# Patient Record
Sex: MALE | Race: WHITE | NOT HISPANIC OR LATINO | Employment: UNEMPLOYED | URBAN - METROPOLITAN AREA
[De-identification: names, ages, dates, MRNs, and addresses within clinical notes are randomized per-mention and may not be internally consistent; named-entity substitution may affect disease eponyms.]

---

## 2021-11-29 ENCOUNTER — HOSPITAL ENCOUNTER (EMERGENCY)
Facility: HOSPITAL | Age: 36
End: 2021-11-30
Attending: EMERGENCY MEDICINE | Admitting: STUDENT IN AN ORGANIZED HEALTH CARE EDUCATION/TRAINING PROGRAM
Payer: MEDICARE

## 2021-11-29 DIAGNOSIS — F22 DELUSIONS (HCC): Primary | ICD-10-CM

## 2021-11-29 DIAGNOSIS — R46.2 BIZARRE BEHAVIOR: ICD-10-CM

## 2021-11-29 DIAGNOSIS — Z00.8 MEDICAL CLEARANCE FOR PSYCHIATRIC ADMISSION: ICD-10-CM

## 2021-11-29 DIAGNOSIS — Z72.0 NICOTINE ABUSE: ICD-10-CM

## 2021-11-29 LAB
ALBUMIN SERPL BCP-MCNC: 3 G/DL (ref 3.5–5)
ALP SERPL-CCNC: 61 U/L (ref 46–116)
ALT SERPL W P-5'-P-CCNC: 56 U/L (ref 12–78)
ANION GAP SERPL CALCULATED.3IONS-SCNC: 9 MMOL/L (ref 4–13)
AST SERPL W P-5'-P-CCNC: 43 U/L (ref 5–45)
BASOPHILS # BLD AUTO: 0.06 THOUSANDS/ΜL (ref 0–0.1)
BASOPHILS NFR BLD AUTO: 1 % (ref 0–1)
BILIRUB SERPL-MCNC: 0.3 MG/DL (ref 0.2–1)
BUN SERPL-MCNC: 18 MG/DL (ref 5–25)
CALCIUM ALBUM COR SERPL-MCNC: 9.6 MG/DL (ref 8.3–10.1)
CALCIUM SERPL-MCNC: 8.8 MG/DL (ref 8.3–10.1)
CHLORIDE SERPL-SCNC: 108 MMOL/L (ref 100–108)
CO2 SERPL-SCNC: 28 MMOL/L (ref 21–32)
CREAT SERPL-MCNC: 0.99 MG/DL (ref 0.6–1.3)
EOSINOPHIL # BLD AUTO: 0.26 THOUSAND/ΜL (ref 0–0.61)
EOSINOPHIL NFR BLD AUTO: 4 % (ref 0–6)
ERYTHROCYTE [DISTWIDTH] IN BLOOD BY AUTOMATED COUNT: 12.7 % (ref 11.6–15.1)
GFR SERPL CREATININE-BSD FRML MDRD: 97 ML/MIN/1.73SQ M
GLUCOSE SERPL-MCNC: 129 MG/DL (ref 65–140)
HCT VFR BLD AUTO: 43.1 % (ref 36.5–49.3)
HGB BLD-MCNC: 14 G/DL (ref 12–17)
IMM GRANULOCYTES # BLD AUTO: 0.01 THOUSAND/UL (ref 0–0.2)
IMM GRANULOCYTES NFR BLD AUTO: 0 % (ref 0–2)
LYMPHOCYTES # BLD AUTO: 3.04 THOUSANDS/ΜL (ref 0.6–4.47)
LYMPHOCYTES NFR BLD AUTO: 41 % (ref 14–44)
MCH RBC QN AUTO: 29.9 PG (ref 26.8–34.3)
MCHC RBC AUTO-ENTMCNC: 32.5 G/DL (ref 31.4–37.4)
MCV RBC AUTO: 92 FL (ref 82–98)
MONOCYTES # BLD AUTO: 0.69 THOUSAND/ΜL (ref 0.17–1.22)
MONOCYTES NFR BLD AUTO: 9 % (ref 4–12)
NEUTROPHILS # BLD AUTO: 3.41 THOUSANDS/ΜL (ref 1.85–7.62)
NEUTS SEG NFR BLD AUTO: 45 % (ref 43–75)
NRBC BLD AUTO-RTO: 0 /100 WBCS
PLATELET # BLD AUTO: 316 THOUSANDS/UL (ref 149–390)
PMV BLD AUTO: 10.1 FL (ref 8.9–12.7)
POTASSIUM SERPL-SCNC: 3.8 MMOL/L (ref 3.5–5.3)
PROT SERPL-MCNC: 6.4 G/DL (ref 6.4–8.2)
RBC # BLD AUTO: 4.68 MILLION/UL (ref 3.88–5.62)
SODIUM SERPL-SCNC: 145 MMOL/L (ref 136–145)
WBC # BLD AUTO: 7.47 THOUSAND/UL (ref 4.31–10.16)

## 2021-11-29 PROCEDURE — 85025 COMPLETE CBC W/AUTO DIFF WBC: CPT | Performed by: EMERGENCY MEDICINE

## 2021-11-29 PROCEDURE — 99284 EMERGENCY DEPT VISIT MOD MDM: CPT | Performed by: EMERGENCY MEDICINE

## 2021-11-29 PROCEDURE — 0241U HB NFCT DS VIR RESP RNA 4 TRGT: CPT | Performed by: EMERGENCY MEDICINE

## 2021-11-29 PROCEDURE — 36415 COLL VENOUS BLD VENIPUNCTURE: CPT | Performed by: EMERGENCY MEDICINE

## 2021-11-29 PROCEDURE — 82075 ASSAY OF BREATH ETHANOL: CPT | Performed by: EMERGENCY MEDICINE

## 2021-11-29 PROCEDURE — 96372 THER/PROPH/DIAG INJ SC/IM: CPT

## 2021-11-29 PROCEDURE — 80053 COMPREHEN METABOLIC PANEL: CPT | Performed by: EMERGENCY MEDICINE

## 2021-11-29 PROCEDURE — 99285 EMERGENCY DEPT VISIT HI MDM: CPT

## 2021-11-29 RX ORDER — HALOPERIDOL 5 MG/ML
5 INJECTION INTRAMUSCULAR ONCE
Status: COMPLETED | OUTPATIENT
Start: 2021-11-29 | End: 2021-11-29

## 2021-11-29 RX ORDER — NICOTINE 21 MG/24HR
14 PATCH, TRANSDERMAL 24 HOURS TRANSDERMAL ONCE
Status: DISCONTINUED | OUTPATIENT
Start: 2021-11-29 | End: 2021-11-30 | Stop reason: HOSPADM

## 2021-11-29 RX ORDER — LORAZEPAM 2 MG/ML
2 INJECTION INTRAMUSCULAR ONCE
Status: COMPLETED | OUTPATIENT
Start: 2021-11-29 | End: 2021-11-29

## 2021-11-29 RX ORDER — BENZTROPINE MESYLATE 1 MG/ML
1 INJECTION INTRAMUSCULAR; INTRAVENOUS ONCE
Status: COMPLETED | OUTPATIENT
Start: 2021-11-29 | End: 2021-11-29

## 2021-11-29 RX ADMIN — NICOTINE 14 MG: 14 PATCH, EXTENDED RELEASE TRANSDERMAL at 23:16

## 2021-11-29 RX ADMIN — HALOPERIDOL LACTATE 5 MG: 5 INJECTION, SOLUTION INTRAMUSCULAR at 15:40

## 2021-11-29 RX ADMIN — BENZTROPINE MESYLATE 1 MG: 1 INJECTION INTRAMUSCULAR; INTRAVENOUS at 15:40

## 2021-11-29 RX ADMIN — LORAZEPAM 2 MG: 2 INJECTION INTRAMUSCULAR; INTRAVENOUS at 15:41

## 2021-11-30 ENCOUNTER — HOSPITAL ENCOUNTER (INPATIENT)
Facility: HOSPITAL | Age: 36
LOS: 15 days | Discharge: HOME | DRG: 885 | End: 2021-12-15
Attending: STUDENT IN AN ORGANIZED HEALTH CARE EDUCATION/TRAINING PROGRAM | Admitting: STUDENT IN AN ORGANIZED HEALTH CARE EDUCATION/TRAINING PROGRAM
Payer: MEDICARE

## 2021-11-30 VITALS
OXYGEN SATURATION: 100 % | SYSTOLIC BLOOD PRESSURE: 132 MMHG | WEIGHT: 170 LBS | RESPIRATION RATE: 16 BRPM | HEART RATE: 90 BPM | HEIGHT: 71 IN | TEMPERATURE: 97.7 F | BODY MASS INDEX: 23.8 KG/M2 | DIASTOLIC BLOOD PRESSURE: 70 MMHG

## 2021-11-30 DIAGNOSIS — Z00.8 MEDICAL CLEARANCE FOR PSYCHIATRIC ADMISSION: ICD-10-CM

## 2021-11-30 DIAGNOSIS — F31.10 BIPOLAR I DISORDER WITH MANIA (HCC): ICD-10-CM

## 2021-11-30 DIAGNOSIS — T14.8XXA AVULSION FRACTURE: Primary | ICD-10-CM

## 2021-11-30 PROBLEM — F31.9 BIPOLAR 1 DISORDER (HCC): Status: ACTIVE | Noted: 2021-11-30

## 2021-11-30 PROBLEM — Z72.0 TOBACCO ABUSE: Status: ACTIVE | Noted: 2021-11-30

## 2021-11-30 LAB
AMPHETAMINES SERPL QL SCN: NEGATIVE
BARBITURATES UR QL: NEGATIVE
BENZODIAZ UR QL: NEGATIVE
COCAINE UR QL: NEGATIVE
ETHANOL EXG-MCNC: 0 MG/DL
FLUAV RNA RESP QL NAA+PROBE: NEGATIVE
FLUBV RNA RESP QL NAA+PROBE: NEGATIVE
METHADONE UR QL: NEGATIVE
OPIATES UR QL SCN: NEGATIVE
OXYCODONE+OXYMORPHONE UR QL SCN: NEGATIVE
PCP UR QL: NEGATIVE
RSV RNA RESP QL NAA+PROBE: NEGATIVE
SARS-COV-2 RNA RESP QL NAA+PROBE: NEGATIVE
THC UR QL: POSITIVE

## 2021-11-30 PROCEDURE — 80307 DRUG TEST PRSMV CHEM ANLYZR: CPT | Performed by: EMERGENCY MEDICINE

## 2021-11-30 PROCEDURE — 99221 1ST HOSP IP/OBS SF/LOW 40: CPT | Performed by: INTERNAL MEDICINE

## 2021-11-30 RX ORDER — LANOLIN ALCOHOL/MO/W.PET/CERES
3 CREAM (GRAM) TOPICAL
Status: DISCONTINUED | OUTPATIENT
Start: 2021-11-30 | End: 2021-12-03

## 2021-11-30 RX ORDER — AMOXICILLIN 250 MG
1 CAPSULE ORAL DAILY PRN
Status: DISCONTINUED | OUTPATIENT
Start: 2021-11-30 | End: 2021-12-15 | Stop reason: HOSPADM

## 2021-11-30 RX ORDER — LORAZEPAM 2 MG/ML
2 INJECTION INTRAMUSCULAR EVERY 6 HOURS PRN
Status: DISCONTINUED | OUTPATIENT
Start: 2021-11-30 | End: 2021-12-15 | Stop reason: HOSPADM

## 2021-11-30 RX ORDER — OLANZAPINE 2.5 MG/1
5 TABLET ORAL
Status: CANCELLED | OUTPATIENT
Start: 2021-11-30

## 2021-11-30 RX ORDER — POLYETHYLENE GLYCOL 3350 17 G/17G
17 POWDER, FOR SOLUTION ORAL DAILY PRN
Status: DISCONTINUED | OUTPATIENT
Start: 2021-11-30 | End: 2021-12-15 | Stop reason: HOSPADM

## 2021-11-30 RX ORDER — MAGNESIUM HYDROXIDE/ALUMINUM HYDROXICE/SIMETHICONE 120; 1200; 1200 MG/30ML; MG/30ML; MG/30ML
30 SUSPENSION ORAL EVERY 4 HOURS PRN
Status: CANCELLED | OUTPATIENT
Start: 2021-11-30

## 2021-11-30 RX ORDER — ACETAMINOPHEN 325 MG/1
975 TABLET ORAL EVERY 6 HOURS PRN
Status: DISCONTINUED | OUTPATIENT
Start: 2021-11-30 | End: 2021-12-15 | Stop reason: HOSPADM

## 2021-11-30 RX ORDER — HYDROXYZINE HYDROCHLORIDE 25 MG/1
100 TABLET, FILM COATED ORAL
Status: CANCELLED | OUTPATIENT
Start: 2021-11-30

## 2021-11-30 RX ORDER — POLYETHYLENE GLYCOL 3350 17 G/17G
17 POWDER, FOR SOLUTION ORAL DAILY PRN
Status: CANCELLED | OUTPATIENT
Start: 2021-11-30

## 2021-11-30 RX ORDER — HYDROXYZINE 50 MG/1
50 TABLET, FILM COATED ORAL
Status: DISCONTINUED | OUTPATIENT
Start: 2021-11-30 | End: 2021-12-15 | Stop reason: HOSPADM

## 2021-11-30 RX ORDER — ACETAMINOPHEN 325 MG/1
650 TABLET ORAL EVERY 4 HOURS PRN
Status: DISCONTINUED | OUTPATIENT
Start: 2021-11-30 | End: 2021-12-15 | Stop reason: HOSPADM

## 2021-11-30 RX ORDER — ACETAMINOPHEN 325 MG/1
975 TABLET ORAL EVERY 6 HOURS PRN
Status: CANCELLED | OUTPATIENT
Start: 2021-11-30

## 2021-11-30 RX ORDER — POLYETHYLENE GLYCOL 3350 17 G/17G
17 POWDER, FOR SOLUTION ORAL DAILY PRN
Status: DISCONTINUED | OUTPATIENT
Start: 2021-11-30 | End: 2021-11-30

## 2021-11-30 RX ORDER — OLANZAPINE 2.5 MG/1
2.5 TABLET ORAL
Status: DISCONTINUED | OUTPATIENT
Start: 2021-11-30 | End: 2021-12-15 | Stop reason: HOSPADM

## 2021-11-30 RX ORDER — ACETAMINOPHEN 325 MG/1
650 TABLET ORAL EVERY 4 HOURS PRN
Status: CANCELLED | OUTPATIENT
Start: 2021-11-30

## 2021-11-30 RX ORDER — MAGNESIUM HYDROXIDE/ALUMINUM HYDROXICE/SIMETHICONE 120; 1200; 1200 MG/30ML; MG/30ML; MG/30ML
30 SUSPENSION ORAL EVERY 4 HOURS PRN
Status: DISCONTINUED | OUTPATIENT
Start: 2021-11-30 | End: 2021-12-15 | Stop reason: HOSPADM

## 2021-11-30 RX ORDER — HYDROXYZINE HYDROCHLORIDE 25 MG/1
50 TABLET, FILM COATED ORAL
Status: CANCELLED | OUTPATIENT
Start: 2021-11-30

## 2021-11-30 RX ORDER — HYDROXYZINE HYDROCHLORIDE 25 MG/1
25 TABLET, FILM COATED ORAL
Status: DISCONTINUED | OUTPATIENT
Start: 2021-11-30 | End: 2021-12-15 | Stop reason: HOSPADM

## 2021-11-30 RX ORDER — OLANZAPINE 10 MG/1
10 TABLET ORAL
Status: DISCONTINUED | OUTPATIENT
Start: 2021-11-30 | End: 2021-12-15 | Stop reason: HOSPADM

## 2021-11-30 RX ORDER — OLANZAPINE 5 MG/1
5 TABLET ORAL
Status: DISCONTINUED | OUTPATIENT
Start: 2021-11-30 | End: 2021-12-15 | Stop reason: HOSPADM

## 2021-11-30 RX ORDER — OLANZAPINE 2.5 MG/1
10 TABLET ORAL
Status: CANCELLED | OUTPATIENT
Start: 2021-11-30

## 2021-11-30 RX ORDER — OLANZAPINE 10 MG/1
10 INJECTION, POWDER, LYOPHILIZED, FOR SOLUTION INTRAMUSCULAR
Status: DISCONTINUED | OUTPATIENT
Start: 2021-11-30 | End: 2021-12-15 | Stop reason: HOSPADM

## 2021-11-30 RX ORDER — ACETAMINOPHEN 325 MG/1
650 TABLET ORAL EVERY 6 HOURS PRN
Status: DISCONTINUED | OUTPATIENT
Start: 2021-11-30 | End: 2021-12-15 | Stop reason: HOSPADM

## 2021-11-30 RX ORDER — OLANZAPINE 10 MG/1
5 INJECTION, POWDER, LYOPHILIZED, FOR SOLUTION INTRAMUSCULAR
Status: DISCONTINUED | OUTPATIENT
Start: 2021-11-30 | End: 2021-12-15 | Stop reason: HOSPADM

## 2021-11-30 RX ORDER — BENZTROPINE MESYLATE 1 MG/ML
1 INJECTION INTRAMUSCULAR; INTRAVENOUS
Status: CANCELLED | OUTPATIENT
Start: 2021-11-30

## 2021-11-30 RX ORDER — BENZTROPINE MESYLATE 0.5 MG/1
1 TABLET ORAL
Status: CANCELLED | OUTPATIENT
Start: 2021-11-30

## 2021-11-30 RX ORDER — HYDROXYZINE 50 MG/1
100 TABLET, FILM COATED ORAL
Status: DISCONTINUED | OUTPATIENT
Start: 2021-11-30 | End: 2021-12-15 | Stop reason: HOSPADM

## 2021-11-30 RX ORDER — OLANZAPINE 2.5 MG/1
2.5 TABLET ORAL
Status: CANCELLED | OUTPATIENT
Start: 2021-11-30

## 2021-11-30 RX ORDER — DIPHENHYDRAMINE HYDROCHLORIDE 50 MG/ML
50 INJECTION INTRAMUSCULAR; INTRAVENOUS EVERY 6 HOURS PRN
Status: DISCONTINUED | OUTPATIENT
Start: 2021-11-30 | End: 2021-12-15 | Stop reason: HOSPADM

## 2021-11-30 RX ORDER — BENZTROPINE MESYLATE 1 MG/1
1 TABLET ORAL
Status: DISCONTINUED | OUTPATIENT
Start: 2021-11-30 | End: 2021-12-15 | Stop reason: HOSPADM

## 2021-11-30 RX ORDER — DIPHENHYDRAMINE HYDROCHLORIDE 50 MG/ML
50 INJECTION INTRAMUSCULAR; INTRAVENOUS EVERY 6 HOURS PRN
Status: CANCELLED | OUTPATIENT
Start: 2021-11-30

## 2021-11-30 RX ORDER — LORAZEPAM 2 MG/ML
2 INJECTION INTRAMUSCULAR EVERY 6 HOURS PRN
Status: CANCELLED | OUTPATIENT
Start: 2021-11-30

## 2021-11-30 RX ORDER — OLANZAPINE 10 MG/1
10 INJECTION, POWDER, LYOPHILIZED, FOR SOLUTION INTRAMUSCULAR
Status: CANCELLED | OUTPATIENT
Start: 2021-11-30

## 2021-11-30 RX ORDER — MINERAL OIL AND PETROLATUM 150; 830 MG/G; MG/G
1 OINTMENT OPHTHALMIC
Status: DISCONTINUED | OUTPATIENT
Start: 2021-11-30 | End: 2021-12-15 | Stop reason: HOSPADM

## 2021-11-30 RX ORDER — HYDROXYZINE HYDROCHLORIDE 25 MG/1
25 TABLET, FILM COATED ORAL
Status: CANCELLED | OUTPATIENT
Start: 2021-11-30

## 2021-11-30 RX ORDER — OLANZAPINE 10 MG/1
5 INJECTION, POWDER, LYOPHILIZED, FOR SOLUTION INTRAMUSCULAR
Status: CANCELLED | OUTPATIENT
Start: 2021-11-30

## 2021-11-30 RX ORDER — LANOLIN ALCOHOL/MO/W.PET/CERES
3 CREAM (GRAM) TOPICAL
Status: CANCELLED | OUTPATIENT
Start: 2021-11-30

## 2021-11-30 RX ORDER — MINERAL OIL AND PETROLATUM 150; 830 MG/G; MG/G
1 OINTMENT OPHTHALMIC
Status: CANCELLED | OUTPATIENT
Start: 2021-11-30

## 2021-11-30 RX ORDER — BENZTROPINE MESYLATE 1 MG/ML
1 INJECTION INTRAMUSCULAR; INTRAVENOUS
Status: DISCONTINUED | OUTPATIENT
Start: 2021-11-30 | End: 2021-12-15 | Stop reason: HOSPADM

## 2021-11-30 RX ORDER — AMOXICILLIN 250 MG
1 CAPSULE ORAL DAILY PRN
Status: CANCELLED | OUTPATIENT
Start: 2021-11-30

## 2021-11-30 RX ORDER — ACETAMINOPHEN 325 MG/1
650 TABLET ORAL EVERY 6 HOURS PRN
Status: CANCELLED | OUTPATIENT
Start: 2021-11-30

## 2021-12-01 PROBLEM — F31.10 BIPOLAR I DISORDER WITH MANIA (HCC): Status: ACTIVE | Noted: 2021-11-30

## 2021-12-01 PROCEDURE — 99221 1ST HOSP IP/OBS SF/LOW 40: CPT | Performed by: STUDENT IN AN ORGANIZED HEALTH CARE EDUCATION/TRAINING PROGRAM

## 2021-12-01 RX ORDER — OLANZAPINE 10 MG/1
10 TABLET, ORALLY DISINTEGRATING ORAL
Status: DISCONTINUED | OUTPATIENT
Start: 2021-12-01 | End: 2021-12-02

## 2021-12-01 RX ORDER — DIVALPROEX SODIUM 500 MG/1
500 TABLET, EXTENDED RELEASE ORAL
Status: DISCONTINUED | OUTPATIENT
Start: 2021-12-01 | End: 2021-12-03

## 2021-12-01 RX ADMIN — NICOTINE POLACRILEX 2 MG: 2 GUM, CHEWING ORAL at 18:23

## 2021-12-01 RX ADMIN — NICOTINE POLACRILEX 2 MG: 2 GUM, CHEWING ORAL at 07:28

## 2021-12-01 RX ADMIN — NICOTINE POLACRILEX 2 MG: 2 GUM, CHEWING ORAL at 10:54

## 2021-12-01 RX ADMIN — NICOTINE POLACRILEX 2 MG: 2 GUM, CHEWING ORAL at 13:40

## 2021-12-02 PROCEDURE — 99232 SBSQ HOSP IP/OBS MODERATE 35: CPT | Performed by: STUDENT IN AN ORGANIZED HEALTH CARE EDUCATION/TRAINING PROGRAM

## 2021-12-02 RX ORDER — OLANZAPINE 10 MG/1
10 TABLET, ORALLY DISINTEGRATING ORAL
Status: DISCONTINUED | OUTPATIENT
Start: 2021-12-02 | End: 2021-12-06

## 2021-12-02 RX ORDER — OLANZAPINE 10 MG/1
10 INJECTION, POWDER, LYOPHILIZED, FOR SOLUTION INTRAMUSCULAR
Status: DISCONTINUED | OUTPATIENT
Start: 2021-12-02 | End: 2021-12-06

## 2021-12-02 RX ORDER — LORAZEPAM 0.5 MG/1
0.5 TABLET ORAL 2 TIMES DAILY
Status: DISCONTINUED | OUTPATIENT
Start: 2021-12-02 | End: 2021-12-10

## 2021-12-02 RX ADMIN — NICOTINE POLACRILEX 2 MG: 2 GUM, CHEWING ORAL at 04:05

## 2021-12-02 RX ADMIN — LORAZEPAM 0.5 MG: 0.5 TABLET ORAL at 23:48

## 2021-12-02 RX ADMIN — DIVALPROEX SODIUM 500 MG: 500 TABLET, EXTENDED RELEASE ORAL at 23:49

## 2021-12-02 RX ADMIN — NICOTINE POLACRILEX 2 MG: 2 GUM, CHEWING ORAL at 08:30

## 2021-12-02 RX ADMIN — ACETAMINOPHEN 650 MG: 325 TABLET ORAL at 16:06

## 2021-12-02 RX ADMIN — NICOTINE POLACRILEX 2 MG: 2 GUM, CHEWING ORAL at 14:33

## 2021-12-02 RX ADMIN — OLANZAPINE 10 MG: 10 INJECTION, POWDER, FOR SOLUTION INTRAMUSCULAR at 21:59

## 2021-12-02 RX ADMIN — MELATONIN TAB 3 MG 3 MG: 3 TAB at 23:49

## 2021-12-03 LAB
25(OH)D3 SERPL-MCNC: 36.5 NG/ML (ref 30–100)
VIT B12 SERPL-MCNC: 432 PG/ML (ref 100–900)

## 2021-12-03 PROCEDURE — 99231 SBSQ HOSP IP/OBS SF/LOW 25: CPT | Performed by: STUDENT IN AN ORGANIZED HEALTH CARE EDUCATION/TRAINING PROGRAM

## 2021-12-03 PROCEDURE — 82607 VITAMIN B-12: CPT | Performed by: STUDENT IN AN ORGANIZED HEALTH CARE EDUCATION/TRAINING PROGRAM

## 2021-12-03 PROCEDURE — 82306 VITAMIN D 25 HYDROXY: CPT | Performed by: STUDENT IN AN ORGANIZED HEALTH CARE EDUCATION/TRAINING PROGRAM

## 2021-12-03 RX ORDER — DIVALPROEX SODIUM 500 MG/1
1000 TABLET, EXTENDED RELEASE ORAL
Status: DISCONTINUED | OUTPATIENT
Start: 2021-12-03 | End: 2021-12-08

## 2021-12-03 RX ORDER — LANOLIN ALCOHOL/MO/W.PET/CERES
3 CREAM (GRAM) TOPICAL
Status: DISCONTINUED | OUTPATIENT
Start: 2021-12-03 | End: 2021-12-15 | Stop reason: HOSPADM

## 2021-12-03 RX ADMIN — LORAZEPAM 0.5 MG: 0.5 TABLET ORAL at 08:30

## 2021-12-03 RX ADMIN — LORAZEPAM 0.5 MG: 0.5 TABLET ORAL at 17:39

## 2021-12-03 RX ADMIN — OLANZAPINE 10 MG: 10 TABLET, ORALLY DISINTEGRATING ORAL at 21:22

## 2021-12-03 RX ADMIN — DIVALPROEX SODIUM 1000 MG: 500 TABLET, EXTENDED RELEASE ORAL at 21:21

## 2021-12-03 RX ADMIN — ACETAMINOPHEN 650 MG: 325 TABLET ORAL at 16:39

## 2021-12-03 RX ADMIN — ACETAMINOPHEN 650 MG: 325 TABLET ORAL at 07:15

## 2021-12-03 RX ADMIN — NICOTINE POLACRILEX 2 MG: 2 GUM, CHEWING ORAL at 14:20

## 2021-12-04 PROCEDURE — 99232 SBSQ HOSP IP/OBS MODERATE 35: CPT | Performed by: PSYCHIATRY & NEUROLOGY

## 2021-12-04 RX ADMIN — NICOTINE POLACRILEX 2 MG: 2 GUM, CHEWING ORAL at 02:43

## 2021-12-04 RX ADMIN — ACETAMINOPHEN 650 MG: 325 TABLET ORAL at 15:10

## 2021-12-04 RX ADMIN — LORAZEPAM 0.5 MG: 0.5 TABLET ORAL at 08:03

## 2021-12-04 RX ADMIN — NICOTINE POLACRILEX 2 MG: 2 GUM, CHEWING ORAL at 11:06

## 2021-12-04 RX ADMIN — DIVALPROEX SODIUM 1000 MG: 500 TABLET, EXTENDED RELEASE ORAL at 21:20

## 2021-12-04 RX ADMIN — OLANZAPINE 10 MG: 10 TABLET, ORALLY DISINTEGRATING ORAL at 21:21

## 2021-12-04 RX ADMIN — ACETAMINOPHEN 650 MG: 325 TABLET ORAL at 07:41

## 2021-12-04 RX ADMIN — NICOTINE POLACRILEX 2 MG: 2 GUM, CHEWING ORAL at 06:55

## 2021-12-04 RX ADMIN — LORAZEPAM 0.5 MG: 0.5 TABLET ORAL at 17:13

## 2021-12-05 PROCEDURE — 99232 SBSQ HOSP IP/OBS MODERATE 35: CPT | Performed by: PSYCHIATRY & NEUROLOGY

## 2021-12-05 RX ADMIN — DIVALPROEX SODIUM 1000 MG: 500 TABLET, EXTENDED RELEASE ORAL at 21:13

## 2021-12-05 RX ADMIN — NICOTINE POLACRILEX 2 MG: 2 GUM, CHEWING ORAL at 14:50

## 2021-12-05 RX ADMIN — LORAZEPAM 0.5 MG: 0.5 TABLET ORAL at 08:07

## 2021-12-05 RX ADMIN — ACETAMINOPHEN 650 MG: 325 TABLET ORAL at 11:59

## 2021-12-05 RX ADMIN — NICOTINE POLACRILEX 2 MG: 2 GUM, CHEWING ORAL at 06:37

## 2021-12-05 RX ADMIN — ACETAMINOPHEN 975 MG: 325 TABLET ORAL at 21:13

## 2021-12-05 RX ADMIN — NICOTINE POLACRILEX 2 MG: 2 GUM, CHEWING ORAL at 18:40

## 2021-12-05 RX ADMIN — LORAZEPAM 0.5 MG: 0.5 TABLET ORAL at 17:29

## 2021-12-05 RX ADMIN — OLANZAPINE 10 MG: 10 TABLET, ORALLY DISINTEGRATING ORAL at 21:13

## 2021-12-05 RX ADMIN — NICOTINE POLACRILEX 2 MG: 2 GUM, CHEWING ORAL at 11:59

## 2021-12-06 PROCEDURE — 99231 SBSQ HOSP IP/OBS SF/LOW 25: CPT | Performed by: STUDENT IN AN ORGANIZED HEALTH CARE EDUCATION/TRAINING PROGRAM

## 2021-12-06 RX ORDER — OLANZAPINE 10 MG/1
10 INJECTION, POWDER, LYOPHILIZED, FOR SOLUTION INTRAMUSCULAR
Status: DISCONTINUED | OUTPATIENT
Start: 2021-12-06 | End: 2021-12-15

## 2021-12-06 RX ADMIN — OLANZAPINE 15 MG: 5 TABLET, ORALLY DISINTEGRATING ORAL at 21:32

## 2021-12-06 RX ADMIN — NICOTINE POLACRILEX 2 MG: 2 GUM, CHEWING ORAL at 05:23

## 2021-12-06 RX ADMIN — ACETAMINOPHEN 975 MG: 325 TABLET ORAL at 05:21

## 2021-12-06 RX ADMIN — LORAZEPAM 0.5 MG: 0.5 TABLET ORAL at 08:04

## 2021-12-06 RX ADMIN — NICOTINE POLACRILEX 2 MG: 2 GUM, CHEWING ORAL at 08:41

## 2021-12-06 RX ADMIN — DIVALPROEX SODIUM 1000 MG: 500 TABLET, EXTENDED RELEASE ORAL at 21:32

## 2021-12-06 RX ADMIN — LORAZEPAM 0.5 MG: 0.5 TABLET ORAL at 17:44

## 2021-12-06 RX ADMIN — NICOTINE POLACRILEX 2 MG: 2 GUM, CHEWING ORAL at 17:49

## 2021-12-07 ENCOUNTER — APPOINTMENT (INPATIENT)
Dept: RADIOLOGY | Facility: HOSPITAL | Age: 36
DRG: 885 | End: 2021-12-07
Payer: MEDICARE

## 2021-12-07 PROBLEM — M79.601 RIGHT ARM PAIN: Status: ACTIVE | Noted: 2021-12-07

## 2021-12-07 LAB
ALBUMIN SERPL BCP-MCNC: 3.7 G/DL (ref 3–5.2)
ALP SERPL-CCNC: 68 U/L (ref 43–122)
ALT SERPL W P-5'-P-CCNC: 31 U/L
ANION GAP SERPL CALCULATED.3IONS-SCNC: 2 MMOL/L (ref 5–14)
AST SERPL W P-5'-P-CCNC: 28 U/L (ref 17–59)
BASOPHILS # BLD AUTO: 0.1 THOUSANDS/ΜL (ref 0–0.1)
BASOPHILS NFR BLD AUTO: 1 % (ref 0–1)
BILIRUB SERPL-MCNC: 0.13 MG/DL
BUN SERPL-MCNC: 20 MG/DL (ref 5–25)
CALCIUM SERPL-MCNC: 8.7 MG/DL (ref 8.4–10.2)
CHLORIDE SERPL-SCNC: 106 MMOL/L (ref 97–108)
CHOLEST SERPL-MCNC: 150 MG/DL
CO2 SERPL-SCNC: 30 MMOL/L (ref 22–30)
CREAT SERPL-MCNC: 1.09 MG/DL (ref 0.7–1.5)
EOSINOPHIL # BLD AUTO: 0.2 THOUSAND/ΜL (ref 0–0.4)
EOSINOPHIL NFR BLD AUTO: 3 % (ref 0–6)
ERYTHROCYTE [DISTWIDTH] IN BLOOD BY AUTOMATED COUNT: 12.8 %
GFR SERPL CREATININE-BSD FRML MDRD: 86 ML/MIN/1.73SQ M
GLUCOSE SERPL-MCNC: 121 MG/DL (ref 70–99)
HCT VFR BLD AUTO: 41.5 % (ref 41–53)
HDLC SERPL-MCNC: 33 MG/DL
HGB BLD-MCNC: 14.1 G/DL (ref 13.5–17.5)
LDLC SERPL CALC-MCNC: 74 MG/DL
LYMPHOCYTES # BLD AUTO: 2.9 THOUSANDS/ΜL (ref 0.5–4)
LYMPHOCYTES NFR BLD AUTO: 40 % (ref 25–45)
MAGNESIUM SERPL-MCNC: 1.8 MG/DL (ref 1.6–2.3)
MCH RBC QN AUTO: 30.5 PG (ref 26–34)
MCHC RBC AUTO-ENTMCNC: 33.9 G/DL (ref 31–36)
MCV RBC AUTO: 90 FL (ref 80–100)
MONOCYTES # BLD AUTO: 0.5 THOUSAND/ΜL (ref 0.2–0.9)
MONOCYTES NFR BLD AUTO: 7 % (ref 1–10)
NEUTROPHILS # BLD AUTO: 3.5 THOUSANDS/ΜL (ref 1.8–7.8)
NEUTS SEG NFR BLD AUTO: 49 % (ref 45–65)
NONHDLC SERPL-MCNC: 117 MG/DL
PHOSPHATE SERPL-MCNC: 4.8 MG/DL (ref 2.5–4.8)
PLATELET # BLD AUTO: 321 THOUSANDS/UL (ref 150–450)
PMV BLD AUTO: 8.7 FL (ref 8.9–12.7)
POTASSIUM SERPL-SCNC: 3.8 MMOL/L (ref 3.6–5)
PROT SERPL-MCNC: 6.6 G/DL (ref 5.9–8.4)
RBC # BLD AUTO: 4.61 MILLION/UL (ref 4.5–5.9)
SODIUM SERPL-SCNC: 138 MMOL/L (ref 137–147)
TRIGL SERPL-MCNC: 215 MG/DL
TSH SERPL DL<=0.05 MIU/L-ACNC: 0.96 UIU/ML (ref 0.47–4.68)
VALPROATE SERPL-MCNC: 31 UG/ML (ref 50–120)
WBC # BLD AUTO: 7.2 THOUSAND/UL (ref 4.5–11)

## 2021-12-07 PROCEDURE — 84100 ASSAY OF PHOSPHORUS: CPT | Performed by: STUDENT IN AN ORGANIZED HEALTH CARE EDUCATION/TRAINING PROGRAM

## 2021-12-07 PROCEDURE — 80053 COMPREHEN METABOLIC PANEL: CPT | Performed by: STUDENT IN AN ORGANIZED HEALTH CARE EDUCATION/TRAINING PROGRAM

## 2021-12-07 PROCEDURE — 84443 ASSAY THYROID STIM HORMONE: CPT | Performed by: STUDENT IN AN ORGANIZED HEALTH CARE EDUCATION/TRAINING PROGRAM

## 2021-12-07 PROCEDURE — 80061 LIPID PANEL: CPT | Performed by: STUDENT IN AN ORGANIZED HEALTH CARE EDUCATION/TRAINING PROGRAM

## 2021-12-07 PROCEDURE — 85025 COMPLETE CBC W/AUTO DIFF WBC: CPT | Performed by: STUDENT IN AN ORGANIZED HEALTH CARE EDUCATION/TRAINING PROGRAM

## 2021-12-07 PROCEDURE — 80164 ASSAY DIPROPYLACETIC ACD TOT: CPT | Performed by: STUDENT IN AN ORGANIZED HEALTH CARE EDUCATION/TRAINING PROGRAM

## 2021-12-07 PROCEDURE — 99231 SBSQ HOSP IP/OBS SF/LOW 25: CPT | Performed by: STUDENT IN AN ORGANIZED HEALTH CARE EDUCATION/TRAINING PROGRAM

## 2021-12-07 PROCEDURE — 83735 ASSAY OF MAGNESIUM: CPT | Performed by: STUDENT IN AN ORGANIZED HEALTH CARE EDUCATION/TRAINING PROGRAM

## 2021-12-07 PROCEDURE — 99231 SBSQ HOSP IP/OBS SF/LOW 25: CPT

## 2021-12-07 PROCEDURE — 86592 SYPHILIS TEST NON-TREP QUAL: CPT | Performed by: STUDENT IN AN ORGANIZED HEALTH CARE EDUCATION/TRAINING PROGRAM

## 2021-12-07 PROCEDURE — 83036 HEMOGLOBIN GLYCOSYLATED A1C: CPT | Performed by: STUDENT IN AN ORGANIZED HEALTH CARE EDUCATION/TRAINING PROGRAM

## 2021-12-07 PROCEDURE — 73080 X-RAY EXAM OF ELBOW: CPT

## 2021-12-07 RX ADMIN — NICOTINE POLACRILEX 2 MG: 2 GUM, CHEWING ORAL at 11:26

## 2021-12-07 RX ADMIN — DICLOFENAC SODIUM 2 G: 10 GEL TOPICAL at 21:14

## 2021-12-07 RX ADMIN — OLANZAPINE 15 MG: 5 TABLET, ORALLY DISINTEGRATING ORAL at 21:14

## 2021-12-07 RX ADMIN — NICOTINE POLACRILEX 2 MG: 2 GUM, CHEWING ORAL at 15:50

## 2021-12-07 RX ADMIN — NICOTINE POLACRILEX 2 MG: 2 GUM, CHEWING ORAL at 06:51

## 2021-12-07 RX ADMIN — NICOTINE POLACRILEX 2 MG: 2 GUM, CHEWING ORAL at 19:45

## 2021-12-07 RX ADMIN — DIVALPROEX SODIUM 1000 MG: 500 TABLET, EXTENDED RELEASE ORAL at 21:14

## 2021-12-07 RX ADMIN — DICLOFENAC SODIUM 2 G: 10 GEL TOPICAL at 14:39

## 2021-12-07 RX ADMIN — LORAZEPAM 0.5 MG: 0.5 TABLET ORAL at 08:42

## 2021-12-07 RX ADMIN — LORAZEPAM 0.5 MG: 0.5 TABLET ORAL at 17:37

## 2021-12-07 RX ADMIN — DICLOFENAC SODIUM 2 G: 10 GEL TOPICAL at 17:40

## 2021-12-08 LAB
EST. AVERAGE GLUCOSE BLD GHB EST-MCNC: 114 MG/DL
HBA1C MFR BLD: 5.6 %
RPR SER QL: NORMAL

## 2021-12-08 PROCEDURE — 99231 SBSQ HOSP IP/OBS SF/LOW 25: CPT | Performed by: STUDENT IN AN ORGANIZED HEALTH CARE EDUCATION/TRAINING PROGRAM

## 2021-12-08 RX ORDER — DIVALPROEX SODIUM 500 MG/1
1500 TABLET, EXTENDED RELEASE ORAL
Status: DISCONTINUED | OUTPATIENT
Start: 2021-12-08 | End: 2021-12-15 | Stop reason: HOSPADM

## 2021-12-08 RX ADMIN — DICLOFENAC SODIUM 2 G: 10 GEL TOPICAL at 21:08

## 2021-12-08 RX ADMIN — NICOTINE POLACRILEX 2 MG: 2 GUM, CHEWING ORAL at 11:15

## 2021-12-08 RX ADMIN — DICLOFENAC SODIUM 2 G: 10 GEL TOPICAL at 17:01

## 2021-12-08 RX ADMIN — LORAZEPAM 0.5 MG: 0.5 TABLET ORAL at 08:06

## 2021-12-08 RX ADMIN — OLANZAPINE 15 MG: 5 TABLET, ORALLY DISINTEGRATING ORAL at 21:08

## 2021-12-08 RX ADMIN — NICOTINE POLACRILEX 2 MG: 2 GUM, CHEWING ORAL at 05:23

## 2021-12-08 RX ADMIN — NICOTINE POLACRILEX 2 MG: 2 GUM, CHEWING ORAL at 16:57

## 2021-12-08 RX ADMIN — DICLOFENAC SODIUM 2 G: 10 GEL TOPICAL at 08:07

## 2021-12-08 RX ADMIN — NICOTINE POLACRILEX 2 MG: 2 GUM, CHEWING ORAL at 21:54

## 2021-12-08 RX ADMIN — DICLOFENAC SODIUM 2 G: 10 GEL TOPICAL at 11:17

## 2021-12-08 RX ADMIN — LORAZEPAM 0.5 MG: 0.5 TABLET ORAL at 17:01

## 2021-12-08 RX ADMIN — DIVALPROEX SODIUM 1500 MG: 500 TABLET, FILM COATED, EXTENDED RELEASE ORAL at 21:08

## 2021-12-09 PROCEDURE — 99231 SBSQ HOSP IP/OBS SF/LOW 25: CPT | Performed by: STUDENT IN AN ORGANIZED HEALTH CARE EDUCATION/TRAINING PROGRAM

## 2021-12-09 RX ADMIN — ACETAMINOPHEN 650 MG: 325 TABLET ORAL at 19:34

## 2021-12-09 RX ADMIN — DICLOFENAC SODIUM 2 G: 10 GEL TOPICAL at 21:19

## 2021-12-09 RX ADMIN — DICLOFENAC SODIUM 2 G: 10 GEL TOPICAL at 17:44

## 2021-12-09 RX ADMIN — LORAZEPAM 0.5 MG: 0.5 TABLET ORAL at 09:01

## 2021-12-09 RX ADMIN — NICOTINE POLACRILEX 2 MG: 2 GUM, CHEWING ORAL at 12:43

## 2021-12-09 RX ADMIN — OLANZAPINE 15 MG: 5 TABLET, ORALLY DISINTEGRATING ORAL at 21:19

## 2021-12-09 RX ADMIN — DICLOFENAC SODIUM 2 G: 10 GEL TOPICAL at 12:43

## 2021-12-09 RX ADMIN — DIVALPROEX SODIUM 1500 MG: 500 TABLET, FILM COATED, EXTENDED RELEASE ORAL at 21:19

## 2021-12-09 RX ADMIN — LORAZEPAM 0.5 MG: 0.5 TABLET ORAL at 17:46

## 2021-12-09 RX ADMIN — NICOTINE POLACRILEX 2 MG: 2 GUM, CHEWING ORAL at 19:21

## 2021-12-09 RX ADMIN — DICLOFENAC SODIUM 2 G: 10 GEL TOPICAL at 09:01

## 2021-12-10 PROCEDURE — 99231 SBSQ HOSP IP/OBS SF/LOW 25: CPT | Performed by: STUDENT IN AN ORGANIZED HEALTH CARE EDUCATION/TRAINING PROGRAM

## 2021-12-10 RX ORDER — LORAZEPAM 0.5 MG/1
0.5 TABLET ORAL DAILY
Status: DISCONTINUED | OUTPATIENT
Start: 2021-12-11 | End: 2021-12-14

## 2021-12-10 RX ADMIN — NICOTINE POLACRILEX 2 MG: 2 GUM, CHEWING ORAL at 11:39

## 2021-12-10 RX ADMIN — OLANZAPINE 15 MG: 5 TABLET, ORALLY DISINTEGRATING ORAL at 21:19

## 2021-12-10 RX ADMIN — NICOTINE POLACRILEX 2 MG: 2 GUM, CHEWING ORAL at 22:09

## 2021-12-10 RX ADMIN — ACETAMINOPHEN 975 MG: 325 TABLET ORAL at 11:55

## 2021-12-10 RX ADMIN — DIVALPROEX SODIUM 1500 MG: 500 TABLET, FILM COATED, EXTENDED RELEASE ORAL at 21:19

## 2021-12-10 RX ADMIN — NICOTINE POLACRILEX 2 MG: 2 GUM, CHEWING ORAL at 02:19

## 2021-12-10 RX ADMIN — LORAZEPAM 0.5 MG: 0.5 TABLET ORAL at 08:52

## 2021-12-11 PROBLEM — T14.8XXA AVULSION FRACTURE: Status: ACTIVE | Noted: 2021-12-07

## 2021-12-11 PROCEDURE — 99232 SBSQ HOSP IP/OBS MODERATE 35: CPT | Performed by: PSYCHIATRY & NEUROLOGY

## 2021-12-11 RX ADMIN — DIVALPROEX SODIUM 1500 MG: 500 TABLET, FILM COATED, EXTENDED RELEASE ORAL at 21:14

## 2021-12-11 RX ADMIN — LORAZEPAM 0.5 MG: 0.5 TABLET ORAL at 09:10

## 2021-12-11 RX ADMIN — ACETAMINOPHEN 975 MG: 325 TABLET ORAL at 16:52

## 2021-12-11 RX ADMIN — NICOTINE POLACRILEX 2 MG: 2 GUM, CHEWING ORAL at 21:27

## 2021-12-11 RX ADMIN — OLANZAPINE 15 MG: 5 TABLET, ORALLY DISINTEGRATING ORAL at 21:14

## 2021-12-11 RX ADMIN — DICLOFENAC SODIUM 2 G: 10 GEL TOPICAL at 21:27

## 2021-12-11 RX ADMIN — DICLOFENAC SODIUM 2 G: 10 GEL TOPICAL at 16:53

## 2021-12-12 PROCEDURE — 99232 SBSQ HOSP IP/OBS MODERATE 35: CPT | Performed by: PSYCHIATRY & NEUROLOGY

## 2021-12-12 RX ADMIN — NICOTINE POLACRILEX 2 MG: 2 GUM, CHEWING ORAL at 11:21

## 2021-12-12 RX ADMIN — DIVALPROEX SODIUM 1500 MG: 500 TABLET, FILM COATED, EXTENDED RELEASE ORAL at 21:21

## 2021-12-12 RX ADMIN — DICLOFENAC SODIUM 2 G: 10 GEL TOPICAL at 08:49

## 2021-12-12 RX ADMIN — NICOTINE POLACRILEX 2 MG: 2 GUM, CHEWING ORAL at 13:21

## 2021-12-12 RX ADMIN — OLANZAPINE 15 MG: 5 TABLET, ORALLY DISINTEGRATING ORAL at 21:22

## 2021-12-12 RX ADMIN — DICLOFENAC SODIUM 2 G: 10 GEL TOPICAL at 17:04

## 2021-12-12 RX ADMIN — NICOTINE POLACRILEX 2 MG: 2 GUM, CHEWING ORAL at 19:10

## 2021-12-12 RX ADMIN — DICLOFENAC SODIUM 2 G: 10 GEL TOPICAL at 21:40

## 2021-12-12 RX ADMIN — NICOTINE POLACRILEX 2 MG: 2 GUM, CHEWING ORAL at 21:39

## 2021-12-12 RX ADMIN — LORAZEPAM 0.5 MG: 0.5 TABLET ORAL at 08:46

## 2021-12-12 RX ADMIN — DICLOFENAC SODIUM 2 G: 10 GEL TOPICAL at 11:02

## 2021-12-13 PROCEDURE — 99231 SBSQ HOSP IP/OBS SF/LOW 25: CPT | Performed by: STUDENT IN AN ORGANIZED HEALTH CARE EDUCATION/TRAINING PROGRAM

## 2021-12-13 RX ADMIN — DICLOFENAC SODIUM 2 G: 10 GEL TOPICAL at 21:31

## 2021-12-13 RX ADMIN — NICOTINE POLACRILEX 2 MG: 2 GUM, CHEWING ORAL at 14:03

## 2021-12-13 RX ADMIN — LORAZEPAM 0.5 MG: 0.5 TABLET ORAL at 08:07

## 2021-12-13 RX ADMIN — DIVALPROEX SODIUM 1500 MG: 500 TABLET, FILM COATED, EXTENDED RELEASE ORAL at 21:32

## 2021-12-13 RX ADMIN — OLANZAPINE 15 MG: 5 TABLET, ORALLY DISINTEGRATING ORAL at 21:32

## 2021-12-13 RX ADMIN — DICLOFENAC SODIUM 2 G: 10 GEL TOPICAL at 17:33

## 2021-12-13 RX ADMIN — NICOTINE POLACRILEX 2 MG: 2 GUM, CHEWING ORAL at 19:01

## 2021-12-13 RX ADMIN — DICLOFENAC SODIUM 2 G: 10 GEL TOPICAL at 08:31

## 2021-12-14 PROBLEM — Z00.8 MEDICAL CLEARANCE FOR PSYCHIATRIC ADMISSION: Status: RESOLVED | Noted: 2021-11-30 | Resolved: 2021-12-14

## 2021-12-14 PROCEDURE — 99231 SBSQ HOSP IP/OBS SF/LOW 25: CPT | Performed by: STUDENT IN AN ORGANIZED HEALTH CARE EDUCATION/TRAINING PROGRAM

## 2021-12-14 PROCEDURE — 97161 PT EVAL LOW COMPLEX 20 MIN: CPT

## 2021-12-14 RX ORDER — DIVALPROEX SODIUM 500 MG/1
1500 TABLET, EXTENDED RELEASE ORAL
Qty: 42 TABLET | Refills: 1 | Status: SHIPPED | OUTPATIENT
Start: 2021-12-14 | End: 2021-12-28

## 2021-12-14 RX ORDER — OLANZAPINE 15 MG/1
15 TABLET, ORALLY DISINTEGRATING ORAL
Qty: 14 TABLET | Refills: 1 | Status: SHIPPED | OUTPATIENT
Start: 2021-12-14 | End: 2021-12-15 | Stop reason: HOSPADM

## 2021-12-14 RX ADMIN — LORAZEPAM 0.5 MG: 0.5 TABLET ORAL at 08:58

## 2021-12-14 RX ADMIN — DICLOFENAC SODIUM 2 G: 10 GEL TOPICAL at 08:58

## 2021-12-14 RX ADMIN — DIVALPROEX SODIUM 1500 MG: 500 TABLET, FILM COATED, EXTENDED RELEASE ORAL at 21:09

## 2021-12-14 RX ADMIN — OLANZAPINE 15 MG: 5 TABLET, ORALLY DISINTEGRATING ORAL at 21:09

## 2021-12-14 RX ADMIN — DICLOFENAC SODIUM 2 G: 10 GEL TOPICAL at 21:09

## 2021-12-14 RX ADMIN — NICOTINE POLACRILEX 2 MG: 2 GUM, CHEWING ORAL at 18:42

## 2021-12-14 RX ADMIN — NICOTINE POLACRILEX 2 MG: 2 GUM, CHEWING ORAL at 15:49

## 2021-12-14 RX ADMIN — DICLOFENAC SODIUM 2 G: 10 GEL TOPICAL at 18:02

## 2021-12-14 RX ADMIN — NICOTINE POLACRILEX 2 MG: 2 GUM, CHEWING ORAL at 08:59

## 2021-12-14 RX ADMIN — NICOTINE POLACRILEX 2 MG: 2 GUM, CHEWING ORAL at 11:39

## 2021-12-15 VITALS
WEIGHT: 182.6 LBS | OXYGEN SATURATION: 100 % | DIASTOLIC BLOOD PRESSURE: 57 MMHG | HEIGHT: 70 IN | SYSTOLIC BLOOD PRESSURE: 118 MMHG | RESPIRATION RATE: 16 BRPM | HEART RATE: 64 BPM | TEMPERATURE: 97.4 F | BODY MASS INDEX: 26.14 KG/M2

## 2021-12-15 PROCEDURE — 99238 HOSP IP/OBS DSCHRG MGMT 30/<: CPT | Performed by: STUDENT IN AN ORGANIZED HEALTH CARE EDUCATION/TRAINING PROGRAM

## 2021-12-15 RX ORDER — OLANZAPINE 15 MG/1
15 TABLET ORAL
Qty: 14 TABLET | Refills: 1 | Status: SHIPPED | OUTPATIENT
Start: 2021-12-15 | End: 2021-12-29

## 2021-12-15 RX ADMIN — DICLOFENAC SODIUM 2 G: 10 GEL TOPICAL at 08:35

## 2021-12-15 RX ADMIN — NICOTINE POLACRILEX 2 MG: 2 GUM, CHEWING ORAL at 11:34

## 2023-09-13 ENCOUNTER — HOSPITAL ENCOUNTER (EMERGENCY)
Facility: HOSPITAL | Age: 38
End: 2023-09-14
Attending: EMERGENCY MEDICINE

## 2023-09-13 DIAGNOSIS — Z00.8 MEDICAL CLEARANCE FOR PSYCHIATRIC ADMISSION: ICD-10-CM

## 2023-09-13 DIAGNOSIS — F29 PSYCHOSIS (HCC): Primary | ICD-10-CM

## 2023-09-13 LAB
AMPHETAMINES SERPL QL SCN: NEGATIVE
BARBITURATES UR QL: NEGATIVE
BENZODIAZ UR QL: NEGATIVE
COCAINE UR QL: NEGATIVE
ETHANOL EXG-MCNC: 0 MG/DL
OPIATES UR QL SCN: NEGATIVE
OXYCODONE+OXYMORPHONE UR QL SCN: NEGATIVE
PCP UR QL: NEGATIVE
THC UR QL: NEGATIVE

## 2023-09-13 PROCEDURE — 99285 EMERGENCY DEPT VISIT HI MDM: CPT | Performed by: EMERGENCY MEDICINE

## 2023-09-13 PROCEDURE — 80307 DRUG TEST PRSMV CHEM ANLYZR: CPT | Performed by: EMERGENCY MEDICINE

## 2023-09-13 PROCEDURE — 99283 EMERGENCY DEPT VISIT LOW MDM: CPT

## 2023-09-13 PROCEDURE — 82075 ASSAY OF BREATH ETHANOL: CPT | Performed by: EMERGENCY MEDICINE

## 2023-09-13 RX ORDER — TRAZODONE HYDROCHLORIDE 50 MG/1
50 TABLET ORAL
Status: ON HOLD | COMMUNITY

## 2023-09-13 RX ORDER — DIVALPROEX SODIUM 250 MG/1
250 TABLET, DELAYED RELEASE ORAL EVERY 12 HOURS SCHEDULED
Status: ON HOLD | COMMUNITY

## 2023-09-13 RX ORDER — ARIPIPRAZOLE 10 MG/1
10 TABLET ORAL DAILY
Status: ON HOLD | COMMUNITY

## 2023-09-13 RX ORDER — PHENOL 1.4 %
1 AEROSOL, SPRAY (ML) MUCOUS MEMBRANE
Status: ON HOLD | COMMUNITY

## 2023-09-13 RX ORDER — HYDROXYZINE PAMOATE 25 MG/1
25 CAPSULE ORAL 3 TIMES DAILY PRN
Status: ON HOLD | COMMUNITY

## 2023-09-13 NOTE — ED NOTES
302 statement made by Danuta Alex    We atttempted to [id number] him yesterday. He is in a state of psychosis. He was sitting outside and refused to come in. He said he was   God the Father. He kept saying statements of psychosis. He refuses to take his medicine. He will not meet with our psychiatrist.  At 1045am today, he walked out and headed towards the road and walked into the road. He had to be verbally redirected multiple times to leave the road and took some time. He does not appear to be in the right state of mind. Staff saw him hide a plastic butter knife in his notebook. Crisis Worker explained the 302 rights and patient stated he understood his rights.

## 2023-09-13 NOTE — ED NOTES
43 y.o male patient presented to the ED with the Police as a [phone]. Pt appeared to have Psychosis behaviors. Pt stated he is God the Father and he is sane and stable. 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 Pt stated he does not believe in taking any pharmaceuticals. Pt denies any SI, HI and A/V hallucinations. Pt 302 statement reported pt walked into the road and needed may redirections to come off the road. Pt was responding to internal stimuli and appeared to be having conversations with other people who were not in the room during the assessment. Pt had a previous inpatient at [facility] in 2021. Pt is currently in [facility]. Pt denies any legal issues.

## 2023-09-13 NOTE — ED PROVIDER NOTES
History  Chief Complaint   Patient presents with   • Psychiatric Evaluation     Presents to ED with police for psychosis under 302 warrant from [facility]. Staff reports that patient has not been taking medications since admission on 9/1 at facility. Staff reports that patient was standing in the road today. On arrival patient is incoherent and not answering questions. Patient is a 48-year-old male who is brought in by the police due to [facility] petitioning 302 for psychosis. Patient has been admitted at [facility] since 9/1/2023. [facility] reports that the patient is refusing to take his medications, today walked into a road with active traffic, is acting incoherent and responding to voices that he is hearing. Currently, the patient is responding to internal stimuli and talking to people other than myself and the crisis worker who are in the room. 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 He is difficult to redirect. He is tangential in his thoughts. He does not exhibit any insight into his condition or ability to care for himself. Prior to Admission Medications   Prescriptions Last Dose Informant Patient Reported? Taking?    ARIPiprazole (ABILIFY) 10 mg tablet  Outside Facility (Specify) Yes Yes   Sig: Take 10 mg by mouth daily   Diclofenac Sodium (VOLTAREN) 1 %   No No   Sig: Apply 2 g topically 4 (four) times a day   Melatonin 10 MG TABS  Outside Facility (Specify) Yes Yes   Sig: Take 1 mg by mouth daily at bedtime as needed   divalproex sodium (DEPAKOTE) 250 mg DR tablet  Outside Facility (Specify) Yes Yes   Sig: Take 250 mg by mouth every 12 (twelve) hours   hydrOXYzine pamoate (VISTARIL) 25 mg capsule  Outside Facility (Specify) Yes Yes   Sig: Take 25 mg by mouth 3 (three) times a day as needed for anxiety   traZODone (DESYREL) 50 mg tablet  Outside Facility (Specify) Yes Yes Sig: Take 50 mg by mouth daily at bedtime as needed for sleep      Facility-Administered Medications: None       Past Medical History:   Diagnosis Date   • Bipolar disorder ([address])        History reviewed. No pertinent surgical history. Family History   Family history unknown: Yes     I have reviewed and agree with the history as documented. E-Cigarette/Vaping   • E-Cigarette Use Never User      E-Cigarette/Vaping Substances   • Nicotine No    • THC No    • CBD No    • Flavoring No    • Other No    • Unknown No      Social History     Tobacco Use   • Smoking status: Every Day     Packs/day: 1.50     Types: Cigarettes   • Smokeless tobacco: Never   • Tobacco comments:     Pt unwilling    Vaping Use   • Vaping Use: Never used   Substance Use Topics   • Alcohol use: Never   • Drug use: Never       Review of Systems   Unable to perform ROS: Psychiatric disorder       Physical Exam  Physical Exam  Vitals and nursing note reviewed. Constitutional:       General: He is not in acute distress. Appearance: Normal appearance. He is not ill-appearing, toxic-appearing or diaphoretic. HENT:      Head: Normocephalic and atraumatic. Mouth/Throat:      Mouth: Mucous membranes are moist.   Eyes:      Conjunctiva/sclera: Conjunctivae normal.      Pupils: Pupils are equal, round, and reactive to light. Cardiovascular:      Rate and Rhythm: Normal rate and regular rhythm. Pulses: Normal pulses. Heart sounds: Normal heart sounds. No murmur heard. Pulmonary:      Effort: Pulmonary effort is normal. No respiratory distress. Breath sounds: Normal breath sounds. No stridor. No wheezing, rhonchi or rales. Chest:      Chest wall: No tenderness. Abdominal:      General: Bowel sounds are normal. There is no distension. Palpations: Abdomen is soft. Tenderness: There is no abdominal tenderness. There is no guarding or rebound. Skin:     General: Skin is warm and dry.    Neurological: General: No focal deficit present. Mental Status: He is alert. Mental status is at baseline. Psychiatric:         Attention and Perception: He is inattentive. He perceives auditory hallucinations. Mood and Affect: Affect is labile. Behavior: Behavior is hyperactive. Thought Content: Thought content is paranoid. Thought content does not include homicidal or suicidal ideation. Thought content does not include homicidal or suicidal plan. Judgment: Judgment is impulsive. Vital Signs  ED Triage Vitals [09/13/23 1611]   Temperature Pulse Respirations Blood Pressure SpO2   98.2 °F (36.8 °C) 78 18 135/74 98 %      Temp Source Heart Rate Source Patient Position - Orthostatic VS BP Location FiO2 (%)   Oral Monitor Sitting Right arm --      Pain Score       No Pain           Vitals:    09/13/23 1611   BP: 135/74   Pulse: 78   Patient Position - Orthostatic VS: Sitting         Visual Acuity      ED Medications  Medications - No data to display    Diagnostic Studies  Results Reviewed     Procedure Component Value Units Date/Time    Rapid drug screen, urine [[id number]]     Lab Status: No result Specimen: Urine     POCT alcohol breath test [[id number]]     Lab Status: No result                  No orders to display              Procedures  Procedures         ED Course  ED Course as of 09/13/23 1629   Wed Sep 13, 2023   1623 302 completed                                 SBIRT 20yo+    Flowsheet Row Most Recent Value   Initial Alcohol Screen: US AUDIT-C     1. How often do you have a drink containing alcohol? 0 Filed at: 09/13/2023 1614   2. How many drinks containing alcohol do you have on a typical day you are drinking? 0 Filed at: 09/13/2023 1614   3a. Male UNDER 65: How often do you have five or more drinks on one occasion? 0 Filed at: 09/13/2023 1614   3b. FEMALE Any Age, or MALE 65+: How often do you have 4 or more drinks on one occassion?  0 Filed at: 09/13/2023 1614   Audit-C Score 0 Filed at: 09/13/2023 1614   LUKAS: How many times in the past year have you. .. Used an illegal drug or used a prescription medication for non-medical reasons? Never Filed at: 09/13/2023 1614                    Medical Decision Making  Assessment and plan:  History of bipolar disease presenting with either manic episode or psychosis. Patient is not exhibiting insight into his condition and does not appear to have the ability to care for self based on my evaluation today and the report from [facility] stating that he walked into traffic earlier today. Patient is not exhibiting capacity to sign a voluntary commitment at this time. Will uphold the 302 petitioned by [employer] for patient safety. Amount and/or Complexity of Data Reviewed  Labs: ordered. Risk  Decision regarding hospitalization. Disposition  Final diagnoses:   Psychosis ([address])     Time reflects when diagnosis was documented in both MDM as applicable and the Disposition within this note     Time User Action Codes Description Comment    9/13/2023  4:26 PM Yumiko Isaac Add [F29] Psychosis [facility])       ED Disposition     ED Disposition   Transfer to [address] [place] [address]   --    Date/Time   Wed Sep 13, 2023  4:26 PM    Comment   Madi Zhang has been medically cleared and is pending crisis evaluation. Follow-up Information    None         Patient's Medications   Discharge Prescriptions    No medications on file       No discharge procedures on file.     PDMP Review       Value Time User    PDMP Reviewed  Yes 12/14/2021  4:45 PM Bruce Peters MD          ED Provider  Electronically Signed by           Yumiko Isaac DO  09/13/23 9031

## 2023-09-14 ENCOUNTER — HOSPITAL ENCOUNTER (INPATIENT)
Facility: HOSPITAL | Age: 38
LOS: 22 days | Discharge: OTHER INSTITUTION | DRG: 753 | End: 2023-10-06
Attending: PSYCHIATRY & NEUROLOGY | Admitting: PSYCHIATRY & NEUROLOGY
Payer: COMMERCIAL

## 2023-09-14 VITALS
BODY MASS INDEX: 25.94 KG/M2 | RESPIRATION RATE: 14 BRPM | SYSTOLIC BLOOD PRESSURE: 124 MMHG | TEMPERATURE: 98.7 F | OXYGEN SATURATION: 100 % | WEIGHT: 180.78 LBS | HEART RATE: 62 BPM | DIASTOLIC BLOOD PRESSURE: 80 MMHG

## 2023-09-14 DIAGNOSIS — F31.2 BIPOLAR I DISORDER, MOST RECENT EPISODE MANIC, SEVERE WITH PSYCHOTIC FEATURES (HCC): Primary | Chronic | ICD-10-CM

## 2023-09-14 DIAGNOSIS — Z00.8 MEDICAL CLEARANCE FOR PSYCHIATRIC ADMISSION: ICD-10-CM

## 2023-09-14 DIAGNOSIS — Z72.0 TOBACCO USE: ICD-10-CM

## 2023-09-14 DIAGNOSIS — F29 PSYCHOSIS (HCC): ICD-10-CM

## 2023-09-14 DIAGNOSIS — G47.00 INSOMNIA: ICD-10-CM

## 2023-09-14 PROCEDURE — 99245 OFF/OP CONSLTJ NEW/EST HI 55: CPT | Performed by: PSYCHIATRY & NEUROLOGY

## 2023-09-14 PROCEDURE — 96372 THER/PROPH/DIAG INJ SC/IM: CPT

## 2023-09-14 RX ORDER — ACETAMINOPHEN 325 MG/1
650 TABLET ORAL EVERY 6 HOURS PRN
Status: CANCELLED | OUTPATIENT
Start: 2023-09-14

## 2023-09-14 RX ORDER — LORAZEPAM 2 MG/ML
2 INJECTION INTRAMUSCULAR ONCE
Status: COMPLETED | OUTPATIENT
Start: 2023-09-14 | End: 2023-09-14

## 2023-09-14 RX ORDER — AMOXICILLIN 250 MG
1 CAPSULE ORAL DAILY PRN
Status: DISCONTINUED | OUTPATIENT
Start: 2023-09-14 | End: 2023-10-06 | Stop reason: HOSPADM

## 2023-09-14 RX ORDER — ACETAMINOPHEN 325 MG/1
650 TABLET ORAL EVERY 4 HOURS PRN
Status: DISCONTINUED | OUTPATIENT
Start: 2023-09-14 | End: 2023-10-06 | Stop reason: HOSPADM

## 2023-09-14 RX ORDER — ACETAMINOPHEN 325 MG/1
650 TABLET ORAL EVERY 6 HOURS PRN
Status: DISCONTINUED | OUTPATIENT
Start: 2023-09-14 | End: 2023-10-06 | Stop reason: HOSPADM

## 2023-09-14 RX ORDER — BENZTROPINE MESYLATE 1 MG/ML
1 INJECTION INTRAMUSCULAR; INTRAVENOUS 2 TIMES DAILY PRN
Status: CANCELLED | OUTPATIENT
Start: 2023-09-14

## 2023-09-14 RX ORDER — DIPHENHYDRAMINE HYDROCHLORIDE 50 MG/ML
50 INJECTION INTRAMUSCULAR; INTRAVENOUS EVERY 6 HOURS PRN
Status: DISCONTINUED | OUTPATIENT
Start: 2023-09-14 | End: 2023-10-06 | Stop reason: HOSPADM

## 2023-09-14 RX ORDER — PROPRANOLOL HYDROCHLORIDE 10 MG/1
10 TABLET ORAL EVERY 8 HOURS PRN
Status: DISCONTINUED | OUTPATIENT
Start: 2023-09-14 | End: 2023-10-06 | Stop reason: HOSPADM

## 2023-09-14 RX ORDER — LORAZEPAM 2 MG/ML
2 INJECTION INTRAMUSCULAR EVERY 6 HOURS PRN
Status: DISCONTINUED | OUTPATIENT
Start: 2023-09-14 | End: 2023-10-06 | Stop reason: HOSPADM

## 2023-09-14 RX ORDER — OLANZAPINE 10 MG/1
5 INJECTION, POWDER, LYOPHILIZED, FOR SOLUTION INTRAMUSCULAR
Status: CANCELLED | OUTPATIENT
Start: 2023-09-14

## 2023-09-14 RX ORDER — HYDROXYZINE 50 MG/1
100 TABLET, FILM COATED ORAL
Status: DISCONTINUED | OUTPATIENT
Start: 2023-09-14 | End: 2023-10-06 | Stop reason: HOSPADM

## 2023-09-14 RX ORDER — LANOLIN ALCOHOL/MO/W.PET/CERES
3 CREAM (GRAM) TOPICAL
Status: DISCONTINUED | OUTPATIENT
Start: 2023-09-14 | End: 2023-09-26

## 2023-09-14 RX ORDER — HALOPERIDOL 5 MG/ML
10 INJECTION INTRAMUSCULAR ONCE
Status: COMPLETED | OUTPATIENT
Start: 2023-09-14 | End: 2023-09-14

## 2023-09-14 RX ORDER — ACETAMINOPHEN 325 MG/1
975 TABLET ORAL EVERY 6 HOURS PRN
Status: CANCELLED | OUTPATIENT
Start: 2023-09-14

## 2023-09-14 RX ORDER — DIPHENHYDRAMINE HYDROCHLORIDE 50 MG/ML
50 INJECTION INTRAMUSCULAR; INTRAVENOUS ONCE
Status: DISCONTINUED | OUTPATIENT
Start: 2023-09-14 | End: 2023-09-14

## 2023-09-14 RX ORDER — NICOTINE 21 MG/24HR
1 PATCH, TRANSDERMAL 24 HOURS TRANSDERMAL DAILY
Status: CANCELLED | OUTPATIENT
Start: 2023-09-14

## 2023-09-14 RX ORDER — MAGNESIUM HYDROXIDE/ALUMINUM HYDROXICE/SIMETHICONE 120; 1200; 1200 MG/30ML; MG/30ML; MG/30ML
30 SUSPENSION ORAL EVERY 4 HOURS PRN
Status: DISCONTINUED | OUTPATIENT
Start: 2023-09-14 | End: 2023-10-06 | Stop reason: HOSPADM

## 2023-09-14 RX ORDER — OLANZAPINE 10 MG/1
10 INJECTION, POWDER, LYOPHILIZED, FOR SOLUTION INTRAMUSCULAR
Status: DISCONTINUED | OUTPATIENT
Start: 2023-09-14 | End: 2023-10-06 | Stop reason: HOSPADM

## 2023-09-14 RX ORDER — POLYETHYLENE GLYCOL 3350 17 G/17G
17 POWDER, FOR SOLUTION ORAL DAILY PRN
Status: DISCONTINUED | OUTPATIENT
Start: 2023-09-14 | End: 2023-10-06 | Stop reason: HOSPADM

## 2023-09-14 RX ORDER — DIPHENHYDRAMINE HYDROCHLORIDE 50 MG/ML
50 INJECTION INTRAMUSCULAR; INTRAVENOUS ONCE
Status: COMPLETED | OUTPATIENT
Start: 2023-09-14 | End: 2023-09-14

## 2023-09-14 RX ORDER — BENZTROPINE MESYLATE 1 MG/ML
1 INJECTION INTRAMUSCULAR; INTRAVENOUS 2 TIMES DAILY PRN
Status: DISCONTINUED | OUTPATIENT
Start: 2023-09-14 | End: 2023-10-06 | Stop reason: HOSPADM

## 2023-09-14 RX ORDER — HYDROXYZINE HYDROCHLORIDE 25 MG/1
25 TABLET, FILM COATED ORAL
Status: CANCELLED | OUTPATIENT
Start: 2023-09-14

## 2023-09-14 RX ORDER — HYDROXYZINE 50 MG/1
50 TABLET, FILM COATED ORAL
Status: DISCONTINUED | OUTPATIENT
Start: 2023-09-14 | End: 2023-10-06 | Stop reason: HOSPADM

## 2023-09-14 RX ORDER — OLANZAPINE 5 MG/1
5 TABLET ORAL
Status: CANCELLED | OUTPATIENT
Start: 2023-09-14

## 2023-09-14 RX ORDER — OLANZAPINE 5 MG/1
5 TABLET ORAL
Status: DISCONTINUED | OUTPATIENT
Start: 2023-09-14 | End: 2023-10-06 | Stop reason: HOSPADM

## 2023-09-14 RX ORDER — PROPRANOLOL HYDROCHLORIDE 20 MG/1
10 TABLET ORAL EVERY 8 HOURS PRN
Status: CANCELLED | OUTPATIENT
Start: 2023-09-14

## 2023-09-14 RX ORDER — OLANZAPINE 5 MG/1
5 TABLET, ORALLY DISINTEGRATING ORAL 2 TIMES DAILY
Status: DISCONTINUED | OUTPATIENT
Start: 2023-09-14 | End: 2023-09-16

## 2023-09-14 RX ORDER — OLANZAPINE 10 MG/1
5 INJECTION, POWDER, LYOPHILIZED, FOR SOLUTION INTRAMUSCULAR
Status: DISCONTINUED | OUTPATIENT
Start: 2023-09-14 | End: 2023-10-06 | Stop reason: HOSPADM

## 2023-09-14 RX ORDER — OLANZAPINE 10 MG/1
10 TABLET ORAL
Status: CANCELLED | OUTPATIENT
Start: 2023-09-14

## 2023-09-14 RX ORDER — HYDROXYZINE HYDROCHLORIDE 25 MG/1
25 TABLET, FILM COATED ORAL
Status: DISCONTINUED | OUTPATIENT
Start: 2023-09-14 | End: 2023-10-06 | Stop reason: HOSPADM

## 2023-09-14 RX ORDER — OLANZAPINE 5 MG/1
5 TABLET, ORALLY DISINTEGRATING ORAL 2 TIMES DAILY
Status: CANCELLED | OUTPATIENT
Start: 2023-09-14

## 2023-09-14 RX ORDER — MAGNESIUM HYDROXIDE/ALUMINUM HYDROXICE/SIMETHICONE 120; 1200; 1200 MG/30ML; MG/30ML; MG/30ML
30 SUSPENSION ORAL EVERY 4 HOURS PRN
Status: CANCELLED | OUTPATIENT
Start: 2023-09-14

## 2023-09-14 RX ORDER — POLYETHYLENE GLYCOL 3350 17 G/17G
17 POWDER, FOR SOLUTION ORAL DAILY PRN
Status: CANCELLED | OUTPATIENT
Start: 2023-09-14

## 2023-09-14 RX ORDER — OLANZAPINE 10 MG/1
10 TABLET ORAL
Status: DISCONTINUED | OUTPATIENT
Start: 2023-09-14 | End: 2023-10-06 | Stop reason: HOSPADM

## 2023-09-14 RX ORDER — HYDROXYZINE HYDROCHLORIDE 25 MG/1
100 TABLET, FILM COATED ORAL
Status: CANCELLED | OUTPATIENT
Start: 2023-09-14

## 2023-09-14 RX ORDER — ACETAMINOPHEN 325 MG/1
650 TABLET ORAL EVERY 4 HOURS PRN
Status: CANCELLED | OUTPATIENT
Start: 2023-09-14

## 2023-09-14 RX ORDER — OLANZAPINE 2.5 MG/1
2.5 TABLET ORAL
Status: CANCELLED | OUTPATIENT
Start: 2023-09-14

## 2023-09-14 RX ORDER — LANOLIN ALCOHOL/MO/W.PET/CERES
3 CREAM (GRAM) TOPICAL
Status: CANCELLED | OUTPATIENT
Start: 2023-09-14

## 2023-09-14 RX ORDER — BENZTROPINE MESYLATE 1 MG/1
1 TABLET ORAL 2 TIMES DAILY PRN
Status: DISCONTINUED | OUTPATIENT
Start: 2023-09-14 | End: 2023-10-06 | Stop reason: HOSPADM

## 2023-09-14 RX ORDER — BENZTROPINE MESYLATE 1 MG/1
1 TABLET ORAL 2 TIMES DAILY PRN
Status: CANCELLED | OUTPATIENT
Start: 2023-09-14

## 2023-09-14 RX ORDER — OLANZAPINE 2.5 MG/1
2.5 TABLET ORAL
Status: DISCONTINUED | OUTPATIENT
Start: 2023-09-14 | End: 2023-10-06 | Stop reason: HOSPADM

## 2023-09-14 RX ORDER — LORAZEPAM 2 MG/ML
2 INJECTION INTRAMUSCULAR EVERY 6 HOURS PRN
Status: CANCELLED | OUTPATIENT
Start: 2023-09-14

## 2023-09-14 RX ORDER — DIPHENHYDRAMINE HYDROCHLORIDE 50 MG/ML
50 INJECTION INTRAMUSCULAR; INTRAVENOUS EVERY 6 HOURS PRN
Status: CANCELLED | OUTPATIENT
Start: 2023-09-14

## 2023-09-14 RX ORDER — ACETAMINOPHEN 325 MG/1
975 TABLET ORAL EVERY 6 HOURS PRN
Status: DISCONTINUED | OUTPATIENT
Start: 2023-09-14 | End: 2023-10-06 | Stop reason: HOSPADM

## 2023-09-14 RX ORDER — HYDROXYZINE HYDROCHLORIDE 25 MG/1
50 TABLET, FILM COATED ORAL
Status: CANCELLED | OUTPATIENT
Start: 2023-09-14

## 2023-09-14 RX ORDER — NICOTINE 21 MG/24HR
1 PATCH, TRANSDERMAL 24 HOURS TRANSDERMAL DAILY
Status: DISCONTINUED | OUTPATIENT
Start: 2023-09-15 | End: 2023-09-21

## 2023-09-14 RX ORDER — OLANZAPINE 5 MG/1
5 TABLET, ORALLY DISINTEGRATING ORAL 2 TIMES DAILY
Status: DISCONTINUED | OUTPATIENT
Start: 2023-09-14 | End: 2023-09-14 | Stop reason: HOSPADM

## 2023-09-14 RX ORDER — AMOXICILLIN 250 MG
1 CAPSULE ORAL DAILY PRN
Status: CANCELLED | OUTPATIENT
Start: 2023-09-14

## 2023-09-14 RX ORDER — OLANZAPINE 10 MG/1
10 INJECTION, POWDER, LYOPHILIZED, FOR SOLUTION INTRAMUSCULAR
Status: CANCELLED | OUTPATIENT
Start: 2023-09-14

## 2023-09-14 RX ADMIN — DIPHENHYDRAMINE HYDROCHLORIDE 50 MG: 50 INJECTION, SOLUTION INTRAMUSCULAR; INTRAVENOUS at 09:25

## 2023-09-14 RX ADMIN — HALOPERIDOL LACTATE 10 MG: 5 INJECTION, SOLUTION INTRAMUSCULAR at 09:25

## 2023-09-14 RX ADMIN — LORAZEPAM 2 MG: 2 INJECTION INTRAMUSCULAR; INTRAVENOUS at 09:24

## 2023-09-14 NOTE — EMTALA/ACUTE CARE TRANSFER
[facility] EMERGENCY DEPARTMENT  [address] [place] [address]  Dept: [phone]      YGGYVX TRANSFER CONSENT    NAME Pickering May                                         1985                              MRN [id number]    I have been informed of my rights regarding examination, treatment, and transfer   by Dr. Kris Bradley DO    Benefits: Specialized equipment and/or services available at the receiving facility (Include comment)________________________    Risks: Potential for delay in receiving treatment      Consent for Transfer:  I acknowledge that my medical condition has been evaluated and explained to me by the emergency department physician or other qualified medical person and/or my attending physician, who has recommended that I be transferred to the service of  Accepting Physician: Dr Juárez at [address] [place] [address] [address] Name, [address] [facility] : [address] [place] [address]. The above potential benefits of such transfer, the potential risks associated with such transfer, and the probable risks of not being transferred have been explained to me, and I fully understand them. The doctor has explained that, in my case, the benefits of transfer outweigh the risks. I agree to be transferred. I authorize the performance of emergency medical procedures and treatments upon me in both transit and upon arrival at the receiving facility. Additionally, I authorize the release of any and all medical records to the receiving facility and request they be transported with me, if possible. I understand that the safest mode of transportation during a medical emergency is an ambulance and that the Hospital advocates the use of this mode of transport. Risks of traveling to the receiving facility by car, including absence of medical control, life sustaining equipment, such as oxygen, and medical personnel has been explained to me and I fully understand them.     ([address] Quality  BELOW)  [  ]  I consent to the stated transfer and to be transported by ambulance/helicopter. [  ]  I consent to the stated transfer, but refuse transportation by ambulance and accept full responsibility for my transportation by car. I understand the risks of non-ambulance transfers and I exonerate the Hospital and its staff from any deterioration in my condition that results from this refusal.    X___________________________________________    DATE  23  TIME________  Signature of patient or legally responsible individual signing on patient behalf           RELATIONSHIP TO PATIENT_________________________          Provider Certification    NAME Deedee Maria                                         1985                              MRN [id number]    A medical screening exam was performed on the above named patient. Based on the examination:    Condition Necessitating Transfer The encounter diagnosis was Psychosis ([address] Patient Condition: The patient has been stabilized such that within reasonable medical probability, no material deterioration of the patient condition or the condition of the unborn child(miah) is likely to result from the transfer    Reason for Transfer: Level of Care needed not available at this facility    Transfer Requirements: Facility [address] [place], [place]   · Space available and qualified personnel available for treatment as acknowledged by Delgado  · Agreed to accept transfer and to provide appropriate medical treatment as acknowledged by       Dr Ashlee Mauricio  · Appropriate medical records of the examination and treatment of the patient are provided at the time of transfer   [address] _______  · Transfer will be performed by qualified personnel from    and appropriate transfer equipment as required, including the use of necessary and appropriate life support measures.     Provider Certification: I have examined the patient and explained the following risks and benefits of being transferred/refusing transfer to the patient/family:  General risk, such as traffic hazards, adverse weather conditions, rough terrain or turbulence, possible failure of equipment (including vehicle or aircraft), or consequences of actions of persons outside the control of the transport personnel, The patient is stable for psychiatric transfer because they are medically stable, and is protected from harming him/herself or others during transport      Based on these reasonable risks and benefits to the patient and/or the unborn child(miah), and based upon the information available at the time of the patient’s examination, I certify that the medical benefits reasonably to be expected from the provision of appropriate medical treatments at another medical facility outweigh the increasing risks, if any, to the individual’s medical condition, and in the case of labor to the unborn child, from effecting the transfer.     X____________________________________________ DATE 09/14/23        TIME_______      ORIGINAL - SEND TO MEDICAL RECORDS   COPY - SEND WITH PATIENT DURING TRANSFER

## 2023-09-14 NOTE — ED NOTES
Spoke with crisis worker and provider regarding patient increasingly aggressive behavior towards crisis and staff. Patient not responding to de-escalation techniques. Speaking over staff, responding to internal stimuli. Pacing and incoherent speech.      Rony Harris RN  09/14/23 6980

## 2023-09-14 NOTE — ED NOTES
303 Hearing with [facility] will be held Friday 9/15/23 at 0930 via the [email or web address]. Meeting ID:  [id number].

## 2023-09-14 NOTE — ED NOTES
Patient sleeping, equal chest rise and fall. Repositioning self.       Parag Freedman, JUANITA  09/14/23 7139

## 2023-09-14 NOTE — ED NOTES
Patient is accepted at [facility] 3P. Patient is accepted by Joanna Pruett per Calderon Penny. Transportation is arranged with [employer]. Transportation is scheduled for 2100. Patient may go to the floor at anytime          Nurse report is to be called to [phone] prior to patient transfer.

## 2023-09-14 NOTE — ED NOTES
Under review at [facility] [place] Check this morning.

## 2023-09-14 NOTE — ED NOTES
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 Pt does not appear to understand 303 rights. [facility] called to schedule 303 hearing.   E-mail sent to behavioral health administration to schedule 303 eval.

## 2023-09-14 NOTE — CONSULTS
Consultation - Humberto Krishna 43 y.o. male MRN: [id number]  Unit/Bed#: SH 01 Encounter: [id number]     REQUIRED DOCUMENTATION:     1. This service was provided via Telemedicine. 2. Provider located at [address] [place] [address]. 3. TeleMed provider: Cosme Merrill DO  4. Identify all parties in room with patient during tele consult:   Goran Kent,  5. After connecting through televideo, patient was identified by name and date of birth. Parent/patient was then informed that this was being conducted confidentially over secure lines. My office door was closed. No one else was in the room. Patient acknowledged consent and understanding of privacy and security of the Telemedicine visit. I informed the patient that I have reviewed their record in Epic and presented the opportunity for them to ask any questions regarding the visit today. Chief Complaint: "You're fired"    History of Present Illness   Physician Requesting Consult: No att. providers found  Reason for Consult / Principal Problem: Dx 1. Psychosis ([facility])    Mariah Oppenheim is a 43 y.o. male with past medical history of Bipolar I disorder who presented to the ED by police due to [facility] petitioning 302 for psychosis. Per ED documentation patient had been admitted at [facility] since 9/1/2023 and [facility] had reported that the patient reportedly was refusing to take his medications, reportedly walked into a road with active traffic, was reportedly acting incoherent and responding to voices that he is hearing. 302 was upheld by emergency room physician. Patient is a poor historian. He was uncooperative and refused to answer questions directly. The crisis worker held the device. Patient was exhibiting bizarre behavior. He repeatedly told the crisis worker "you are fired. You will not obey orders. Enjoy your nursing home."  Patient was agitated, tangential, and exhibited flight of ideas.   He made statements such as "I'm the only doctor that exists" and would repeatedly state "goodbye". He would "shush" repeatedly. He made bizarre noises and would change his voice sometimes to a deep ominous voice. He repeatedly stated "you are not obeying my orders". His speech was pressured. He repeatedly stated that he is "God the father" and was demanding apologies. He stated that he did not have to answer questions because "I made you". He exhibited clanging stating "liar, pants on fire, you are fired". At the end of the interview patient stated "what is the one question you want to ask me God the father?" and still would not answer any questions. He began to become agitated and angry and the interview was terminated. Psychiatric Review Of Systems:  Unable to assess due to lack of cooperation    Historical Information   Past Psychiatric History:   Per chart patient had been admitted to [address] [place] [address] in 11/30/2021 until 12/15/2021  Currently in treatment with -unable to assess due to lack of cooperation. Past Suicide attempts: Unable to assess due to lack of cooperation  Past Violent behavior: Unable to assess due to lack of cooperation, per chart yes aggression towards police  Past Psychiatric medication trial: Sertraline, bupropion, mixed amphetamine salts, olanzapine, divalproex, lorazepam, Abilify, trazodone, hydroxyzine    Substance Abuse History:  Unable to assess due to lack of cooperation    I am unable to assess the patient for substance use within the past 12 months as they are unable or unwilling to answer    History of IP/OP rehabilitation program: Unable to assess due to lack of cooperation. Smoking history: Unable to assess due to lack of cooperation.   Per chart patient is a smoking history of 1 pack/day    Family Psychiatric History:   Unable to assess due to lack of cooperation    Social History  Education: Unable to assess due to lack of cooperation  Learning Disabilities: Unable to assess due to lack of cooperation. Per chart ADHD history  Marital history: single  Living arrangement, social support: Unable to assess due to lack of cooperation  Occupational History: Unable to assess due to lack of cooperation  Functioning Relationships: Unable to assess due to lack of cooperation. Other Pertinent History: Unable to assess due to lack of cooperation. Per chart past arrest due to theft    Traumatic History:   Abuse: Unable to assess due to lack of cooperation  Other Traumatic Events: Unable to assess due to lack of cooperation    Past Medical History:   Diagnosis Date   • Bipolar disorder [facility])        Medical Review Of Systems:  Review of Systems - Unable to assess due to lack of cooperation    Meds/Allergies   all current active meds have been reviewed and current meds:   No current facility-administered medications for this encounter.      Allergies   Allergen Reactions   • Erythromycin Rash   • Sulfa Antibiotics Rash       Objective   Vital signs in last 24 hours:  Temp:  [98.2 °F (36.8 °C)] 98.2 °F (36.8 °C)  HR:  [78-91] 91  Resp:  [18] 18  BP: (135-145)/(74-90) 145/90    No intake or output data in the 24 hours ending 09/14/23 0919    Mental Status Evaluation:  Appearance:  appears stated age and in paper scrubs   Behavior:  poor eye contact, psychomotor agitation and guarded   Speech:  loud, pressured and changing tone of voice, at times deep and ominous, clanging   Mood:  Did not stated   Affect:  Increased in intensity, angry at times   Language: Unable to assess due to lack of cooperation   Thought Process:  tangential and flight of ideas   Associations: tangential associations   Thought Content:  Delusions of grandeur   Perceptual Disturbances: Unable to assess due to lack of cooperation   Risk Potential: Unable to assess due to lack of cooperation   Sensorium:  person and place   Memory:  recent and remote memory: unable to assess due to lack of cooperation   Cognition:  recent and remote memory: unable to assess due to lack of cooperation   Consciousness:  alert and awake    Attention: attention span appeared shorter than expected for age   Intellect: Unable to assess due to lack of cooperation   Fund of Knowledge: Unable to assess due to lack of cooperation   Insight:  poor   Judgment: poor   Muscle Strength and Tone:  Unable to assess due to virtual visit   Gait/Station:  Unable to assess due to virtual visit   Motor Activity: no abnormal movements visualized     Lab Results:    I have personally reviewed all pertinent laboratory/tests results. Labs in last 72 hours: No results for input(s): "WBC", "RBC", "HGB", "HCT", "PLT", "RDW", "TOTANEUTABS", "NEUTROABS", "SODIUM", "K", "CL", "CO2", "BUN", "CREATININE", "GLUC", "GLUF", "CALCIUM", "AST", "ALT", "ALKPHOS", "TP", "ALB", "TBILI", "CHOLESTEROL", "HDL", "TRIG", "LDLCALC", "VALPROICTOT", "CARBAMAZEPIN", "LITHIUM", "AMMONIA", "[id number]", "Golden Romp", "[facility]", "PREGTESTUR", "PREGSERUM", "HCG", "HCGQUANT", "RPR" in the last 72 hours. Drug Screen:   Lab Results   Component Value Date    AMPMETHUR Negative 09/13/2023    BARBTUR Negative 09/13/2023    BDZUR Negative 09/13/2023    THCUR Negative 09/13/2023    COCAINEUR Negative 09/13/2023    METHADONEUR Negative 11/30/2021    OPIATEUR Negative 09/13/2023    PCPUR Negative 09/13/2023     Ext Breath Alcohol   Lab Results   Component Value Date    BREATHALC 0.000 09/13/2023     EKG No results found for: "VENTRATE", "ATRIALRATE", "PRINT", "QRSDINT", "QTINT", "QTCINT", "PAXIS", "Suanne Leoncio", "TWAVEAXIS"    Code Status: )Prior    Assessment/Plan     Assessment:  Umu Arrieta is a 43 y.o. male with past medical history of Bipolar I disorder who presented to the ED by police due to [facility] petitioning 302 for psychosis.   Per ED documentation patient had been admitted at [facility] since 9/1/2023 and [facility] had reported that the patient reportedly was refusing to take his medications, reportedly walked into a road with active traffic, was reportedly acting incoherent and responding to voices that he is hearing. 302 was upheld by emergency room physician. Patient is a poor historian. He was uncooperative and refused to answer questions directly. His behavior is bizarre. He was tangential and exhibited flight of ideas. His speech was pressured and at times changed to a deep ominous voice. He made bizarre sounds. He exhibited delusions of grandeur stating he is "God and the father", demanded apologies, and  repeatedly stated "you are fired", and that "I'm the only doctor that exists". At the end of the interview patient stated "what is the one question you want to ask me God the father?" and still would not answer any questions. He began to become agitated and angry and the interview was terminated. Patient is acutely manic and psychotic. He exhibits poor insight and poor judgment and is a danger to himself and others. Patient requires inpatient psychiatric hospitalization. 303 completed and sent to crisis worker. Diagnosis:  Bipolar 1 disorder, manic, severe, with psychotic features    Plan:   • Continue medical management  • Continue one-to-one observation  • Patient requires inpatient psychiatric hospitalization  • 303 completed and sent to crisis worker  • Recommend initiating Zyprexa Zydis ODT 5 mg BID for psychosis and mood stabilization  • Limit use of antipsychotics to Zyprexa to avoid increased risk of adverse effects with using multiple antipsychotics. Offer patient oral medications for agitation prior to parenteral use for severe agitation. Recommend avoiding using lorazepam/benzodiazepines if possible within 1 hour of administration of Zyprexa as this has been associated with excessive sedation and cardiorespiratory depression.   May use Benadryl in lieu of benzodiazepines  • Discussed with the ED physician  • Please contact psychiatry consultation role in [phone] with questions and for follow-up  • For overnights and weekends please contact [address] [place] [address] for psychiatric purposes    Risks, benefits and possible side effects of Medications:   Unable to have discussion due to lack of cooperation         Mini Steen DO  09/14/23

## 2023-09-14 NOTE — ED NOTES
Pt during eval posturing towards crisis, refusing to directly answer questions. Pt spoke over CC and provider during the eval.  Pt making direct threats towards CC and targeting him. CC attempting to de-escalate but could not speak over yelling pt. Eval ended due to pt becoming increasingly agitated and and making more direct threats towards CC. IM medications administered by nursing staff due to psychosis, declining to take meds as prescribed, and creating an unsafe environment.

## 2023-09-14 NOTE — ED NOTES
Bed search:    [address] faxed to [facility] for review  [facility] answer  [facility] faxed to [facility] for review  [facility]no answer  [facility]-no beds  [facility] and face sheet faxed to Intake

## 2023-09-15 PROBLEM — F31.10 BIPOLAR I DISORDER WITH MANIA (HCC): Chronic | Status: ACTIVE | Noted: 2021-11-30

## 2023-09-15 PROBLEM — F31.2 BIPOLAR I DISORDER, MOST RECENT EPISODE MANIC, SEVERE WITH PSYCHOTIC FEATURES (HCC): Chronic | Status: ACTIVE | Noted: 2021-11-30

## 2023-09-15 LAB
25(OH)D3 SERPL-MCNC: 34.8 NG/ML (ref 30–100)
ALBUMIN SERPL BCP-MCNC: 4.1 G/DL (ref 3.5–5)
ALP SERPL-CCNC: 43 U/L (ref 34–104)
ALT SERPL W P-5'-P-CCNC: 13 U/L (ref 7–52)
AMORPH PHOS CRY URNS QL MICRO: ABNORMAL /HPF
ANION GAP SERPL CALCULATED.3IONS-SCNC: 7 MMOL/L
AST SERPL W P-5'-P-CCNC: 26 U/L (ref 13–39)
BACTERIA UR QL AUTO: ABNORMAL /HPF
BASOPHILS # BLD AUTO: 0.05 THOUSANDS/ÂΜL (ref 0–0.1)
BASOPHILS NFR BLD AUTO: 1 % (ref 0–1)
BILIRUB SERPL-MCNC: 0.49 MG/DL (ref 0.2–1)
BILIRUB UR QL STRIP: NEGATIVE
BUN SERPL-MCNC: 13 MG/DL (ref 5–25)
CALCIUM SERPL-MCNC: 9.2 MG/DL (ref 8.4–10.2)
CHLORIDE SERPL-SCNC: 106 MMOL/L (ref 96–108)
CHOLEST SERPL-MCNC: 135 MG/DL
CLARITY UR: ABNORMAL
CO2 SERPL-SCNC: 27 MMOL/L (ref 21–32)
COLOR UR: YELLOW
CREAT SERPL-MCNC: 0.75 MG/DL (ref 0.6–1.3)
EOSINOPHIL # BLD AUTO: 0.21 THOUSAND/ÂΜL (ref 0–0.61)
EOSINOPHIL NFR BLD AUTO: 4 % (ref 0–6)
ERYTHROCYTE [DISTWIDTH] IN BLOOD BY AUTOMATED COUNT: 12.5 % (ref 11.6–15.1)
EST. AVERAGE GLUCOSE BLD GHB EST-MCNC: 126 MG/DL
FOLATE SERPL-MCNC: 17.5 NG/ML
GFR SERPL CREATININE-BSD FRML MDRD: 115 ML/MIN/1.73SQ M
GLUCOSE P FAST SERPL-MCNC: 84 MG/DL (ref 65–99)
GLUCOSE SERPL-MCNC: 84 MG/DL (ref 65–140)
GLUCOSE UR STRIP-MCNC: NEGATIVE MG/DL
HBA1C MFR BLD: 6 %
HCT VFR BLD AUTO: 45.6 % (ref 36.5–49.3)
HDLC SERPL-MCNC: 38 MG/DL
HGB BLD-MCNC: 15.3 G/DL (ref 12–17)
HGB UR QL STRIP.AUTO: NEGATIVE
IMM GRANULOCYTES # BLD AUTO: 0.01 THOUSAND/UL (ref 0–0.2)
IMM GRANULOCYTES NFR BLD AUTO: 0 % (ref 0–2)
KETONES UR STRIP-MCNC: NEGATIVE MG/DL
LDLC SERPL CALC-MCNC: 72 MG/DL (ref 0–100)
LEUKOCYTE ESTERASE UR QL STRIP: NEGATIVE
LYMPHOCYTES # BLD AUTO: 2.12 THOUSANDS/ÂΜL (ref 0.6–4.47)
LYMPHOCYTES NFR BLD AUTO: 40 % (ref 14–44)
MCH RBC QN AUTO: 29.3 PG (ref 26.8–34.3)
MCHC RBC AUTO-ENTMCNC: 33.6 G/DL (ref 31.4–37.4)
MCV RBC AUTO: 87 FL (ref 82–98)
MONOCYTES # BLD AUTO: 0.4 THOUSAND/ÂΜL (ref 0.17–1.22)
MONOCYTES NFR BLD AUTO: 8 % (ref 4–12)
MUCOUS THREADS UR QL AUTO: ABNORMAL
NEUTROPHILS # BLD AUTO: 2.56 THOUSANDS/ÂΜL (ref 1.85–7.62)
NEUTS SEG NFR BLD AUTO: 47 % (ref 43–75)
NITRITE UR QL STRIP: NEGATIVE
NON-SQ EPI CELLS URNS QL MICRO: ABNORMAL /HPF
NONHDLC SERPL-MCNC: 97 MG/DL
NRBC BLD AUTO-RTO: 0 /100 WBCS
PH UR STRIP.AUTO: 6.5 [PH]
PLATELET # BLD AUTO: 292 THOUSANDS/UL (ref 149–390)
PMV BLD AUTO: 10.6 FL (ref 8.9–12.7)
POTASSIUM SERPL-SCNC: 4.2 MMOL/L (ref 3.5–5.3)
PROT SERPL-MCNC: 6.6 G/DL (ref 6.4–8.4)
PROT UR STRIP-MCNC: NEGATIVE MG/DL
RBC # BLD AUTO: 5.23 MILLION/UL (ref 3.88–5.62)
RBC #/AREA URNS AUTO: ABNORMAL /HPF
SODIUM SERPL-SCNC: 140 MMOL/L (ref 135–147)
SP GR UR STRIP.AUTO: 1.01 (ref 1–1.04)
T4 FREE SERPL-MCNC: 1.05 NG/DL (ref 0.61–1.12)
TREPONEMA PALLIDUM IGG+IGM AB [PRESENCE] IN SERUM OR PLASMA BY IMMUNOASSAY: NORMAL
TRIGL SERPL-MCNC: 125 MG/DL
TSH SERPL DL<=0.05 MIU/L-ACNC: 0.41 UIU/ML (ref 0.45–4.5)
UROBILINOGEN UA: 1 MG/DL
VIT B12 SERPL-MCNC: 358 PG/ML (ref 180–914)
WBC # BLD AUTO: 5.35 THOUSAND/UL (ref 4.31–10.16)
WBC #/AREA URNS AUTO: ABNORMAL /HPF

## 2023-09-15 PROCEDURE — 99253 IP/OBS CNSLTJ NEW/EST LOW 45: CPT | Performed by: NURSE PRACTITIONER

## 2023-09-15 PROCEDURE — 80053 COMPREHEN METABOLIC PANEL: CPT

## 2023-09-15 PROCEDURE — 99223 1ST HOSP IP/OBS HIGH 75: CPT | Performed by: PSYCHIATRY & NEUROLOGY

## 2023-09-15 PROCEDURE — 84439 ASSAY OF FREE THYROXINE: CPT

## 2023-09-15 PROCEDURE — 86780 TREPONEMA PALLIDUM: CPT

## 2023-09-15 PROCEDURE — 82306 VITAMIN D 25 HYDROXY: CPT

## 2023-09-15 PROCEDURE — 81001 URINALYSIS AUTO W/SCOPE: CPT

## 2023-09-15 PROCEDURE — 85025 COMPLETE CBC W/AUTO DIFF WBC: CPT

## 2023-09-15 PROCEDURE — 83036 HEMOGLOBIN GLYCOSYLATED A1C: CPT | Performed by: PSYCHIATRY & NEUROLOGY

## 2023-09-15 PROCEDURE — 82607 VITAMIN B-12: CPT

## 2023-09-15 PROCEDURE — 80061 LIPID PANEL: CPT

## 2023-09-15 PROCEDURE — 84443 ASSAY THYROID STIM HORMONE: CPT

## 2023-09-15 PROCEDURE — 82746 ASSAY OF FOLIC ACID SERUM: CPT

## 2023-09-15 NOTE — TREATMENT PLAN
TREATMENT PLAN REVIEW - [address] Lauren Krishna 43 y.o. 1985 male MRN: [id number]    [address] Room / Bed: [address] 819-29 Encounter: [id number]        Admit Date/Time:  9/14/2023  9:39 PM    Treatment Team: Attending Provider: Yaneth Evans MD; Patient Care Technician: Shay Vides; Care Manager: Ira Valdez [facility];  Registered Nurse: Rachelle Rogers RN; Patient Care Assistant: Anish Smith; Recreational Therapist: Angi Hernandez; : Eva Castano; Registered Nurse: Fatemeh Castro RN    Diagnosis: Principal Problem:    Bipolar I disorder, most recent episode manic, severe with psychotic features [facility]  Active Problems:    Medical clearance for psychiatric admission    Tobacco abuse      Patient Strengths/Assets: negotiates basic needs      Patient Barriers/Limitations: chronic mental illness, noncompliant with medication, noncompliant with treatment, patient is on an involuntary commitment, poor insight, poor reasoning ability    Short Term Goals: decrease in manic symptoms, decrease in psychotic symptoms, improvement in insight, improvement in reality testing, improvement in reasoning ability, improvement in impulse control, tolerate medications, mood stabilization    Long Term Goals: resolution of manic symptoms, stabilization of mood, resolution of psychotic symptoms, improved reality testing, improved reasoning ability, improved impulse control, improved insight, no agitation on the unit, acceptance of need for psychiatric medications, acceptance of need for psychiatric treatment, acceptance of need for psychiatric follow up after discharge    Progress Towards Goals: restarting psychiatric medications as prescribed    Recommended Treatment: medication management, patient medication education, group therapy, milieu therapy, continued Behavioral Health psychiatric evaluation/assessment process     Treatment Frequency: daily medication monitoring, group and milieu therapy daily, monitoring through interdisciplinary rounds, monitoring through weekly patient care conferences    Expected Discharge Date: 20 days - 10/5/2023    Discharge Plan: placement in alternative living arrangement, referral for outpatient medication management with a psychiatrist, referral for outpatient psychotherapy    Treatment Plan Created/Updated By: Isha Flowers MD

## 2023-09-15 NOTE — ASSESSMENT & PLAN NOTE
· Patient has a longstanding history greater than 1 year of active tobacco abuse  · He will be using an nicotine teen patch for NRT replacement

## 2023-09-15 NOTE — PROGRESS NOTES
09/15/23 0843   Team Meeting   Meeting Type Daily Rounds   Team Members Present   Team Members Present Physician;Nurse;   Physician Team Member Marquita   Nursing Team Member [facility] Management Team Member Maria Esther   Patient/Family Present   Patient Present No   Patient's Family Present No     302, from [facility] ED, admitted due to psychosis, auditory hallucinations, delusions. Petitioned by the staff from [facility] in [place]. Readmit score 23. Per ED notes, Pt has a 303 hearing with [employer] today at 9:30 AM. Continue assessment. Continue to monitor.

## 2023-09-15 NOTE — NURSING NOTE
Patient visible on milieu. Patient refused morning PO medications, Zyprexa, patient stated, "I dont take any medications, I only do nicotine gum." Education provided on medication administration. Patient proceeded to refuse and state he doesn't take any medications. Patient denies SI/HI/AH/VH. Patient continues to be disorganized and paranoid. Q 7 minutes safety checks.

## 2023-09-15 NOTE — H&P
Psychiatric Evaluation - Behavioral Health     Identification Data:Sawyer Krishna 43 y.o. male MRN: [id number]  Unit/Bed#: [address] 382-02 Encounter: [id number]    Chief Complaint: unstable mood and psychotic symptoms    History of Present Illness       Cassi Clay is a 43 y.o. male with a history of Bipolar Disorder who was admitted to the inpatient psychiatric unit on a involuntary 302 commitment basis due to unstable mood and psychotic symptoms. Symptoms prior to admission included manic symptoms, increased irritability, auditory hallucinations, visual hallucinations, delusional thinking with grandiose and persecutory delusions, disorganized behavior, disorganized thinking process, difficulty attending to activities of daily living, poor self-care, noncompliance with treatment and noncompliance with medications. Onset of symptoms was gradual starting several days ago with progressively worsening course since that time. Stressors preceding admission included chronic mental illness and noncompliance with treatment. Bea Dawn was brought in to ED by police due to psychotic behavior while at [facility]. He was refusing his medications, was saying that he was "[employer] the Father" and appeared to have conversations with other people who were not there. Per 302 petitioned by staff from [facility] he walked into the road yesterday and needed redirection multiple times to come off the road. He also hid a plastic butter knife in his notebook. While in ED he was uncooperative with assessments and involuntary commitment was upheld. On initial evaluation after admission to the inpatient psychiatric unit Bea Dawn did not want to cooperate with the interview and his psychiatric history was obtained mostly from chart records. He blocked his room door and would answer only a few questions while standing in the entrance to his room.  He said that he was not going to talk until Monday and that he needed to rest. He made multiple paranoid statements about [facility] staff sending him to the hospital claiming that he did not need treatment, did not need any medications and was only going to take nicotine patch. He had pressured, tangential speech and disorganized thinking process. He seemed grandiose and preoccupied. His self-care was poor. He denied suicidal thoughts.      Psychiatric Review Of Systems:    Sleep changes: yes, decreased  Appetite changes: yes, decreased  Weight changes: unknown, unable to obtain  Energy/anergy: yes, increased  Interest/pleasure/anhedonia: no  Somatic symptoms: no  Anxiety/panic: yes  Filomena: yes, past manic episodes, currently manic symptoms  Guilty/hopeless: no  Self injurious behavior/risky behavior: no  Suicidal ideation: denies at present, but walked into a road and was hiding a plastic knife in his notebook prior to admission  Homicidal ideation: no  Auditory hallucinations: denies when asked, but appears responding to internal stimuli  Visual hallucinations: appears responding to internal stimuli  Other hallucinations: no  Delusional thinking: yes, persecutory delusions, grandiose delusions  Eating disorder history: no  Obsessive/compulsive symptoms: no    Historical Information       Past Psychiatric History:     Past Inpatient Psychiatric Treatment:   Several past inpatient psychiatric admissions at [address] [place] [address] in 12/2021 and other facilities  Past Outpatient Psychiatric Treatment:    Noncompliant with outpatient psychiatric treatment prior to admission  Past Suicide Attempts: yes, one attempt (not willing to provide details)  Past Violent Behavior: yes, history of aggressive behavior  Past Psychiatric Medication Trials: Zoloft, Wellbutrin, Depakote, Zyprexa, Ativan and Ritalin     Substance Abuse History:    Social History     Tobacco History     Smoking Status  Every Day Smoking Frequency  1.50 packs/day Smoking Tobacco Type  Cigarettes    Smokeless Tobacco Use  Never Tobacco Comments  Pt unwilling           Alcohol History     Alcohol Use Status  Never          Drug Use     Drug Use Status  Never          Sexual Activity     Sexually Active  Yes Partners  Female          Activities of Daily Living    Not Asked               Additional Substance Use Detail     Questions Responses    Problems Due to Past Use of Alcohol? No    Problems Due to Past Use of Substances?  No    Substance Use Assessment Denies substance use within the past 12 months    Alcohol Use Frequency Denies use in past 12 months    Cannabis frequency Daily    Comment: Daily on 11/30/2021     Heroin Frequency Denies use in past 12 months    Cocaine frequency Never used    Comment: Never used on 11/30/2021     Crack Cocaine Frequency Denies use in past 12 months    Methamphetamine Frequency Denies use in past 12 months    Narcotic Frequency Denies use in past 12 months    Benzodiazepine Frequency Denies use in past 12 months    Amphetamine frequency Denies use in past 12 months    Barbiturate Frequency Denies use in past 12 months    Inhalant frequency Never used    Comment: Never used on 11/30/2021     Hallucinogen frequency Never used    Comment: Never used on 11/30/2021     Ecstasy frequency Never used    Comment: Never used on 11/30/2021     Other drug frequency Never used    Comment: Never used on 11/30/2021     Opiate frequency Denies use in past 12 months    Last reviewed by Ambika Arreguin RN on 9/14/2023        I am unable to assess the patient for substance use within the past 12 months as they are unable or unwilling to answer    Family Psychiatric History:     Psychiatric Illness:  family member - anxiety disorder as per old records  Substance Abuse:  unable to obtain due to lack of cooperation  Suicide Attempts:  unable to obtain due to lack of cooperation    Social History:    Education: some college  Learning Disabilities: ADHD history  Marital History: single  Children: none  Living Arrangement: was staying in [facility] prior to admission   Occupational History: unable to obtain due to lack of cooperation  Functioning Relationships: limited support system  Legal History: past arrest due to theft   History: unable to obtain due to lack of cooperation    Traumatic History:     Abuse: none  Other Traumatic Events: none     Past Medical History:   Diagnosis Date   • Avulsion fracture    • Bipolar disorder ([address])      History reviewed. No pertinent surgical history. Medical Review Of Systems:    A comprehensive review of systems was negative except for: Behavioral/Psych: positive for agitation, irritability, mood instability, poor self care, psychotic symptoms and sleep disturbance    Allergies: Allergies   Allergen Reactions   • Erythromycin Rash   • Sulfa Antibiotics Rash       Medications: All current active medications have been reviewed. Medications prior to admission:    Prior to Admission Medications   Prescriptions Last Dose Informant Patient Reported? Taking?    ARIPiprazole (ABILIFY) 10 mg tablet  Outside Facility (Specify) Yes No   Sig: Take 10 mg by mouth daily   Diclofenac Sodium (VOLTAREN) 1 %   No No   Sig: Apply 2 g topically 4 (four) times a day   Melatonin 10 MG TABS  Outside Facility (Specify) Yes No   Sig: Take 1 mg by mouth daily at bedtime as needed   divalproex sodium (DEPAKOTE) 250 mg DR tablet  Outside Facility (Specify) Yes No   Sig: Take 250 mg by mouth every 12 (twelve) hours   hydrOXYzine pamoate (VISTARIL) 25 mg capsule  Outside Facility (Specify) Yes No   Sig: Take 25 mg by mouth 3 (three) times a day as needed for anxiety   traZODone (DESYREL) 50 mg tablet  Outside Facility (Specify) Yes No   Sig: Take 50 mg by mouth daily at bedtime as needed for sleep      Facility-Administered Medications: None       OBJECTIVE:    Vital signs in last 24 hours:    Temp:  [97.3 °F (36.3 °C)-98.7 °F (37.1 °C)] 97.9 °F (36.6 °C)  HR:  [62-81] 81  Resp:  [14-18] 18  BP: (469-124)/(83-25) 113/59    No intake or output data in the 24 hours ending 09/15/23 1153     Mental Status Evaluation:    Appearance:  disheveled, dressed in hospital attire   Behavior:  agitated, uncooperative, restless   Speech:  pressured, loud, disorganized   Mood:  labile, irritable, manic   Affect:  labile   Language: naming objects and repeating phrases   Thought Process:  disorganized, illogical, increased rate of thoughts   Associations: flight of ideas   Thought Content:  grandiose and persecutory delusions   Perceptual Disturbances: denies auditory or visual hallucinations when asked, but appears responding to internal stimuli   Risk Potential: Suicidal ideation - None at present  Homicidal ideation - None at present  Potential for aggression - Yes, due to poor impulse control   Sensorium:  oriented to person, place and time/date   Memory:  recent and remote memory grossly intact   Consciousness:  alert and awake   Attention/Concentration: poor concentration and poor attention span   Intellect: average   Fund of Knowledge: awareness of current events: yes  past history: yes  vocabulary: normal   Insight:  poor   Judgment: poor   Muscle Strength:   Muscle Tone: normal  normal   Gait/Station: normal gait/station, normal balance   Motor Activity: no abnormal movements       Laboratory Results: I have personally reviewed all pertinent laboratory/tests results    Admission Labs:   Admission on 09/14/2023   Component Date Value   • Sodium 09/15/2023 140    • Potassium 09/15/2023 4.2    • Chloride 09/15/2023 106    • CO2 09/15/2023 27    • ANION GAP 09/15/2023 7    • BUN 09/15/2023 13    • Creatinine 09/15/2023 0.75    • Glucose 09/15/2023 84    • Glucose, Fasting 09/15/2023 84    • Calcium 09/15/2023 9.2    • AST 09/15/2023 26    • ALT 09/15/2023 13    • Alkaline Phosphatase 09/15/2023 43    • Total Protein 09/15/2023 6.6    • Albumin 09/15/2023 4.1    • Total Bilirubin 09/15/2023 0.49    • eGFR 09/15/2023 115 • WBC 09/15/2023 5.35    • RBC 09/15/2023 5.23    • Hemoglobin 09/15/2023 15.3    • Hematocrit 09/15/2023 45.6    • MCV 09/15/2023 87    • MCH 09/15/2023 29.3    • MCHC 09/15/2023 33.6    • RDW 09/15/2023 12.5    • MPV 09/15/2023 10.6    • Platelets 73/53/6532 292    • nRBC 09/15/2023 0    • Neutrophils Relative 09/15/2023 47    • Immat GRANS % 09/15/2023 0    • Lymphocytes Relative 09/15/2023 40    • Monocytes Relative 09/15/2023 8    • Eosinophils Relative 09/15/2023 4    • Basophils Relative 09/15/2023 1    • Neutrophils Absolute 09/15/2023 2.56    • Immature Grans Absolute 09/15/2023 0.01    • Lymphocytes Absolute 09/15/2023 2.12    • Monocytes Absolute 09/15/2023 0.40    • Eosinophils Absolute 09/15/2023 0.21    • Basophils Absolute 09/15/2023 0.05    • TSH 3RD GENERATON 09/15/2023 0.413 (L)    • Cholesterol 09/15/2023 135    • Triglycerides 09/15/2023 125    • HDL, Direct 09/15/2023 38 (L)    • LDL Calculated 09/15/2023 72    • Non-HDL-Chol (CHOL-HDL) 09/15/2023 97    Drug Screen:   Lab Results   Component Value Date    AMPMETHUR Negative 09/13/2023    BARBTUR Negative 09/13/2023    BDZUR Negative 09/13/2023    THCUR Negative 09/13/2023    COCAINEUR Negative 09/13/2023    OPIATEUR Negative 09/13/2023    PCPUR Negative 09/13/2023       Imaging Studies: No results found.     Code Status: Level 1 - Full Code  Advance Directive and Living Will: <no information>    Suicide/Homicide Risk Assessment:    Risk of Harm to Self:   Nursing Suicide Risk Assessment Last 24 hours: C-SSRS Risk (Since Last Contact)  Calculated C-SSRS Risk Score (Since Last Contact): No Risk Indicated  Demographic risk factors include: , male  Historical Risk Factors include: chronic mood disorder, history of suicide attempt, history of impulsive behaviors, history of legal problems  Current Specific Risk Factors include: diagnosis of mood disorder, current unstable mood, current psychotic symptoms, poor reasoning, poor impulse control  Protective Factors: no current suicidal ideation  Weapons/Firearms: none. The following steps have been taken to ensure weapons are properly secured: not applicable  Based on today's assessment, Yogesh Virgen presents the following risk of harm to self: low    Risk of Harm to Others:  Nursing Homicide Risk Assessment: Violence Risk to Others: Denies within past 6 months  Demographic Risk Factors include: male, unemployed, under age 36. Historical Risk Factors include: history of aggressive behavior, prior arrest.  Current Specific Risk Factors include: current agitation, poor impulse control, unstable mood disorder, current psychotic symptoms, poor insight  Protective Factors: no current homicidal ideation  Based on today's assessment, Yogesh Virgen presents the following risk of harm to others: low    The following interventions are recommended: behavioral checks every 7 minutes, continued hospitalization on locked unit     Assessment/Plan   Principal Problem:    Bipolar I disorder, most recent episode manic, severe with psychotic features ([address])  Active Problems:    Medical clearance for psychiatric admission    Tobacco abuse      Patient Strengths: negotiates basic needs     Patient Barriers: chronic mental illness, noncompliant with medication, noncompliant with treatment, patient is on an involuntary commitment, poor insight, poor reasoning ability    Treatment Plan:     Planned Treatment and Medication Changes: All current active medications have been reviewed  Encourage group therapy, milieu therapy and occupational therapy  Behavioral Health checks every 7 minutes  303 hearing was held this morning (Dr. Marichuy Rice testified). Court decision regarding further commitment is pending (likely committed for up to 20 days due to acute psychosis)  On Zyprexa 5 mg bid for treatment of psychosis and mood stabilization - started in ED. He has been refusing oral Zyprexa or any other medication trials.  Will ask covering MD this weekend to place second opinion for injectable medications if he continues to refuse oral Zyprexa. He is not likely to improve without treatment and currently poses a danger to self and others due to acute psychotic symptoms. Recommend Zyprexa 5 mg IM for refusal of oral Zyprexa once second opinion is on the chart  Depakote is on hold currently as he is refusing medications.     Current medications:    Current Facility-Administered Medications   Medication Dose Route Frequency Provider Last Rate   • acetaminophen  650 mg Oral Q6H PRN Donzell Danker, DO     • acetaminophen  650 mg Oral Q4H PRN Donzell Danker, DO     • acetaminophen  975 mg Oral Q6H PRN Donzell Danker, DO     • aluminum-magnesium hydroxide-simethicone  30 mL Oral Q4H PRN Donzell Danbabar, DO     • benztropine  1 mg Intramuscular BID PRN Hugozell Ying, DO     • benztropine  1 mg Oral BID PRN Donzell Danbabar, DO     • hydrOXYzine HCL  50 mg Oral Q6H PRN Max 4/day Chelseal Ying, DO      Or   • diphenhydrAMINE  50 mg Intramuscular Q6H PRN Donzell Danbabar, DO     • glycerin-hypromellose-  1 drop Both Eyes Q3H PRN Chelseal Ying, DO     • hydrOXYzine HCL  100 mg Oral Q6H PRN Max 4/day Donzell Danbabar, DO      Or   • LORazepam  2 mg Intramuscular Q6H PRN Donzell Danbabar, DO     • hydrOXYzine HCL  25 mg Oral Q6H PRN Max 4/day Donzell Danbabar, DO     • melatonin  3 mg Oral HS PRN Apple He, DO     • nicotine  1 patch Transdermal Daily Jen Villegas, DO     • OLANZapine  5 mg Oral BID Donzell Danker, DO     • OLANZapine  10 mg Oral Q3H PRN Max 3/day Donzell Danbbaar, DO      Or   • OLANZapine  10 mg Intramuscular Q3H PRN Max 3/day Donzell Danbabar, DO     • OLANZapine  5 mg Oral Q3H PRN Max 6/day Donzell Danbabar, DO      Or   • OLANZapine  5 mg Intramuscular Q3H PRN Max 6/day Donzell Danbabar, DO     • OLANZapine  2.5 mg Oral Q3H PRN Max 8/day Jenandrei Villegas, DO     • polyethylene glycol  17 g Oral Daily PRN Donzell Ying, DO     • propranolol  10 mg Oral Q8H PRN Jen Ronnie Rodas DO     • senna-docusate sodium  1 tablet Oral Daily PRN See Yung DO       Risks / Benefits of Treatment:    Risks, benefits, and possible side effects of medications explained to patient. Patient refuses treatment and does not have understanding of risks of treatment refusal or benefits of treatment at this time. Patient is not likely to improve without medications and will require administration of injectable medications against his will for refusal of oral medications to assure safety of self and others    Counseling / Coordination of Care:    Patient's presentation on admission and proposed treatment plan discussed with treatment team.  Events leading to admission reviewed with patient. At this time patient has limited understanding of diagnosis, medication changes and treatment plan and will require further explanation. Inpatient Psychiatric Certification:    Estimated length of stay: 20 midnights    Based upon physical, mental and social evaluations, I certify that inpatient psychiatric services are medically necessary for this patient for a duration of 20 midnights for the treatment of Bipolar I disorder, most recent episode manic, severe with psychotic features [facility])  Available alternative community resources do not meet the patient's mental health care needs. I further attest that an established written individualized plan of care has been implemented and is outlined in the patient's medical records.   The patient has been released from the Emergency Department and medically cleared as per Emergency Department documentation for psychiatric admission for Bipolar I disorder, most recent episode manic, severe with psychotic features ([address] [facility])    Neva Flores MD 09/15/23

## 2023-09-15 NOTE — NURSING NOTE
Patient was brought into [facility] ER by police on a 818 petitioned by [facility] due to patient noncompliance with medications, active auditory hallucinations, paranoia, delusions of grandeur, responding to internal stimuli, and running into oncoming traffic. While in ER patient was reportedly uncooperative with staff, increasingly psychotic and agitated, refused oral medications and was given PRN Haldol, Ativan, and Benadryl IM which was reportedly effective. Patient arrived on 3P IP unit via stretcher, accompanied by transport and security. 303 court hearing scheduled for Friday 09/15/2023 at 9:30am. Patient is cooperative with admission assessment. He denies all psychotic symptoms stating "I'm totally ok. I am [employer] and [employer]. I own [facility]". Patient reports that he does not take any medications stating "I'm a , I don't need medications". Patient speech is rapid and pressured, thought process disorganized, tangential, with flight of ideas. Patient also presents with paranoid delusions that he was being poisoned. Patient denies SI/HI/AVH. He denies all psychiatric symptoms, no insight to mental illness or reason for admission. Hx of previous [facility] admission to [address] [place] [address] Road To [facility] in 2021. No med hx. VSS. UDS negative. BAL negative. Denies pain or discomfort. Treatment plan and education initiated. Unit orientation completed. Q 7 minute safety checks maintained.

## 2023-09-15 NOTE — CASE MANAGEMENT
Received a phone call from Gabrielle at [facility] [place] inquiring about pt's [phone],  and whether a hearing done with [facility] or if need a 303 hearing is required, suggested that existing 303 be faxed to [facility] [place]. Based on earlier developments, writer advised Gabrielle that [facility] court has already upheld pt's 303 earlier for 20 days and the writer is awaiting the court order. Writer shared contacts of Katja Kinney at [facility] [phone] for further communication.

## 2023-09-15 NOTE — NURSING NOTE
Patient was brought into [facility] ER by police on a 078 petitioned by [facility] due to patient noncompliance with medications, active auditory hallucinations, paranoia, delusions of grandeur, responding to internal stimuli, and running into oncoming traffic. While in ER patient was reportedly uncooperative with staff, increasingly psychotic and agitated, refused oral medications and was given PRN Haldol, Ativan, and Benadryl IM which was reportedly effective. Patient arrived on 3P IP unit via wheelchair, accompanied by transport and security. 303 court hearing scheduled for Friday 09/15/2023 at 9:30am. Patient is cooperative with admission assessment. He denies all psychotic symptoms stating "I'm totally ok. I am [employer] and [employer]. I own [facility]". Patient reports that he does not take any medications stating "I'm a , I don't need medications". Patient speech is rapid and pressured, thought process disorganized, tangential, with flight of ideas. Patient also presents with paranoid delusions that he was being poisoned. Patient denies SI/HI/AVH. He denies all psychiatric symptoms, no insight to mental illness or reason for admission. Hx of previous 921 [facility] admission to [address] [place] [address] To [facility] in 2021. No med hx. VSS. UDS negative. BAL negative. Denies pain or discomfort. Treatment plan and education initiated. Unit orientation completed. Q 7 minute safety checks maintained.

## 2023-09-15 NOTE — ASSESSMENT & PLAN NOTE
· Vital signs stable afebrile patient seen and examined by myself all labs from 9/15/2023 pending  · Patient medically cleared for admit  · SL IM will sign off please call with any questions or concerns

## 2023-09-15 NOTE — CASE MANAGEMENT
Writer met with patient for assessment. Pt was able to walk to the nearest office to meet with the writer. Within a minute, pt interupted while writer was introducing himself as , Pt restless unable to stay still, moves back and forth from chair where pt briefly sat for a moment. Pt asks, "What are you doctor, psychologist or a nurse? . I do not think your a . You are not even letting me speak". Pt walked out of the office. Pt appears responding to internal stimuli and paranoid as pt looks abruptsly looks on the side and talk to self. Writer unable to complete the assessment. Wrier to attempt at a later time.

## 2023-09-15 NOTE — TREATMENT TEAM
09/15/23 1226   Team Meeting   Meeting Type Tx Team Meeting   Initial Conference Date 09/14/23   Next Conference Date 10/13/23   Team Members Present   Team Members Present Physician;Nurse;   Physician Team Member Marquita   Nursing Team Member [facility] Management Team Member Maria Esther   Patient/Family Present   Patient Present Yes   Patient's Family Present No     Treatment plan goal reviewed. Patient is not in agreement and declined to sign tx plan. Pt appears disorganized and paranoid. Continue to monitor.

## 2023-09-15 NOTE — CONSULTS
[address] [facility]  Consult  Name: Aguila Carrion 43 y.o. male I MRN: [id number]  Unit/Bed#: Sima Class 748-65 I Date of Admission: 9/14/2023   Date of Service: 9/15/2023 I Hospital Day: 1    Inpatient consult for Medical Clearance for [facility] patient  Consult performed by: MICHELLE De La O  Consult ordered by: Quang Baer,           Assessment/Plan   Medical clearance for psychiatric admission  Assessment & Plan  · Vital signs stable afebrile patient seen and examined by myself all labs from 9/15/2023 pending  · Patient medically cleared for admit  · [facility] will sign off please call with any questions or concerns    Tobacco abuse  Assessment & Plan  · Patient has a longstanding history greater than 1 year of active tobacco abuse  · He will be using an nicotine teen patch for NRT replacement           Counseling / Coordination of Care Time: 45 minutes. Greater than 50% of total time spent on patient counseling and coordination of care. Collaboration of Care: Were Recommendations Directly Discussed with Primary Treatment Team? - No     History of Present Illness:    Aguila Carrion is a 43 y.o. male who is originally admitted to the psychiatry service due to psychosis. We are consulted for medical clearance for admission to [address] [place] [address] and treatment of underlying psychiatric illness. Patient presents to ER [facility] 8/16/8727 via police escort with a 807 from [facility] patient has been there since 9/1/2023 not taking his meds today he was found standing in the road. While in the ER he was very loud and verbally abusive to the staff refusing to take his medications. Past medical history is significant for bipolar 1 he has had a [address] admission back in 2021 as well as being at [facility] since 9/1/2023 past medical history is also significant for tobacco abuse. Review of Systems:    Review of Systems   Constitutional: Negative for chills and fever.    HENT: Negative for ear pain and sore throat. Eyes: Negative for pain and visual disturbance. Respiratory: Negative for cough and shortness of breath. Cardiovascular: Negative for chest pain and palpitations. Gastrointestinal: Negative for abdominal pain and vomiting. Genitourinary: Negative for dysuria and hematuria. Musculoskeletal: Negative for arthralgias and back pain. Skin: Negative for color change and rash. Neurological: Negative for seizures and syncope. Psychiatric/Behavioral: The patient is not hyperactive. All other systems reviewed and are negative. Past Medical and Surgical History:     Past Medical History:   Diagnosis Date   • Avulsion fracture    • Bipolar disorder ([address])        History reviewed. No pertinent surgical history. Meds/Allergies:    all medications and allergies reviewed    Allergies: Allergies   Allergen Reactions   • Erythromycin Rash   • Sulfa Antibiotics Rash       Social History:     Marital Status: Single    Substance Use History:   Social History     Substance and Sexual Activity   Alcohol Use Never     Social History     Tobacco Use   Smoking Status Every Day   • Packs/day: 1.50   • Types: Cigarettes   Smokeless Tobacco Never   Tobacco Comments    Pt unwilling      Social History     Substance and Sexual Activity   Drug Use Never       Family History:    non-contributory    Physical Exam:     Vitals:   Blood Pressure: 113/59 (09/15/23 0742)  Pulse: 81 (09/15/23 0742)  Temperature: 97.9 °F (36.6 °C) (09/15/23 0742)  Temp Source: Temporal (09/15/23 0742)  Respirations: 18 (09/15/23 0742)  Height: 5' 11" (180.3 cm) (09/14/23 2142)  Weight - Scale: 73.6 kg (162 lb 3.2 oz) (09/14/23 2142)  SpO2: 98 % (09/15/23 0742)    Physical Exam  HENT:      Head: Normocephalic and atraumatic. Nose: Nose normal.      Mouth/Throat:      Mouth: Mucous membranes are moist.   Eyes:      Extraocular Movements: Extraocular movements intact.    Cardiovascular:      Rate and Rhythm: Normal rate and regular rhythm. Pulmonary:      Effort: Pulmonary effort is normal.      Breath sounds: Normal breath sounds. Abdominal:      General: Abdomen is flat. Bowel sounds are normal.   Musculoskeletal:         General: Normal range of motion. Cervical back: Normal range of motion. Skin:     General: Skin is warm and dry. Neurological:      Mental Status: He is alert and oriented to person, place, and time. Additional Data:     Lab Results: I have personally reviewed pertinent reports. Results from last 7 days   Lab Units 09/15/23  0638   WBC Thousand/uL 5.35   HEMOGLOBIN g/dL 15.3   HEMATOCRIT % 45.6   PLATELETS Thousands/uL 292   NEUTROS PCT % 47   LYMPHS PCT % 40   MONOS PCT % 8   EOS PCT % 4     Results from last 7 days   Lab Units 09/15/23  0639   SODIUM mmol/L 140   POTASSIUM mmol/L 4.2   CHLORIDE mmol/L 106   CO2 mmol/L 27   BUN mg/dL 13   CREATININE mg/dL 0.75   ANION GAP mmol/L 7   CALCIUM mg/dL 9.2   ALBUMIN g/dL 4.1   TOTAL BILIRUBIN mg/dL 0.49   ALK PHOS U/L 43   ALT U/L 13   AST U/L 26   GLUCOSE RANDOM mg/dL 84             Lab Results   Component Value Date/Time    HGBA1C 5.6 12/07/2021 07:31 PM           EKG, Pathology, and Other Studies Reviewed on Admission:   · EKG pending    ** Please Note: This note has been constructed using a voice recognition system. **    I have spent a total time of 45 minutes on 09/15/23 in caring for this patient including Patient and family education, Impressions, Counseling / Coordination of care, Documenting in the medical record, Reviewing / ordering tests, medicine, procedures  , Obtaining or reviewing history   and Communicating with other healthcare professionals .

## 2023-09-15 NOTE — CASE MANAGEMENT
Per nursing staff, pt received a phone call from [employer] from [facility]. In patient's presence, writer called-in using [employer] details for the 303 hearing. No one was on the call. Writer waited few minutes. And then, called [facility] at [phone]; spoke to Kristine Buitrago, who advised that 303 hearing was short as pt was in agreement with the petition, therefore the  waived pt's presence. Kristine Buitrago further advised that pt's 303 is upheld up to 20 days, which expires on 10/5/23. Writer provided e-mail address for court order be received.

## 2023-09-16 PROCEDURE — 99232 SBSQ HOSP IP/OBS MODERATE 35: CPT | Performed by: PSYCHIATRY & NEUROLOGY

## 2023-09-16 RX ORDER — OLANZAPINE 10 MG/1
10 TABLET, ORALLY DISINTEGRATING ORAL
Status: DISCONTINUED | OUTPATIENT
Start: 2023-09-16 | End: 2023-09-18

## 2023-09-16 RX ORDER — OLANZAPINE 10 MG/1
10 INJECTION, POWDER, LYOPHILIZED, FOR SOLUTION INTRAMUSCULAR
Status: DISCONTINUED | OUTPATIENT
Start: 2023-09-16 | End: 2023-09-18

## 2023-09-16 RX ADMIN — ACETAMINOPHEN 650 MG: 325 TABLET ORAL at 19:28

## 2023-09-16 RX ADMIN — NICOTINE POLACRILEX 2 MG: 2 GUM, CHEWING BUCCAL at 19:29

## 2023-09-16 RX ADMIN — NICOTINE POLACRILEX 2 MG: 2 GUM, CHEWING BUCCAL at 10:11

## 2023-09-16 RX ADMIN — NICOTINE POLACRILEX 2 MG: 2 GUM, CHEWING BUCCAL at 21:41

## 2023-09-16 RX ADMIN — NICOTINE POLACRILEX 2 MG: 2 GUM, CHEWING BUCCAL at 13:19

## 2023-09-16 RX ADMIN — OLANZAPINE 10 MG: 10 TABLET, ORALLY DISINTEGRATING ORAL at 21:30

## 2023-09-16 NOTE — NURSING NOTE
Patient approached the nursing station and complained of 4/10 pain in their mouth. Stated they have ongoing tooth pain. Requested and received PRN tylenol 650 mg at 1928 for moderate pain.

## 2023-09-16 NOTE — PLAN OF CARE
Problem: Alteration in Thoughts and Perception  Goal: Treatment Goal: Gain control of psychotic behaviors/thinking, reduce/eliminate presenting symptoms and demonstrate improved reality functioning upon discharge  Outcome: Not Progressing  Goal: Refrain from acting on delusional thinking/internal stimuli  Description: Interventions:  - Monitor patient closely, per order   - Utilize least restrictive measures   - Set reasonable limits, give positive feedback for acceptable   - Administer medications as ordered and monitor of potential side effects  Outcome: Not Progressing  Goal: Agree to be compliant with medication regime, as prescribed and report medication side effects  Description: Interventions:  - Offer appropriate PRN medication and supervise ingestion; conduct AIMS, as needed   Outcome: Not Progressing  Goal: Complete daily ADLs, including personal hygiene independently, as able  Description: Interventions:  - Observe, teach, and assist patient with ADLS  - Monitor and promote a balance of rest/activity, with adequate nutrition and elimination   Outcome: Progressing     Problem: Ineffective Coping  Goal: Participates in unit activities  Description: Interventions:  - Provide therapeutic environment   - Provide required programming   - Redirect inappropriate behaviors   Outcome: Not Progressing     Problem: PSYCHOSIS  Goal: Will report no hallucinations or delusions  Description: Interventions:  - Administer medication as  ordered  - Every waking shifts and PRN assess for the presence of hallucinations and or delusions  - Assist with reality testing to support increasing orientation  - Assess if patient's hallucinations or delusions are encouraging self-harm or harm to others and intervene as appropriate  Outcome: Progressing     Problem: INVOLUNTARY ADMIT  Goal: Will cooperate with staff recommendations and doctor's orders and will demonstrate appropriate behavior  Description: INTERVENTIONS:  - Treat underlying conditions and offer medication as ordered  - Educate regarding involuntary admission procedures and rules  - Utilize positive consistent limit setting strategies to support patient and staff safety  Outcome: Not Progressing
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
## 2023-09-16 NOTE — PROGRESS NOTES
Progress Note - 436 [facility] 43 y.o. male MRN: @MRN   Unit/Bed#: SEG[facility] [place] 574-57 Encounter: [id number]    Per nursing report and sign out, patient is religiously occupied, paranoid, responding to internal stimuli    Michael Macario reports today that he does not belong here, despite acknowledging that he agreed to 303 / 20 days. He notes 'so what happened was a the rehab' he was getting food poisoning from the juice machine, but feels it all is better now. I asked him about BPAD and he notes "that is a lie", focused on healthy living, no medication. Plans to go to school. "I am incredibly smart and very wise. I plan to become a doctor." Note he was hospitalized 'for nervous breakdowns when I would not vent and get things out'. Very poor insight and dismissive of concerns, past events, and while he denies paranoia he presents as paranoid, guarded, grandiose despite efforts to present with a constricted, limited depth story of events that he strongly stuck to. There was no flexibilty of thought or logical discussion that could take place. He kept reverting to that he has mild anxiety, that turns into a nervous break down occasionally when he does not have art and people to talk to, and then he gets through it. Multiple grandiose statements today, but no Christian statements with me. Denies SI, HI, AVH but again very guarded and uncooperative when I tried to explore what happened, what is going on, and to engage him on why I am concerned that he does in fact need medications despite his refusal. I brought up meds over objection and he looked agitated, keeping it in, and stood abruptly and said he does not need them, will not take them, and that we cannot force him to do that. He terminated conversation without interest in further dialogue.     Energy: good  Sleep: normal  Appetite: normal  Medication side effects: No   ROS: all other systems are negative    Mental Status Evaluation:    Appearance:  age appropriate   Behavior:  guarded, poor eye contact   Speech:  Normal volume, regular rate and rhythm   Mood:  euthymic   Affect:  restricted       Thought Process:  perserverative, illogical, concrete   Associations: perseveration   Thought Content:  paranoid ideation, grandiose, obsessive   Perceptual Disturbances: Denies AVH but has been observed to RIS   Risk Potential: Suicidal ideation - None at present  Homicidal ideation - None at present  Potential for aggression - Yes, due to poor impulse control   Sensorium:  Fully oriented to person, place, time/date   Cognition:  grossly intact   Consciousness:  Alert & Awake   Memory:  recent and remote memory grossly intact   Attention: attention span and concentration appear shorter than expected for age           Insight:  poor   Judgment: poor   Muscle Strength  Muscle Tone normal  normal   Gait/Station: normal gait/station with good balance   Motor Activity: no abnormal movements       Vital signs in last 24 hours:    Temp:  [97.9 °F (36.6 °C)-98 °F (36.7 °C)] 97.9 °F (36.6 °C)  HR:  [80-90] 85  Resp:  [16] 16  BP: ()/(55-74) 99/55    Laboratory results:  I have personally reviewed all pertinent laboratory/tests results. Assessment/Plan   Principal Problem:    Bipolar I disorder, most recent episode manic, severe with psychotic features [facility])  Active Problems:    Medical clearance for psychiatric admission    Tobacco abuse      Faiza Campos is continuing to refuse treatment and is in clear need of it. He is smart I believe and also very guarded and minimizing. He has clear manic history and manic symptoms requiring this admission as well. He is now on 303 and I am concerned for his safety if he were not to get his mental illness medically stabilized. I agree with the need for injectable antipsychotic medications if he refuses oral medications. Ryan Milian has long history of psychiatric illness and is not likely to improve without medications.   Without treatment Chele Jean poses a danger to self due to inability to care for his self and I am concerned he would put self or others at risk of serious harm due to his detachment from rational and reality based thinking. Chele Jean refuses treatment and does not have understanding of risks of treatment refusal or benefits of treatment at this time. Chele Jean is not likely to improve without medications and will require administration of injectable medications against Chele Jean will to assure safety of self and others  I believe that Chele Jean requires administration of injectable medications against his will to assure safety of self and others. Recommended Treatment:     Planned medication and treatment changes: All current active medications have been reviewed.   Encourage group therapy, milieu therapy and occupational therapy  [facility] checks as unit standard unless ordered or noted otherwise      Current Facility-Administered Medications   Medication Dose Route Frequency Provider Last Rate   • acetaminophen  650 mg Oral Q6H PRN Leontine Locker, DO     • acetaminophen  650 mg Oral Q4H PRN Leontine Locker, DO     • acetaminophen  975 mg Oral Q6H PRN Leontine Locker, DO     • aluminum-magnesium hydroxide-simethicone  30 mL Oral Q4H PRN Leontine Locker, DO     • benztropine  1 mg Intramuscular BID PRN Leontine Locker, DO     • benztropine  1 mg Oral BID PRN Leontine Locker, DO     • hydrOXYzine HCL  50 mg Oral Q6H PRN Max 4/day Leontine Locker, DO      Or   • diphenhydrAMINE  50 mg Intramuscular Q6H PRN Leontine Locker, DO     • glycerin-hypromellose-  1 drop Both Eyes Q3H PRN Leontine Locker, DO     • hydrOXYzine HCL  100 mg Oral Q6H PRN Max 4/day Leontine Locker, DO      Or   • LORazepam  2 mg Intramuscular Q6H PRN Leontine Locker, DO     • hydrOXYzine HCL  25 mg Oral Q6H PRN Max 4/day Leontine Locker, DO     • melatonin  3 mg Oral HS PRN Leontine Locker, DO     • nicotine  1 patch Transdermal Daily Leontine Locker, DO     • nicotine polacrilex  2 mg Oral Q2H PRN MICHELLE Kitchen     • OLANZapine  5 mg Oral BID Neff Libertad, DO     • OLANZapine  10 mg Oral Q3H PRN Max 3/day Tawanda Maurer, DO      Or   • OLANZapine  10 mg Intramuscular Q3H PRN Max 3/day Neff Navajo, DO     • OLANZapine  5 mg Oral Q3H PRN Max 6/day Tawanda Maurer, DO      Or   • OLANZapine  5 mg Intramuscular Q3H PRN Max 6/day Tawanda Escobarie, DO     • OLANZapine  2.5 mg Oral Q3H PRN Max 8/day Jen Villegas, DO     • polyethylene glycol  17 g Oral Daily PRN Neff Libertad, DO     • propranolol  10 mg Oral Q8H PRN Tawanda Escobarie, DO     • senna-docusate sodium  1 tablet Oral Daily PRN Tawanda Escobarie, DO           1) Medication over objection will be initiated, change zyprexa to 10mg HS (instead of 5mg BID)  2) continue treatment otherwise  3) Continue to support patient and engage them in the programs available as feasible and appropriate. Continue case management support and therapy. Continue discharge planning. Risks / Benefits of Treatment:    Risks, benefits, and possible side effects of medications explained to patient and patient verbalizes understanding. At this time patient does not want to start medications. Counseling / Coordination of Care:    Patient's progress discussed with staff in treatment team meeting. Medications, treatment progress and treatment plan reviewed with patient.       Margret Grimes III, DO  9/16/2023  10:17 AM

## 2023-09-17 PROCEDURE — 99232 SBSQ HOSP IP/OBS MODERATE 35: CPT | Performed by: PSYCHIATRY & NEUROLOGY

## 2023-09-17 RX ADMIN — OLANZAPINE 10 MG: 10 TABLET, ORALLY DISINTEGRATING ORAL at 21:10

## 2023-09-17 RX ADMIN — NICOTINE POLACRILEX 2 MG: 2 GUM, CHEWING BUCCAL at 19:02

## 2023-09-17 RX ADMIN — ACETAMINOPHEN 650 MG: 325 TABLET ORAL at 21:13

## 2023-09-17 RX ADMIN — NICOTINE POLACRILEX 2 MG: 2 GUM, CHEWING BUCCAL at 09:14

## 2023-09-17 NOTE — NURSING NOTE
Patient was visible out of their room this evening and calm, more talkative compared to last night. Stated at the start of the shift that they plan to take their scheduled medication that the doctor prescribed for them, but expressed they would like to take the lowest dose possible. Patient was medication compliant. Refused water from medication water cup, wanted water from the sink. Denied depression, anxiety, SI, HI, and hallucinations of any kind. Able to make their needs known throughout the evening and denies any unmet needs at this time.
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
## 2023-09-17 NOTE — NURSING NOTE
Upon reassessment one hour later at 2028 from the Prn tylenol 650 mg that the patient received at 1928 patient reports that the tylenol helped and now rates their pain as 1/10. Medication effective.

## 2023-09-18 PROCEDURE — 99232 SBSQ HOSP IP/OBS MODERATE 35: CPT | Performed by: PSYCHIATRY & NEUROLOGY

## 2023-09-18 RX ORDER — OLANZAPINE 10 MG/1
10 INJECTION, POWDER, LYOPHILIZED, FOR SOLUTION INTRAMUSCULAR
Status: DISCONTINUED | OUTPATIENT
Start: 2023-09-18 | End: 2023-09-19

## 2023-09-18 RX ADMIN — NICOTINE POLACRILEX 2 MG: 2 GUM, CHEWING BUCCAL at 08:22

## 2023-09-18 RX ADMIN — NICOTINE POLACRILEX 2 MG: 2 GUM, CHEWING BUCCAL at 21:08

## 2023-09-18 RX ADMIN — OLANZAPINE 15 MG: 10 TABLET, ORALLY DISINTEGRATING ORAL at 21:06

## 2023-09-18 RX ADMIN — NICOTINE POLACRILEX 2 MG: 2 GUM, CHEWING BUCCAL at 15:47

## 2023-09-18 RX ADMIN — NICOTINE POLACRILEX 2 MG: 2 GUM, CHEWING BUCCAL at 14:04

## 2023-09-18 NOTE — PLAN OF CARE
Problem: Alteration in Thoughts and Perception  Goal: Agree to be compliant with medication regime, as prescribed and report medication side effects  Description: Interventions:  - Offer appropriate PRN medication and supervise ingestion; conduct AIMS, as needed   Outcome: Progressing  Goal: Complete daily ADLs, including personal hygiene independently, as able  Description: Interventions:  - Observe, teach, and assist patient with ADLS  - Monitor and promote a balance of rest/activity, with adequate nutrition and elimination   Outcome: Progressing     Problem: INVOLUNTARY ADMIT  Goal: Will cooperate with staff recommendations and doctor's orders and will demonstrate appropriate behavior  Description: INTERVENTIONS:  - Treat underlying conditions and offer medication as ordered  - Educate regarding involuntary admission procedures and rules  - Utilize positive consistent limit setting strategies to support patient and staff safety  Outcome: Progressing

## 2023-09-18 NOTE — NURSING NOTE
Upon reassessment at 2213 from the PRN tylenol 650 mg patient received at 2113 patient is observed resting in bed without any outward signs of distress. Respirations even and unlabored. Medication appears effective.

## 2023-09-18 NOTE — PROGRESS NOTES
09/18/23 0859   Team Meeting   Meeting Type Daily Rounds   Team Members Present   Team Members Present Physician;Nurse;   Physician Team Member Marquita   Nursing Team Member Elvin Riojas [facility]    Care Management Team Member Maria Esther   Patient/Family Present   Patient Present No   Patient's Family Present No     303, Pt remains paranoid of being poisoned. Largely isolative, however, visible and Compliant with meds and meals. Given prn Tylenol. Was non-compliant with initial assessment and refused any ROIs. Continue Zyprexa. Continue to monitor. Discharge to be determined.

## 2023-09-18 NOTE — NURSING NOTE
Patient was visible out of their room this evening. Denied depression, anxiety, SI, HI, and hallucinations of any kind. Medication compliant. Stated that they have been using nicotine gum to get by but would like to quit smoking. Able to make needs known throughout the evening. Retreated to bed on their own without any issues.

## 2023-09-18 NOTE — NURSING NOTE
Patient complained of 2/10 pain in their lower back. Received PRN tylenol 650 mg for mild pain at 2113.

## 2023-09-18 NOTE — NURSING NOTE
Patient is visible in the dayroom, attending morning groups and socializing with peers. Patient is calm and cooperative upon approach. Patient reported, "I feel like the night medications are making me feel better, I felt like I really needed that medicine." Patient denies SI/HI/AH/VH. Patient is compliant with meds and meals.
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
## 2023-09-19 PROBLEM — K08.89 PAIN, DENTAL: Status: ACTIVE | Noted: 2023-09-19

## 2023-09-19 LAB
ATRIAL RATE: 58 BPM
ATRIAL RATE: 58 BPM
P AXIS: 59 DEGREES
P AXIS: 60 DEGREES
PR INTERVAL: 154 MS
PR INTERVAL: 158 MS
QRS AXIS: 75 DEGREES
QRS AXIS: 77 DEGREES
QRSD INTERVAL: 100 MS
QRSD INTERVAL: 106 MS
QT INTERVAL: 406 MS
QT INTERVAL: 406 MS
QTC INTERVAL: 398 MS
QTC INTERVAL: 398 MS
T WAVE AXIS: 61 DEGREES
T WAVE AXIS: 64 DEGREES
VENTRICULAR RATE: 58 BPM
VENTRICULAR RATE: 58 BPM

## 2023-09-19 PROCEDURE — 99232 SBSQ HOSP IP/OBS MODERATE 35: CPT | Performed by: PSYCHIATRY & NEUROLOGY

## 2023-09-19 PROCEDURE — 99231 SBSQ HOSP IP/OBS SF/LOW 25: CPT | Performed by: PHYSICIAN ASSISTANT

## 2023-09-19 PROCEDURE — 93005 ELECTROCARDIOGRAM TRACING: CPT

## 2023-09-19 PROCEDURE — 93010 ELECTROCARDIOGRAM REPORT: CPT | Performed by: INTERNAL MEDICINE

## 2023-09-19 RX ORDER — OLANZAPINE 10 MG/1
20 TABLET, ORALLY DISINTEGRATING ORAL
Status: DISCONTINUED | OUTPATIENT
Start: 2023-09-20 | End: 2023-09-25

## 2023-09-19 RX ORDER — OLANZAPINE 10 MG/1
10 INJECTION, POWDER, LYOPHILIZED, FOR SOLUTION INTRAMUSCULAR
Status: DISCONTINUED | OUTPATIENT
Start: 2023-09-19 | End: 2023-09-19

## 2023-09-19 RX ORDER — OLANZAPINE 10 MG/1
10 INJECTION, POWDER, LYOPHILIZED, FOR SOLUTION INTRAMUSCULAR
Status: DISCONTINUED | OUTPATIENT
Start: 2023-09-20 | End: 2023-09-25

## 2023-09-19 RX ORDER — OLANZAPINE 10 MG/1
20 TABLET, ORALLY DISINTEGRATING ORAL
Status: DISCONTINUED | OUTPATIENT
Start: 2023-09-19 | End: 2023-09-19

## 2023-09-19 RX ORDER — OLANZAPINE 10 MG/1
10 INJECTION, POWDER, LYOPHILIZED, FOR SOLUTION INTRAMUSCULAR
Status: COMPLETED | OUTPATIENT
Start: 2023-09-19 | End: 2023-09-19

## 2023-09-19 RX ADMIN — NICOTINE POLACRILEX 2 MG: 2 GUM, CHEWING BUCCAL at 20:51

## 2023-09-19 RX ADMIN — NICOTINE POLACRILEX 2 MG: 2 GUM, CHEWING BUCCAL at 16:09

## 2023-09-19 RX ADMIN — OLANZAPINE 15 MG: 10 TABLET, ORALLY DISINTEGRATING ORAL at 20:51

## 2023-09-19 RX ADMIN — NICOTINE POLACRILEX 2 MG: 2 GUM, CHEWING BUCCAL at 12:25

## 2023-09-19 RX ADMIN — ACETAMINOPHEN 650 MG: 325 TABLET ORAL at 16:09

## 2023-09-19 NOTE — PROGRESS NOTES
09/19/23 0837   Team Meeting   Meeting Type Daily Rounds   Team Members Present   Team Members Present Physician;Nurse;   Physician Team Member [id number] Team Member [facility]    Care Management Team Member Maria Esther   Patient/Family Present   Patient Present No   Patient's Family Present No     303, remains paranoid about being poisoned. Visible and compliant with meds and meals. Refused EKG. Continue med management -titrate Zyprexa. Continue to monitor. Discharge to be determined.

## 2023-09-19 NOTE — PLAN OF CARE
Problem: Alteration in Thoughts and Perception  Goal: Treatment Goal: Gain control of psychotic behaviors/thinking, reduce/eliminate presenting symptoms and demonstrate improved reality functioning upon discharge  Outcome: Progressing  Goal: Verbalize thoughts and feelings  Description: Interventions:  - Promote a nonjudgmental and trusting relationship with the patient through active listening and therapeutic communication  - Assess patient's level of functioning, behavior and potential for risk  - Engage patient in 1 on 1 interactions  - Encourage patient to express fears, feelings, frustrations, and discuss symptoms    - [place] patient to reality, help patient recognize reality-based thinking   - Administer medications as ordered and assess for potential side effects  - Provide the patient education related to the signs and symptoms of the illness and desired effects of prescribed medications  Outcome: Progressing  Goal: Refrain from acting on delusional thinking/internal stimuli  Description: Interventions:  - Monitor patient closely, per order   - Utilize least restrictive measures   - Set reasonable limits, give positive feedback for acceptable   - Administer medications as ordered and monitor of potential side effects  Outcome: Progressing  Goal: Agree to be compliant with medication regime, as prescribed and report medication side effects  Description: Interventions:  - Offer appropriate PRN medication and supervise ingestion; conduct AIMS, as needed   Outcome: Progressing  Goal: Attend and participate in unit activities, including therapeutic, recreational, and educational groups  Description: Interventions:  -Encourage Visitation and family involvement in care  Outcome: Progressing  Goal: Recognize dysfunctional thoughts, communicate reality-based thoughts at the time of discharge  Description: Interventions:  - Provide medication and psycho-education to assist patient in compliance and developing insight into his/her illness   Outcome: Progressing  Goal: Complete daily ADLs, including personal hygiene independently, as able  Description: Interventions:  - Observe, teach, and assist patient with ADLS  - Monitor and promote a balance of rest/activity, with adequate nutrition and elimination   Outcome: Progressing     Problem: Ineffective Coping  Goal: Demonstrates healthy coping skills  Outcome: Progressing  Goal: Participates in unit activities  Description: Interventions:  - Provide therapeutic environment   - Provide required programming   - Redirect inappropriate behaviors   Outcome: Progressing     Problem: DISCHARGE PLANNING  Goal: Discharge to home or other facility with appropriate resources  Description: INTERVENTIONS:  - Identify barriers to discharge w/patient and caregiver  - Arrange for needed discharge resources and transportation as appropriate  - Identify discharge learning needs (meds, wound care, etc.)  - Arrange for interpretive services to assist at discharge as needed  - Refer to Case Management Department for coordinating discharge planning if the patient needs post-hospital services based on physician/advanced practitioner order or complex needs related to functional status, cognitive ability, or social support system  Outcome: Progressing     Problem: PSYCHOSIS  Goal: Will report no hallucinations or delusions  Description: Interventions:  - Administer medication as  ordered  - Every waking shifts and PRN assess for the presence of hallucinations and or delusions  - Assist with reality testing to support increasing orientation  - Assess if patient's hallucinations or delusions are encouraging self-harm or harm to others and intervene as appropriate  Outcome: Progressing     Problem: INVOLUNTARY ADMIT  Goal: Will cooperate with staff recommendations and doctor's orders and will demonstrate appropriate behavior  Description: INTERVENTIONS:  - Treat underlying conditions and offer medication as ordered  - Educate regarding involuntary admission procedures and rules  - Utilize positive consistent limit setting strategies to support patient and staff safety  Outcome: Progressing

## 2023-09-19 NOTE — PROGRESS NOTES
[facility]  Progress Note  Name: Josephine Heredia  MRN: [id number]  Unit/Bed#: [address]  976-06 I Date of Admission: 2023   Date of Service: 2023 I Hospital Day: 5    Assessment/Plan   Pain, dental  Assessment & Plan  · Patient complaining of pain to right upper gingiva and left lower gingiva  · On evaluation, poor dentition overall. Many broken teeth and carries, gums are pink, no signs of infection  · Patient will need to follow up with dentist for extractions as an outpatient  · Will order anbesol for pain           Nurse Coordination of Care Discussion: discussed assessment and plan with primary RN    Discussions with Specialists or Other Care Team Provider: none    Education and Discussions with Family / Patient: answered patients questions to the best of my abilities    Time Spent for Care: 20 minutes. More than 50% of total time spent on counseling and coordination of care as described above. Current Length of Stay: 5 day(s)    Code Status: Level 1 - Full Code      Subjective:   Patient complaining of left lower and right upper dental pain. He reports this is chronic and he was supposed to have several teeth pulled however was not able to get to oral surgeon. He denies any fever chills, sweats. Only complains of localized tenderness. Objective:     Vitals:   Temp (24hrs), Av.3 °F (36.3 °C), Min:97.3 °F (36.3 °C), Max:97.3 °F (36.3 °C)    Temp:  [97.3 °F (36.3 °C)] 97.3 °F (36.3 °C)  HR:  [70-73] 70  Resp:  [18] 18  BP: (122-125)/(67-76) 122/67  SpO2:  [98 %-99 %] 99 %  Body mass index is 22.98 kg/m². Physical Exam:     Physical Exam  Constitutional:       General: He is not in acute distress. Appearance: Normal appearance. He is not ill-appearing. HENT:      Head: Normocephalic and atraumatic.       Nose: Nose normal.      Mouth/Throat:      Mouth: Mucous membranes are moist.      Comments: Poor dentition, multiple broken teeth and carries  Gums are pink, no signs of infection  Skin:     General: Skin is warm and dry. Neurological:      Mental Status: He is alert. Psychiatric:         Mood and Affect: Mood normal.           Additional Data:     Labs:    Results from last 7 days   Lab Units 09/15/23  0638   WBC Thousand/uL 5.35   HEMOGLOBIN g/dL 15.3   HEMATOCRIT % 45.6   PLATELETS Thousands/uL 292   NEUTROS PCT % 47   LYMPHS PCT % 40   MONOS PCT % 8   EOS PCT % 4     Results from last 7 days   Lab Units 09/15/23  0639   SODIUM mmol/L 140   POTASSIUM mmol/L 4.2   CHLORIDE mmol/L 106   CO2 mmol/L 27   BUN mg/dL 13   CREATININE mg/dL 0.75   ANION GAP mmol/L 7   CALCIUM mg/dL 9.2   ALBUMIN g/dL 4.1   TOTAL BILIRUBIN mg/dL 0.49   ALK PHOS U/L 43   ALT U/L 13   AST U/L 26   GLUCOSE RANDOM mg/dL 84             Results from last 7 days   Lab Units 09/15/23  0638   HEMOGLOBIN A1C % 6.0*         * I Have Reviewed All Lab Data Listed Above. * Additional Pertinent Lab Tests Reviewed:  All Labs Within Last 24 Hours Reviewed    Imaging:    Imaging Reports Reviewed Today Include: No imaging reviewed today      Last 24 Hours Medication List:   Current Facility-Administered Medications   Medication Dose Route Frequency Provider Last Rate   • acetaminophen  650 mg Oral Q6H PRN Sandre [place], DO     • acetaminophen  650 mg Oral Q4H PRN Sandre [place], DO     • acetaminophen  975 mg Oral Q6H PRN Sandre [place], DO     • aluminum-magnesium hydroxide-simethicone  30 mL Oral Q4H PRN Sandre [place], DO     • benzocaine   Mucosal 4x Daily PRN Deann Cavazos PA-C     • benztropine  1 mg Intramuscular BID PRN Sandre [place], DO     • benztropine  1 mg Oral BID PRN Sandre [place], DO     • hydrOXYzine HCL  50 mg Oral Q6H PRN Max 4/day Sandre [place], DO      Or   • diphenhydrAMINE  50 mg Intramuscular Q6H PRN Sandre [place], DO     • glycerin-hypromellose-  1 drop Both Eyes Q3H PRN Sandre [place], DO     • hydrOXYzine HCL  100 mg Oral Q6H PRN Max 4/day Sandre [place], DO      Or • LORazepam  2 mg Intramuscular Q6H PRN Etoile Cranker, DO     • hydrOXYzine HCL  25 mg Oral Q6H PRN Max 4/day Jono Cranker, DO     • melatonin  3 mg Oral HS PRN Jono Cranker, DO     • nicotine  1 patch Transdermal Daily Jen Villegas, DO     • nicotine polacrilex  2 mg Oral Q2H PRN MICHELLE Kitchen     • OLANZapine  15 mg Oral HS Tristian Dumas MD      Or   • OLANZapine  10 mg Intramuscular HS Tristian Dumas MD     • [START ON 9/20/2023] OLANZapine  20 mg Oral HS Tristian Dumas MD      Or   • [START ON 9/20/2023] OLANZapine  10 mg Intramuscular HS Tristian Dumas MD     • OLANZapine  10 mg Oral Q3H PRN Max 3/day Etoile Cranker, DO      Or   • OLANZapine  10 mg Intramuscular Q3H PRN Max 3/day Jono Cranker, DO     • OLANZapine  5 mg Oral Q3H PRN Max 6/day Jono Cranker, DO      Or   • OLANZapine  5 mg Intramuscular Q3H PRN Max 6/day Jono Cranker, DO     • OLANZapine  2.5 mg Oral Q3H PRN Max 8/day Jono Cranker, DO     • polyethylene glycol  17 g Oral Daily PRN Etoile Cranker, DO     • propranolol  10 mg Oral Q8H PRN Etoile Cranker, DO     • senna-docusate sodium  1 tablet Oral Daily PRN Etoile Cranker, DO          Today, Patient Was Seen By: Jose Dow PA-C      ** Please Note: Dictation voice to text software may have been used in the creation of this document.  **

## 2023-09-19 NOTE — NURSING NOTE
Patient visible on the unit and in the day room. Patient calm and cooperative with staff. Patient complaint with HS medication. Took only 10 mg first stated "They changed my medication without telling me. I am only going to take zypreza 10 mg not 15 mg". Patient later agreed to take the 5 mg of Zyprexa to equal the 15 mg. Patient stated "I feel like the Zyprexa is really working better then what I have been taking before. I will take it am sorry I refused before." Patient denies SI/HI/VH/AH. Denies any unmet needs. Plan of care ongoing.

## 2023-09-19 NOTE — CASE MANAGEMENT
Pt signed TOMMY for [facility] Adult probation P[address]. [address] [phone] or [phone]. Writer to call P. O for coordination.

## 2023-09-19 NOTE — PROGRESS NOTES
Progress Note - Humberot Krishna 43 y.o. male MRN: [id number]  Unit/Bed#: U 382-02 Encounter: [id number]    Assessment/Plan   Principal Problem:    Bipolar I disorder, most recent episode manic, severe with psychotic features ([address])  Active Problems:    Medical clearance for psychiatric admission    Tobacco abuse      Recommended Treatment:   Continue Zyprexa 15 mg qHS with plan to increase Zyprexa to 20 mg nightly starting Wednesday for psychosis/mood  Patient is medications over objection. Obtain EKG  Patient added to medical list by RN staff for abscess concerns    Continue with pharmacotherapy, group therapy, milieu therapy and occupational therapy. Continue to assess for adverse medication side effects. Encourage Irma Kinney to participate in nonverbal forms of therapy including journaling and art/music therapy. Continue frequent safety checks and vitals per unit protocol. Continue to engage CM/SW to assist with collateral, disposition planning, and the implementation of an individualized, patient-centered plan of care. Continue medical management by medical team.  Case discussed with treatment team.    Legal Status: 303  ------------------------------------------------------------    Subjective: All documentation including nursing notes, medication history to ensure medication adherence on the unit, labs, and vitals were reviewed. Meena Zaragoza was evaluated this morning for continuity of care and no acute distress noted throughout the evaluation. Over the past 24 hours per nursing report, Meena Zaragoza has been more cooperative on the unit and compliant with medications. Refused EKG, reluctant to take increased Zyprexa dose yesterday night. Today, Meena Zaragoza is consenting for safety on the unit. Meena Zaragoza notes having adequate sleep. Meena Zaragoza states having an adequate appetite without any concerns regarding his food.  Meena Zaragoza has been taking the medications as prescribed and reporting no side effects. He continues to have delusions and paranoia, though is less overt. He reports he refused EKG because of his chest hair but after psychoeducation today, he is agreeable. He does express paranoid ideation regarding staff member needing his personal information for billing/insurance. He reports attending groups. Darvin Verma denies suicidal ideations. Darvin Verma denies homicidal ideations. Regarding hallucinations, Darvin Verma denies. PRNs overnight: Nicotine  VS: Reviewed, T 97.3 F, otherwise within normal limits    Progress Toward Goals: slow improvement    Psychiatric Review of Systems:  Behavior over the last 24 hours: Improving slowly  Sleep: normal  Appetite: normal  Medication side effects: No   ROS: reports shoulder pain and dental pain due to abscess, denies any shortness of breath or chest pain, all other systems are negative    Vital signs in last 24 hours:  Temp:  [97.3 °F (36.3 °C)] 97.3 °F (36.3 °C)  HR:  [70-73] 70  Resp:  [18] 18  BP: (122-125)/(67-76) 122/67    Laboratory results:  I have personally reviewed all pertinent laboratory/tests results. No results found for this or any previous visit (from the past 48 hour(s)).       Mental Status Evaluation:    Appearance:  age appropriate, casually dressed   Behavior:  cooperative, calm, restless and fidgety at times    Speech:  normal volume, increased rate at times but improving, less hypertalkative   Mood:  Euthymic    Affect:  constricted   Thought Process:  less circumstantial, improving rate of thoughts (less increased)   Associations: circumstantial associations, less perseverative   Thought Content:  paranoid ideation, less overtly preoccupied with juice poisoning and filtration, less overtly grandiose   Perceptual Disturbances: Denies auditory or visual hallucinations, appears distracted   Risk Potential: Suicidal ideation - None at present  Homicidal ideation - None at present  Potential for aggression - Yes, due to poor impulse control Sensorium:  oriented to person and situation   Memory:  recent and remote memory grossly intact   Consciousness:  alert and awake   Attention/Concentration: attention span and concentration appear shorter than expected for age and attention span and concentration are improving   Insight:  improving   Judgment: improving   Gait/Station: normal gait/station, normal balance   Motor Activity: no abnormal movements       Current Medications:  Current Facility-Administered Medications   Medication Dose Route Frequency Provider Last Rate   • acetaminophen  650 mg Oral Q6H PRN Donzell Danker, DO     • acetaminophen  650 mg Oral Q4H PRN Donzell Danker, DO     • acetaminophen  975 mg Oral Q6H PRN Donzell Danker, DO     • aluminum-magnesium hydroxide-simethicone  30 mL Oral Q4H PRN Donzell Danker, DO     • benztropine  1 mg Intramuscular BID PRN Donzell Danker, DO     • benztropine  1 mg Oral BID PRN Donzell Danker, DO     • hydrOXYzine HCL  50 mg Oral Q6H PRN Max 4/day Donzell Danbabar, DO      Or   • diphenhydrAMINE  50 mg Intramuscular Q6H PRN Donzell Danker, DO     • glycerin-hypromellose-  1 drop Both Eyes Q3H PRN Donzell Danker, DO     • hydrOXYzine HCL  100 mg Oral Q6H PRN Max 4/day Donzell Danker, DO      Or   • LORazepam  2 mg Intramuscular Q6H PRN Donzell Danker, DO     • hydrOXYzine HCL  25 mg Oral Q6H PRN Max 4/day Donzell Danker, DO     • melatonin  3 mg Oral HS PRN Donzell Danker, DO     • nicotine  1 patch Transdermal Daily Jen Villegas DO     • nicotine polacrilex  2 mg Oral Q2H PRN MICHELLE Kitchen     • OLANZapine  15 mg Oral HS Khushi Mack MD      Or   • OLANZapine  10 mg Intramuscular HS Khushi Mack MD     • [START ON 9/20/2023] OLANZapine  20 mg Oral HS Khushi Mack MD      Or   • [START ON 9/20/2023] OLANZapine  10 mg Intramuscular HS Khushi Mack MD     • OLANZapine  10 mg Oral Q3H PRN Max 3/day Donzell Danbabar, DO      Or   • OLANZapine  10 mg Intramuscular Q3H PRN Max 3/day See Stall, DO     • OLANZapine  5 mg Oral Q3H PRN Max 6/day Thomes Stall, DO      Or   • OLANZapine  5 mg Intramuscular Q3H PRN Max 6/day Thomes Stall, DO     • OLANZapine  2.5 mg Oral Q3H PRN Max 8/day Thomes Stall, DO     • polyethylene glycol  17 g Oral Daily PRN Thomes Stall, DO     • propranolol  10 mg Oral Q8H PRN Thomes Stall, DO     • senna-docusate sodium  1 tablet Oral Daily PRN Thomes Stall, DO       Suicide/Homicide Risk Assessment:  The following interventions are recommended: behavioral checks every 7 minutes, continued hospitalization on locked unit    Behavioral Health Medications: All current active meds have been reviewed. Changes as in plan section above. Risks, benefits and possible side effects of Medications:   Risks, benefits, and possible side effects of medications explained to patient and patient verbalizes understanding. Counseling / Coordination of Care:  Patient's progress discussed with staff in treatment team meeting. Medications, treatment progress and treatment plan reviewed with patient. See Yung,  09/19/23  Psychiatry Resident, PGY-II    This note was completed in part utilizing Dragon dictation Software. Grammatical, translation, syntax errors, random word insertions, spelling mistakes, and incomplete sentences may be an occasional consequence of this system secondary to software limitations with voice recognition, ambient noise, and hardware issues. If you have any questions or concerns about the content, text, or information contained within the body of this dictation, please contact the provider for clarification.

## 2023-09-19 NOTE — NURSING NOTE
Patient visible on unit and in the dayroom. Patient is calm and cooperative. Patient denies SI/HI/AH/VH. Patient denies unmet needs. Patient is compliant with meals.

## 2023-09-19 NOTE — ASSESSMENT & PLAN NOTE
· Patient complaining of pain to right upper gingiva and left lower gingiva  · On evaluation, poor dentition overall.  Many broken teeth and carries, gums are pink, no signs of infection  · Patient will need to follow up with dentist for extractions as an outpatient  · Will order anbesol for pain

## 2023-09-20 LAB
BASOPHILS # BLD AUTO: 0.04 THOUSANDS/ÂΜL (ref 0–0.1)
BASOPHILS NFR BLD AUTO: 1 % (ref 0–1)
EOSINOPHIL # BLD AUTO: 0.28 THOUSAND/ÂΜL (ref 0–0.61)
EOSINOPHIL NFR BLD AUTO: 5 % (ref 0–6)
ERYTHROCYTE [DISTWIDTH] IN BLOOD BY AUTOMATED COUNT: 12.7 % (ref 11.6–15.1)
HCT VFR BLD AUTO: 43.1 % (ref 36.5–49.3)
HGB BLD-MCNC: 14.9 G/DL (ref 12–17)
IMM GRANULOCYTES # BLD AUTO: 0.01 THOUSAND/UL (ref 0–0.2)
IMM GRANULOCYTES NFR BLD AUTO: 0 % (ref 0–2)
LYMPHOCYTES # BLD AUTO: 2.05 THOUSANDS/ÂΜL (ref 0.6–4.47)
LYMPHOCYTES NFR BLD AUTO: 35 % (ref 14–44)
MCH RBC QN AUTO: 29.9 PG (ref 26.8–34.3)
MCHC RBC AUTO-ENTMCNC: 34.6 G/DL (ref 31.4–37.4)
MCV RBC AUTO: 87 FL (ref 82–98)
MONOCYTES # BLD AUTO: 0.59 THOUSAND/ÂΜL (ref 0.17–1.22)
MONOCYTES NFR BLD AUTO: 10 % (ref 4–12)
NEUTROPHILS # BLD AUTO: 2.96 THOUSANDS/ÂΜL (ref 1.85–7.62)
NEUTS SEG NFR BLD AUTO: 49 % (ref 43–75)
NRBC BLD AUTO-RTO: 0 /100 WBCS
PLATELET # BLD AUTO: 239 THOUSANDS/UL (ref 149–390)
PMV BLD AUTO: 10.7 FL (ref 8.9–12.7)
RBC # BLD AUTO: 4.98 MILLION/UL (ref 3.88–5.62)
WBC # BLD AUTO: 5.93 THOUSAND/UL (ref 4.31–10.16)

## 2023-09-20 PROCEDURE — 99232 SBSQ HOSP IP/OBS MODERATE 35: CPT | Performed by: PSYCHIATRY & NEUROLOGY

## 2023-09-20 PROCEDURE — 99231 SBSQ HOSP IP/OBS SF/LOW 25: CPT | Performed by: PHYSICIAN ASSISTANT

## 2023-09-20 PROCEDURE — 85025 COMPLETE CBC W/AUTO DIFF WBC: CPT | Performed by: PHYSICIAN ASSISTANT

## 2023-09-20 RX ADMIN — NICOTINE POLACRILEX 2 MG: 2 GUM, CHEWING BUCCAL at 10:44

## 2023-09-20 RX ADMIN — OLANZAPINE 20 MG: 10 TABLET, ORALLY DISINTEGRATING ORAL at 21:23

## 2023-09-20 RX ADMIN — NICOTINE POLACRILEX 2 MG: 2 GUM, CHEWING BUCCAL at 19:58

## 2023-09-20 RX ADMIN — NICOTINE POLACRILEX 2 MG: 2 GUM, CHEWING BUCCAL at 16:22

## 2023-09-20 RX ADMIN — NICOTINE POLACRILEX 2 MG: 2 GUM, CHEWING BUCCAL at 06:18

## 2023-09-20 NOTE — PROGRESS NOTES
[facility]  Progress Note  Name: Georgette Bolaños  MRN: [id number]  Unit/Bed#: [address]  263-64  Date of Admission: 9/14/2023   Date of Service: 9/20/2023  Hospital Day: 6    Assessment/Plan   Pain, dental  Assessment & Plan  · Patient complaining of pain to right upper gingiva and left lower gingiva  · On evaluation, poor dentition overall. Many broken teeth and carries, gums are pink, no signs of infection  · Patient will need to follow up with dentist for extractions as an outpatient  · Will order anbesol for pain    09/20/23  · Patient reports no change in symptoms since yesterday. He states he "feels like something is getting inflamed." he denies pain. Reports sensitivity 3/10 with hot and cold  · On evaluation, there are no changes in physical exam findings since yesterday. Afebrile, no leukocytosis on CBC. · I informed patient that at this time it does not appear that he needs an antibiotic. I explained that if he experiences swelling, increase in pain, noticeable erythema or purulence to let nursing know for reevaluation. · Patient will need to follow up with dentist upon discharge  · Continue anbesol         '    Nurse Coordination of Care Discussion: Discussed assessment and plan with primary RN    Discussions with Specialists or Other Care Team Provider: None    Education and Discussions with Family / Patient: Answered patient's questions to the best of my ability    Time Spent for Care: 20 minutes. More than 50% of total time spent on counseling and coordination of care as described above. Current Length of Stay: 6 day(s)    Code Status: Level 1 - Full Code      Subjective:   Patient reports that he feels like his upper right gums are becoming inflamed. Is concerned for abscess and still questioning if he needs antibiotics. He denies pain or swelling. Admits to sensitivity with hot and cold.     Of note, patient is tangential. He begins to speak about his dental problems and jumps to speaking about food poisoning, juice being poisoned at the rehab he was at, bleach being put in his water. He is able to be redirected by quickly goes back to these topics. Objective:     Vitals:   Temp (24hrs), Av.3 °F (36.3 °C), Min:97.2 °F (36.2 °C), Max:97.3 °F (36.3 °C)    Temp:  [97.2 °F (36.2 °C)-97.3 °F (36.3 °C)] 97.3 °F (36.3 °C)  HR:  [69-76] 76  Resp:  [17-18] 17  BP: (112-130)/(70-83) 130/83  SpO2:  [99 %-100 %] 100 %  Body mass index is 22.98 kg/m². Physical Exam:     Physical Exam  Constitutional:       General: He is not in acute distress. Appearance: Normal appearance. He is not ill-appearing, toxic-appearing or diaphoretic. HENT:      Head: Normocephalic and atraumatic. Nose: Nose normal. No congestion or rhinorrhea. Mouth/Throat:      Mouth: Mucous membranes are moist.      Comments: Poor dentition. Multiple broken teeth and carries. Gums are pink, no erythema or pockets of purulence. No edema. Eyes:      General: No scleral icterus. Skin:     Coloration: Skin is not jaundiced. Neurological:      Mental Status: He is alert. Additional Data:     Labs:    Results from last 7 days   Lab Units 23  0650   WBC Thousand/uL 5.93   HEMOGLOBIN g/dL 14.9   HEMATOCRIT % 43.1   PLATELETS Thousands/uL 239   NEUTROS PCT % 49   LYMPHS PCT % 35   MONOS PCT % 10   EOS PCT % 5     Results from last 7 days   Lab Units 09/15/23  0639   SODIUM mmol/L 140   POTASSIUM mmol/L 4.2   CHLORIDE mmol/L 106   CO2 mmol/L 27   BUN mg/dL 13   CREATININE mg/dL 0.75   ANION GAP mmol/L 7   CALCIUM mg/dL 9.2   ALBUMIN g/dL 4.1   TOTAL BILIRUBIN mg/dL 0.49   ALK PHOS U/L 43   ALT U/L 13   AST U/L 26   GLUCOSE RANDOM mg/dL 84             Results from last 7 days   Lab Units 09/15/23  0638   HEMOGLOBIN A1C % 6.0*         * I Have Reviewed All Lab Data Listed Above. * Additional Pertinent Lab Tests Reviewed:  All Labs Within Last 24 Hours Reviewed    Imaging:    Imaging Reports Reviewed Today Include: No imaging reviewed today      Last 24 Hours Medication List:   Current Facility-Administered Medications   Medication Dose Route Frequency Provider Last Rate   • acetaminophen  650 mg Oral Q6H PRN Sacha Miller, DO     • acetaminophen  650 mg Oral Q4H PRN Sacha Milelr, DO     • acetaminophen  975 mg Oral Q6H PRN Sacha Miller, DO     • aluminum-magnesium hydroxide-simethicone  30 mL Oral Q4H PRN Sacha Miller, DO     • benzocaine   Mucosal 4x Daily PRN Fabienne Chávez PA-C     • benztropine  1 mg Intramuscular BID PRN Sacha Miller, DO     • benztropine  1 mg Oral BID PRN Sacha Miller, DO     • hydrOXYzine HCL  50 mg Oral Q6H PRN Max 4/day Sacha Miller DO      Or   • diphenhydrAMINE  50 mg Intramuscular Q6H PRN Sacha Miller, DO     • glycerin-hypromellose-  1 drop Both Eyes Q3H PRN Sacha Miller, DO     • hydrOXYzine HCL  100 mg Oral Q6H PRN Max 4/day Sacha Miller, DO      Or   • LORazepam  2 mg Intramuscular Q6H PRN Sacha Miller, DO     • hydrOXYzine HCL  25 mg Oral Q6H PRN Max 4/day Sacah Miller, DO     • melatonin  3 mg Oral HS PRN Sacha Miller, DO     • nicotine  1 patch Transdermal Daily Jen Villegas DO     • nicotine polacrilex  2 mg Oral Q2H PRN MICHELLE Kitchen     • OLANZapine  20 mg Oral HS Ulysses Spencer MD      Or   • OLANZapine  10 mg Intramuscular HS Ulysses Spencer MD     • OLANZapine  10 mg Oral Q3H PRN Max 3/day Sacha Miller, DO      Or   • OLANZapine  10 mg Intramuscular Q3H PRN Max 3/day Sacha Miller, DO     • OLANZapine  5 mg Oral Q3H PRN Max 6/day Sacha Miller, DO      Or   • OLANZapine  5 mg Intramuscular Q3H PRN Max 6/day Sacha Miller DO     • OLANZapine  2.5 mg Oral Q3H PRN Max 8/day Sacha Miller, DO     • polyethylene glycol  17 g Oral Daily PRN Sacha Miller, DO     • propranolol  10 mg Oral Q8H PRN Sacha Miller, DO     • senna-docusate sodium  1 tablet Oral Daily PRN Sacha Miller, DO          Today, Patient Was Seen By: Timoteo Wetzel PA-C      ** Please Note: Dictation voice to text software may have been used in the creation of this document.  **

## 2023-09-20 NOTE — PLAN OF CARE
Problem: Alteration in Thoughts and Perception  Goal: Treatment Goal: Gain control of psychotic behaviors/thinking, reduce/eliminate presenting symptoms and demonstrate improved reality functioning upon discharge  Outcome: Progressing  Goal: Verbalize thoughts and feelings  Description: Interventions:  - Promote a nonjudgmental and trusting relationship with the patient through active listening and therapeutic communication  - Assess patient's level of functioning, behavior and potential for risk  - Engage patient in 1 on 1 interactions  - Encourage patient to express fears, feelings, frustrations, and discuss symptoms    - [place] patient to reality, help patient recognize reality-based thinking   - Administer medications as ordered and assess for potential side effects  - Provide the patient education related to the signs and symptoms of the illness and desired effects of prescribed medications  Outcome: Progressing  Goal: Refrain from acting on delusional thinking/internal stimuli  Description: Interventions:  - Monitor patient closely, per order   - Utilize least restrictive measures   - Set reasonable limits, give positive feedback for acceptable   - Administer medications as ordered and monitor of potential side effects  Outcome: Progressing  Goal: Agree to be compliant with medication regime, as prescribed and report medication side effects  Description: Interventions:  - Offer appropriate PRN medication and supervise ingestion; conduct AIMS, as needed   Outcome: Progressing  Goal: Attend and participate in unit activities, including therapeutic, recreational, and educational groups  Description: Interventions:  -Encourage Visitation and family involvement in care  Outcome: Progressing  Goal: Recognize dysfunctional thoughts, communicate reality-based thoughts at the time of discharge  Description: Interventions:  - Provide medication and psycho-education to assist patient in compliance and developing insight into his/her illness   Outcome: Progressing  Goal: Complete daily ADLs, including personal hygiene independently, as able  Description: Interventions:  - Observe, teach, and assist patient with ADLS  - Monitor and promote a balance of rest/activity, with adequate nutrition and elimination   Outcome: Progressing     Problem: Ineffective Coping  Goal: Demonstrates healthy coping skills  Outcome: Progressing     Problem: DISCHARGE PLANNING  Goal: Discharge to home or other facility with appropriate resources  Description: INTERVENTIONS:  - Identify barriers to discharge w/patient and caregiver  - Arrange for needed discharge resources and transportation as appropriate  - Identify discharge learning needs (meds, wound care, etc.)  - Arrange for interpretive services to assist at discharge as needed  - Refer to Case Management Department for coordinating discharge planning if the patient needs post-hospital services based on physician/advanced practitioner order or complex needs related to functional status, cognitive ability, or social support system  Outcome: Progressing     Problem: PSYCHOSIS  Goal: Will report no hallucinations or delusions  Description: Interventions:  - Administer medication as  ordered  - Every waking shifts and PRN assess for the presence of hallucinations and or delusions  - Assist with reality testing to support increasing orientation  - Assess if patient's hallucinations or delusions are encouraging self-harm or harm to others and intervene as appropriate  Outcome: Progressing     Problem: INVOLUNTARY ADMIT  Goal: Will cooperate with staff recommendations and doctor's orders and will demonstrate appropriate behavior  Description: INTERVENTIONS:  - Treat underlying conditions and offer medication as ordered  - Educate regarding involuntary admission procedures and rules  - Utilize positive consistent limit setting strategies to support patient and staff safety  Outcome: Progressing Problem: Ineffective Coping  Goal: Participates in unit activities  Description: Interventions:  - Provide therapeutic environment   - Provide required programming   - Redirect inappropriate behaviors   Outcome: Not Progressing

## 2023-09-20 NOTE — PROGRESS NOTES
09/20/23 0843   Team Meeting   Meeting Type Daily Rounds   Team Members Present   Team Members Present Physician;Nurse;   Physician Team Member [id number] Team Member [facility] Team Member Maria Esther   Patient/Family Present   Patient Present No   Patient's Family Present No      303, pt remains disorganized -less paranoid. Compliant with meds and meals. Continue med  management -increase Zyprexa. Continue to monitor. Discharge to be determined.

## 2023-09-20 NOTE — CASE MANAGEMENT
Writer returned call to TOMÁS Schafer [phone] @ [facility]; left voicemail requesting a call back for care coordination. Write to follow up.

## 2023-09-20 NOTE — NURSING NOTE
Pt isolative to room this evening. Pleasant on approach and denied SI/HI and hallucinations on assessment. Denied need for PRN medications, states he sleeps well with medication he is prescribed. Came out for HS snack and stopped at med window for meds and took without difficulty before retiring to bed for night. Compliant with unit routines, meds, mouth check, and care.

## 2023-09-20 NOTE — ASSESSMENT & PLAN NOTE
· Patient complaining of pain to right upper gingiva and left lower gingiva  · On evaluation, poor dentition overall. Many broken teeth and carries, gums are pink, no signs of infection  · Patient will need to follow up with dentist for extractions as an outpatient  · Will order anbesol for pain    09/20/23  · Patient reports no change in symptoms since yesterday. He states he "feels like something is getting inflamed." he denies pain. Reports sensitivity 3/10 with hot and cold  · On evaluation, there are no changes in physical exam findings since yesterday. Afebrile, no leukocytosis on CBC. · I informed patient that at this time it does not appear that he needs an antibiotic. I explained that if he experiences swelling, increase in pain, noticeable erythema or purulence to let nursing know for reevaluation.   · Patient will need to follow up with dentist upon discharge  · Continue anbesol

## 2023-09-20 NOTE — NURSING NOTE
Pt is calm and cooperative on the unit. Pt is medication and meal compliant. Pt denies SI, HI, AH, and VH. Pt denies any needs at this time.
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
## 2023-09-20 NOTE — CASE MANAGEMENT
Writer called TOMÁS Wilcox [phone] @ [facility], left voicemail requesting a call back. Continue to follow up.

## 2023-09-21 PROCEDURE — 99232 SBSQ HOSP IP/OBS MODERATE 35: CPT | Performed by: PSYCHIATRY & NEUROLOGY

## 2023-09-21 RX ADMIN — NICOTINE 7 MG: 7 PATCH, EXTENDED RELEASE TRANSDERMAL at 16:44

## 2023-09-21 RX ADMIN — NICOTINE POLACRILEX 2 MG: 2 GUM, CHEWING BUCCAL at 12:17

## 2023-09-21 RX ADMIN — OLANZAPINE 20 MG: 10 TABLET, ORALLY DISINTEGRATING ORAL at 21:11

## 2023-09-21 RX ADMIN — NICOTINE POLACRILEX 2 MG: 2 GUM, CHEWING BUCCAL at 10:21

## 2023-09-21 NOTE — PROGRESS NOTES
Progress Note - Humberto Krishna 43 y.o. male MRN: [id number]  Unit/Bed#: -02 Encounter: [id number]    Assessment/Plan   Principal Problem:    Bipolar I disorder, most recent episode manic, severe with psychotic features ([address])  Active Problems:    Medical clearance for psychiatric admission    Tobacco abuse    Pain, dental      Recommended Treatment:   No psychopharmacologic changes necessary at this moment; will continue to assess daily for further optimization. Continue with pharmacotherapy, group therapy, milieu therapy and occupational therapy. Continue to assess for adverse medication side effects. Encourage Maddie Grossman to participate in nonverbal forms of therapy including journaling and art/music therapy. Continue frequent safety checks and vitals per unit protocol. Continue to engage CM/SW to assist with collateral, disposition planning, and the implementation of an individualized, patient-centered plan of care. Continue medical management by medical team.  Case discussed with treatment team.    Legal Status: 303  ------------------------------------------------------------    Subjective: All documentation including nursing notes, medication history to ensure medication adherence on the unit, labs, and vitals were reviewed. Heather Bangura was evaluated this morning for continuity of care and no acute distress noted throughout the evaluation. Over the past 24 hours per nursing report, Heather Bangura has been cooperative on the unit and compliant with medications. Patient reported to JUANITA Lowe "is working so well I may give up my medical marijuana card. .. I'm not having the nervous breakdowns I was having". Today, Heather Bangura is consenting for safety on the unit. Heather Bangura reports feeling "good." Heather Bangura notes having decreased sleep due to "tossing and turning". He estimates a total of 3 hours of sleep overnight with a 1 hour nap earlier yesterday.   He is unable to attribute any specific causes but does mention roommate snoring, having 3 servings of ice cream yesterday and increased sugar intake as well as sodas. Patient was educated on metabolic side effects of Zyprexa and weight gain and encouraged to make healthier food choices. He does note feeling "hunger" as a side effect but also attributes it possibly to not using his tobacco/nicotine products on the unit. Ghazala Shea states having an increased appetite. Ghazala Shea has been taking the medications as prescribed and reporting no other side effects than "hunger". Patient reports he does not want addition of a mood stabilizer at this time and would like to hold off on any adjustments to Zyprexa at this time. Ghazala Shea denies suicidal ideations. Ghazala Shea denies homicidal ideations. Regarding hallucinations, Ghazala Shea denies AVH but appears internally preoccupied. He continues to have delusions and paranoia regarding beverages, suspiciousness regarding medication and preoccupation with dental abscess. He remains with poor insight stating he is not psychotic and only has occasional "nervous breakdowns". No swelling, erythema or CN7 deficits noted. Patient reported he will try to see if motrin would help with dental discomfort but does not feel the Anbesol is helping. PRNs overnight: nicotine   VS: Reviewed, within normal limits    Progress Toward Goals: slow improvement    Psychiatric Review of Systems:  Behavior over the last 24 hours: Improving slowly  Sleep: decreased, slept off and on  Appetite: increased  Medication side effects: Yes - hunger   ROS: reports dental discomfort, denies dental pain, denies any chest pain, all other systems are negative    Vital signs in last 24 hours:  Temp:  [97.2 °F (36.2 °C)-97.9 °F (36.6 °C)] 97.9 °F (36.6 °C)  HR:  [62-73] 62  Resp:  [16] 16  BP: (116-117)/(61-69) 117/69    Laboratory results:  I have personally reviewed all pertinent laboratory/tests results.   Recent Results (from the past 48 hour(s))   CBC and differential    Collection Time: 09/20/23  6:50 AM   Result Value Ref Range    WBC 5.93 4.31 - 10.16 Thousand/uL    RBC 4.98 3.88 - 5.62 Million/uL    Hemoglobin 14.9 12.0 - 17.0 g/dL    Hematocrit 43.1 36.5 - 49.3 %    MCV 87 82 - 98 fL    MCH 29.9 26.8 - 34.3 pg    MCHC 34.6 31.4 - 37.4 g/dL    RDW 12.7 11.6 - 15.1 %    MPV 10.7 8.9 - 12.7 fL    Platelets 251 361 - 525 Thousands/uL    nRBC 0 /100 WBCs    Neutrophils Relative 49 43 - 75 %    Immat GRANS % 0 0 - 2 %    Lymphocytes Relative 35 14 - 44 %    Monocytes Relative 10 4 - 12 %    Eosinophils Relative 5 0 - 6 %    Basophils Relative 1 0 - 1 %    Neutrophils Absolute 2.96 1.85 - 7.62 Thousands/µL    Immature Grans Absolute 0.01 0.00 - 0.20 Thousand/uL    Lymphocytes Absolute 2.05 0.60 - 4.47 Thousands/µL    Monocytes Absolute 0.59 0.17 - 1.22 Thousand/µL    Eosinophils Absolute 0.28 0.00 - 0.61 Thousand/µL    Basophils Absolute 0.04 0.00 - 0.10 Thousands/µL         Mental Status Evaluation:    Appearance:  age appropriate, casually dressed, looks stated age   Behavior:  pleasant, cooperative, calm   Speech:  normal rate and volume, Talkative   Mood:  "Good"   Affect:  constricted and more reactive   Thought Process:  Less circumstantial, less racing of thoughts   Associations: circumstantial associations   Thought Content:  some paranoia, preoccupied with Beverages and dental abscess, less grandiose   Perceptual Disturbances: Denies auditory or visual hallucinations; appears less distracted   Risk Potential: Suicidal ideation - None at present  Homicidal ideation - None at present  Potential for aggression - Yes, due to poor impulse control   Sensorium:  oriented to person and situation   Memory:  recent and remote memory grossly intact   Consciousness:  alert and awake   Attention/Concentration: attention span and concentration are improving   Insight:  poor   Judgment: limited   Gait/Station: normal gait/station, normal balance   Motor Activity: no abnormal movements       Current Medications:  Current Facility-Administered Medications   Medication Dose Route Frequency Provider Last Rate   • acetaminophen  650 mg Oral Q6H PRN Jono Cranker, DO     • acetaminophen  650 mg Oral Q4H PRN Jono Cranker, DO     • acetaminophen  975 mg Oral Q6H PRN Venango Cranker, DO     • aluminum-magnesium hydroxide-simethicone  30 mL Oral Q4H PRN Venango Cranker, DO     • benzocaine   Mucosal 4x Daily PRN Jose MondayPENNY     • benztropine  1 mg Intramuscular BID PRN Venango Cranker, DO     • benztropine  1 mg Oral BID PRN Jono Cranker, DO     • hydrOXYzine HCL  50 mg Oral Q6H PRN Max 4/day Jono Cranker, DO      Or   • diphenhydrAMINE  50 mg Intramuscular Q6H PRN Jono Cranker, DO     • glycerin-hypromellose-  1 drop Both Eyes Q3H PRN Jono Cranker, DO     • hydrOXYzine HCL  100 mg Oral Q6H PRN Max 4/day Jono Cranker, DO      Or   • LORazepam  2 mg Intramuscular Q6H PRN Jono Cranker, DO     • hydrOXYzine HCL  25 mg Oral Q6H PRN Max 4/day Jono Cranker, DO     • melatonin  3 mg Oral HS PRN Jono Cranker, DO     • nicotine  1 patch Transdermal Daily Jen Villegas DO     • nicotine polacrilex  2 mg Oral Q2H PRN MICHELLE Kitchen     • OLANZapine  20 mg Oral HS Tristian Dumas MD      Or   • OLANZapine  10 mg Intramuscular HS Tristian Dumas MD     • OLANZapine  10 mg Oral Q3H PRN Max 3/day Jono Cranker, DO      Or   • OLANZapine  10 mg Intramuscular Q3H PRN Max 3/day Jono Cranker, DO     • OLANZapine  5 mg Oral Q3H PRN Max 6/day Jono Cranker, DO      Or   • OLANZapine  5 mg Intramuscular Q3H PRN Max 6/day Venango Cranker, DO     • OLANZapine  2.5 mg Oral Q3H PRN Max 8/day Jono Cranker, DO     • polyethylene glycol  17 g Oral Daily PRN Venango Cranker, DO     • propranolol  10 mg Oral Q8H PRN Jono Cranker, DO     • senna-docusate sodium  1 tablet Oral Daily PRN Jono Cranker, DO       The following interventions are recommended: behavioral checks every 7 minutes, continued hospitalization on locked unit    Behavioral Health Medications: All current active meds have been reviewed. Changes as in plan section above. Risks, benefits and possible side effects of Medications:   Risks, benefits, and possible side effects of medications explained to patient and patient verbalizes understanding. Counseling / Coordination of Care:  Patient's progress discussed with staff in treatment team meeting. Medications, treatment progress and treatment plan reviewed with patient. Murtaza Cedeno DO 09/21/23  Psychiatry Resident, PGY-II    This note was completed in part utilizing Dragon dictation Software. Grammatical, translation, syntax errors, random word insertions, spelling mistakes, and incomplete sentences may be an occasional consequence of this system secondary to software limitations with voice recognition, ambient noise, and hardware issues. If you have any questions or concerns about the content, text, or information contained within the body of this dictation, please contact the provider for clarification.

## 2023-09-21 NOTE — PROGRESS NOTES
09/21/23 1854   Team Meeting   Meeting Type Daily Rounds   Team Members Present   Team Members Present Physician;Nurse;   Physician Team Member [id number] Team Member JORGE WILDE [facility] Management Team Member Maria Esther   Patient/Family Present   Patient Present No   Patient's Family Present No     303, Pt disorganized -less paranoid and labile. Compliant with meds and meals. Continue Zyprexa. Pt was compliant with [employer] and completed application yesterday. Continue to monitor.

## 2023-09-21 NOTE — NURSING NOTE
Pt is visible on the unit and attends groups. Pt is pleasant and cooperative upon approach. Pt is preoccupied about mouth pain but is denying medication for it. Pt denies SI, HI, AH, and VH. Pt is meal compliant. Pt denies any needs at this time.

## 2023-09-21 NOTE — PLAN OF CARE
Problem: Alteration in Thoughts and Perception  Goal: Treatment Goal: Gain control of psychotic behaviors/thinking, reduce/eliminate presenting symptoms and demonstrate improved reality functioning upon discharge  Outcome: Progressing  Goal: Verbalize thoughts and feelings  Description: Interventions:  - Promote a nonjudgmental and trusting relationship with the patient through active listening and therapeutic communication  - Assess patient's level of functioning, behavior and potential for risk  - Engage patient in 1 on 1 interactions  - Encourage patient to express fears, feelings, frustrations, and discuss symptoms    - [place] patient to reality, help patient recognize reality-based thinking   - Administer medications as ordered and assess for potential side effects  - Provide the patient education related to the signs and symptoms of the illness and desired effects of prescribed medications  Outcome: Progressing  Goal: Refrain from acting on delusional thinking/internal stimuli  Description: Interventions:  - Monitor patient closely, per order   - Utilize least restrictive measures   - Set reasonable limits, give positive feedback for acceptable   - Administer medications as ordered and monitor of potential side effects  Outcome: Progressing  Goal: Complete daily ADLs, including personal hygiene independently, as able  Description: Interventions:  - Observe, teach, and assist patient with ADLS  - Monitor and promote a balance of rest/activity, with adequate nutrition and elimination   Outcome: Progressing     Problem: Ineffective Coping  Goal: Participates in unit activities  Description: Interventions:  - Provide therapeutic environment   - Provide required programming   - Redirect inappropriate behaviors   Outcome: Progressing     Problem: PSYCHOSIS  Goal: Will report no hallucinations or delusions  Description: Interventions:  - Administer medication as  ordered  - Every waking shifts and PRN assess for the presence of hallucinations and or delusions  - Assist with reality testing to support increasing orientation  - Assess if patient's hallucinations or delusions are encouraging self-harm or harm to others and intervene as appropriate  Outcome: Progressing     Problem: INVOLUNTARY ADMIT  Goal: Will cooperate with staff recommendations and doctor's orders and will demonstrate appropriate behavior  Description: INTERVENTIONS:  - Treat underlying conditions and offer medication as ordered  - Educate regarding involuntary admission procedures and rules  - Utilize positive consistent limit setting strategies to support patient and staff safety  Outcome: Progressing

## 2023-09-21 NOTE — NURSING NOTE
Pt about unit and approached RN in med room for nicotine gum, given. Asked if it was too early for Zyprexa and pt stated he believes it "is working so well I may give up my medical marijuana card. .I'm not having the nervous breakdowns I was having." Pt expressed interest in continuing medication and treatment after discharge. Denies SI, HI and hallucinations. Calm and socializing on unit at this time. Compliant with unit routines and care.

## 2023-09-22 PROCEDURE — 99232 SBSQ HOSP IP/OBS MODERATE 35: CPT | Performed by: PSYCHIATRY & NEUROLOGY

## 2023-09-22 RX ADMIN — NICOTINE POLACRILEX 2 MG: 2 GUM, CHEWING BUCCAL at 19:26

## 2023-09-22 RX ADMIN — OLANZAPINE 20 MG: 10 TABLET, ORALLY DISINTEGRATING ORAL at 21:11

## 2023-09-22 RX ADMIN — MELATONIN TAB 3 MG 3 MG: 3 TAB at 02:41

## 2023-09-22 RX ADMIN — NICOTINE POLACRILEX 2 MG: 2 GUM, CHEWING BUCCAL at 13:54

## 2023-09-22 RX ADMIN — NICOTINE POLACRILEX 2 MG: 2 GUM, CHEWING BUCCAL at 21:12

## 2023-09-22 NOTE — PROGRESS NOTES
Progress Note - Humberto Krishna 43 y.o. male MRN: [id number]  Unit/Bed#: -02 Encounter: [id number]    Assessment/Plan   Principal Problem:    Bipolar I disorder, most recent episode manic, severe with psychotic features ([address])  Active Problems:    Medical clearance for psychiatric admission    Tobacco abuse    Pain, dental      Recommended Treatment:   No psychopharmacologic changes necessary at this moment; will continue to assess daily for further optimization. Patient is currently on Probation. Per CM, plan is to discharge patient to D&A per probation guidelines. Continue with pharmacotherapy, group therapy, milieu therapy and occupational therapy. Continue to assess for adverse medication side effects. Encourage Hebert Grullon to participate in nonverbal forms of therapy including journaling and art/music therapy. Continue frequent safety checks and vitals per unit protocol. Continue to engage CM/SW to assist with collateral, disposition planning, and the implementation of an individualized, patient-centered plan of care. Continue medical management by medical team.  Case discussed with treatment team.    Legal Status: [id number]  ------------------------------------------------------------    Subjective: All documentation including nursing notes, medication history to ensure medication adherence on the unit, labs, and vitals were reviewed. Georges Raymond was evaluated this morning for continuity of care and no acute distress noted throughout the evaluation. Over the past 24 hours per nursing report, Georges Raymond has been cooperative on the unit and compliant with medications. Patient is preoccupied with mouth pain at times but refused any pain medications for it. Today, Georges Raymond is consenting for safety on the unit. Georges Raymond reports feeling "bummed", "I'm okay", "annoyed".  Georges Raymond notes having decreased sleep for the past two nights but is not agreeable to trazodone at this time, preferring "all natural" melatonin. He is upset regarding news of discharge plans, reporting he is fine with D&A services after [facility] stay but does not want to stay at a recovery house or half-way house as it is "my money, my choice" and he is "a loner" who does not do well with groups. "groups, groups, groups. .. I hate groups". He is pacing his room, increased rate of speech, talkative and at times tangential but remains in behavioral control with irritable edge, though overall pleasant with this writer. He makes grandiose statements about his  being younger than him and being "too strict". He does continue to make delusional statements regarding being previously poisoned with bleach. Patient at one point opens tube of toothpaste and puts a small amount in his mouth directly from tube and has it in his mouth?** during conversation? Ricardo Mcclendon states having an adequate appetite. Ricardo Mcclendon has been taking the medications as prescribed and reporting no side effects. Ricardo Sly denies suicidal ideations. [place] Sly denies homicidal ideations. Regarding hallucinations, Ricardo Mcclendon denies AVH, though appears distracted with minimal eye contact during interaction. PRNs overnight: melatonin    VS: Reviewed, T80.4 F , otherwise within normal limits    Progress Toward Goals: slow improvement    Psychiatric Review of Systems:  Behavior over the last 24 hours:  unchanged  Sleep: decreased  Appetite: normal  Medication side effects: No   ROS: denies any shortness of breath or chest pain, all other systems are negative    Vital signs in last 24 hours:  Temp:  [97.3 °F (36.3 °C)-98.4 °F (36.9 °C)] 97.3 °F (36.3 °C)  HR:  [73-76] 76  Resp:  [16] 16  BP: (115-117)/(61-67) 117/61    Laboratory results:  I have personally reviewed all pertinent laboratory/tests results. No results found for this or any previous visit (from the past 48 hour(s)).       Mental Status Evaluation:    Appearance:  age appropriate, casually dressed, looks stated age   Behavior:  cooperative, irritable edge, pacing the room   Speech:  normal volume, increased rate, talkative   Mood:  "bummed". .. "I'm okay", "annoyed"   Affect:  constricted, irritable   Thought Process:   Tangential at times, increased rate of thoughts, perseverative   Associations: tangential associations   Thought Content:  paranoid ideation, grandiose ideas, negative thinking   Perceptual Disturbances: Denies auditory or visual hallucinations, appears distracted   Risk Potential: Suicidal ideation - None at present  Homicidal ideation - None at present  Potential for aggression - Yes, due to poor impulse control   Sensorium:  oriented to person, situation   Memory:  recent and remote memory grossly intact   Consciousness:  alert and awake   Attention/Concentration: attention span and concentration are improving   Insight:  poor   Judgment: limited   Gait/Station: normal gait/station, normal balance   Motor Activity: no abnormal movements       Current Medications:  Current Facility-Administered Medications   Medication Dose Route Frequency Provider Last Rate   • acetaminophen  650 mg Oral Q6H PRN Eugenio Folds, DO     • acetaminophen  650 mg Oral Q4H PRN Eugenio Folds, DO     • acetaminophen  975 mg Oral Q6H PRN Eugenio Folds, DO     • aluminum-magnesium hydroxide-simethicone  30 mL Oral Q4H PRN Eugenio Folds, DO     • benzocaine   Mucosal 4x Daily PRN Jarrell Ruiz PA-C     • benztropine  1 mg Intramuscular BID PRN Eugenio Folds, DO     • benztropine  1 mg Oral BID PRN Eugenio Folds, DO     • hydrOXYzine HCL  50 mg Oral Q6H PRN Max 4/day Eugenio Folds, DO      Or   • diphenhydrAMINE  50 mg Intramuscular Q6H PRN Eugenio Folds, DO     • glycerin-hypromellose-  1 drop Both Eyes Q3H PRN Eugenio Folds, DO     • hydrOXYzine HCL  100 mg Oral Q6H PRN Max 4/day Eugenio Folds, DO      Or   • LORazepam  2 mg Intramuscular Q6H PRN Eugenio Folds, DO     • hydrOXYzine HCL  25 mg Oral Q6H PRN Max 4/day Jono Cranker, DO     • melatonin  3 mg Oral HS PRN [place] Cranker, DO     • nicotine  7 mg Transdermal Daily Jose MondayPENNY     • nicotine polacrilex  2 mg Oral Q2H PRN MICHELLE Kitchen     • OLANZapine  20 mg Oral HS Tristian Dumas MD      Or   • OLANZapine  10 mg Intramuscular HS Tristian Dumas MD     • OLANZapine  10 mg Oral Q3H PRN Max 3/day Jono Cranker, DO      Or   • OLANZapine  10 mg Intramuscular Q3H PRN Max 3/day [place] Cranker, DO     • OLANZapine  5 mg Oral Q3H PRN Max 6/day Jono Cranker, DO      Or   • OLANZapine  5 mg Intramuscular Q3H PRN Max 6/day [place] Cranker, DO     • OLANZapine  2.5 mg Oral Q3H PRN Max 8/day [place] Cranker, DO     • polyethylene glycol  17 g Oral Daily PRN [place] Cranker, DO     • propranolol  10 mg Oral Q8H PRN Jono Cranker, DO     • senna-docusate sodium  1 tablet Oral Daily PRN [place] Cranker, DO       The following interventions are recommended: behavioral checks every 7 minutes, continued hospitalization on locked unit    Behavioral Health Medications: All current active meds have been reviewed. Changes as in plan section above. Risks, benefits and possible side effects of Medications:   Risks, benefits, and possible side effects of medications explained to patient and patient verbalizes understanding. Counseling / Coordination of Care:  Patient's progress discussed with staff in treatment team meeting. Medications, treatment progress and treatment plan reviewed with patient. Wilbur Cranker, DO 09/22/23  Psychiatry Resident, PGY-II    This note was completed in part utilizing Dragon dictation Software. Grammatical, translation, syntax errors, random word insertions, spelling mistakes, and incomplete sentences may be an occasional consequence of this system secondary to software limitations with voice recognition, ambient noise, and hardware issues.  If you have any questions or concerns about the content, text, or information contained within the body of this dictation, please contact the provider for clarification.

## 2023-09-22 NOTE — CASE MANAGEMENT
Phone communication with patient's p.o. John Paul Coley at [facility] Adult probation phone: [phone]. Writer provided status update. Chrissy Gutierrez noted that pt is on probation until November 2023 and is imperative that pt must complete inpatient D/A rehab as condition of his probation. Chrissy Gutierrez requested to be involved in patient discharge planning. Contacts were exchanged. Continue to coordinate.

## 2023-09-22 NOTE — NURSING NOTE
Patient is calm and cooperative upon approach. Patient is visible on milieu. Patient denies SI/HI/AH/VH. Q 7 mins safety checks continued.

## 2023-09-22 NOTE — NURSING NOTE
Pt visible on unit and pleasant on approach. Denies SI, HI and hallucinations. Denies anxiety and has had no c/o tooth pain. Pt compliant with unit routines and care. Safety checks continue.

## 2023-09-23 PROCEDURE — 99232 SBSQ HOSP IP/OBS MODERATE 35: CPT | Performed by: HOSPITALIST

## 2023-09-23 RX ADMIN — OLANZAPINE 20 MG: 10 TABLET, ORALLY DISINTEGRATING ORAL at 21:17

## 2023-09-23 RX ADMIN — NICOTINE POLACRILEX 2 MG: 2 GUM, CHEWING BUCCAL at 15:33

## 2023-09-23 NOTE — PLAN OF CARE
Problem: Alteration in Thoughts and Perception  Goal: Treatment Goal: Gain control of psychotic behaviors/thinking, reduce/eliminate presenting symptoms and demonstrate improved reality functioning upon discharge  Outcome: Progressing  Goal: Refrain from acting on delusional thinking/internal stimuli  Description: Interventions:  - Monitor patient closely, per order   - Utilize least restrictive measures   - Set reasonable limits, give positive feedback for acceptable   - Administer medications as ordered and monitor of potential side effects  Outcome: Progressing  Goal: Complete daily ADLs, including personal hygiene independently, as able  Description: Interventions:  - Observe, teach, and assist patient with ADLS  - Monitor and promote a balance of rest/activity, with adequate nutrition and elimination   Outcome: Progressing     Problem: Ineffective Coping  Goal: Participates in unit activities  Description: Interventions:  - Provide therapeutic environment   - Provide required programming   - Redirect inappropriate behaviors   Outcome: Progressing     Problem: PSYCHOSIS  Goal: Will report no hallucinations or delusions  Description: Interventions:  - Administer medication as  ordered  - Every waking shifts and PRN assess for the presence of hallucinations and or delusions  - Assist with reality testing to support increasing orientation  - Assess if patient's hallucinations or delusions are encouraging self-harm or harm to others and intervene as appropriate  Outcome: Progressing     Problem: INVOLUNTARY ADMIT  Goal: Will cooperate with staff recommendations and doctor's orders and will demonstrate appropriate behavior  Description: INTERVENTIONS:  - Treat underlying conditions and offer medication as ordered  - Educate regarding involuntary admission procedures and rules  - Utilize positive consistent limit setting strategies to support patient and staff safety  Outcome: Progressing

## 2023-09-23 NOTE — PROGRESS NOTES
Progress Note - [facility] 43 y.o. male MRN: [id number]   Unit/Bed#: [facility] 382-02 Encounter: [id number]    Behavior over the last 24 hours: unchanged. Georges Raymond seen today, per staff report has been guarded on the unit. On exam today patient minimizes symptoms and at incidents leading to hospitalization. When challenged regarding his Jain preoccupation he states this was due to "juice poisoning" at the facility he was prior to coming to the hospital.  Explains the juice machine had not been cleaned and was old therefore made him sick. Patient states he does not feel he has a mental illness, does not feel medications are helping him and openly states he does not plan to take medication upon discharge. Remains paranoid, suspicious and with poor insight and judgment        Mental Status Evaluation:    Appearance:  casually dressed, adequate grooming, looks stated age   Behavior:  guarded   Speech:  normal volume, increased rate   Mood:  mildly irritable   Affect:  inappropriate, labile   Thought Process:  illogical, circumstantial   Associations: circumstantial associations   Thought Content:  paranoid ideation   Perceptual Disturbances: denies auditory hallucinations when asked   Risk Potential: Suicidal ideation - None  Homicidal ideation - None   Sensorium:  oriented to person, place and time/date   Memory:  recent and remote memory grossly intact   Consciousness:  alert and awake   Attention: attention span and concentration are age appropriate   Insight:  impaired   Judgment: impaired   Gait/Station: normal gait/station   Motor Activity: no abnormal movements     Vital signs in last 24 hours:    Temp:  [97.5 °F (36.4 °C)-97.6 °F (36.4 °C)] 97.6 °F (36.4 °C)  HR:  [61-70] 70  Resp:  [16] 16  BP: (117-119)/(63-67) 119/67    Laboratory results: I have personally reviewed all pertinent laboratory/tests results.         Assessment/Plan   Principal Problem:    Bipolar I disorder, most recent episode manic, severe with psychotic features ([address])  Active Problems:    Medical clearance for psychiatric admission    Tobacco abuse    Pain, dental    Recommended Treatment:     Planned medication and treatment changes:  Continue Zyprexa 20 mg at bedtime    All current active medications have been reviewed  Encourage group therapy, milieu therapy and occupational therapy  Behavioral Health checks every 7 minutes  Current Facility-Administered Medications   Medication Dose Route Frequency Provider Last Rate   • acetaminophen  650 mg Oral Q6H PRN Rico Dynes, DO     • acetaminophen  650 mg Oral Q4H PRN Rico Dynes, DO     • acetaminophen  975 mg Oral Q6H PRN Rico Dynes, DO     • aluminum-magnesium hydroxide-simethicone  30 mL Oral Q4H PRN Rico Dynes, DO     • benzocaine   Mucosal 4x Daily PRN Navneet Valencia PA-C     • benztropine  1 mg Intramuscular BID PRN Rico Dynes, DO     • benztropine  1 mg Oral BID PRN Rico Dynes, DO     • hydrOXYzine HCL  50 mg Oral Q6H PRN Max 4/day Rico Dynes, DO      Or   • diphenhydrAMINE  50 mg Intramuscular Q6H PRN Rico Dynes, DO     • glycerin-hypromellose-  1 drop Both Eyes Q3H PRN Rico Dynes, DO     • hydrOXYzine HCL  100 mg Oral Q6H PRN Max 4/day Rico Dynes, DO      Or   • LORazepam  2 mg Intramuscular Q6H PRN Rico Dynes, DO     • hydrOXYzine HCL  25 mg Oral Q6H PRN Max 4/day Rico Dynes, DO     • melatonin  3 mg Oral HS PRN Rico Dynes, DO     • nicotine  7 mg Transdermal Daily Navneet Valencia PA-C     • nicotine polacrilex  2 mg Oral Q2H PRN MICHELLE Kitchen     • OLANZapine  20 mg Oral HS Jennifer Castorena MD      Or   • OLANZapine  10 mg Intramuscular HS Jennifer Castorena MD     • OLANZapine  10 mg Oral Q3H PRN Max 3/day Rico Dynes, DO      Or   • OLANZapine  10 mg Intramuscular Q3H PRN Max 3/day Rico Dynes, DO     • OLANZapine  5 mg Oral Q3H PRN Max 6/day Rico Dynes, DO      Or   • OLANZapine  5 mg Intramuscular Q3H PRN Max 6/day Monster Begun, DO     • OLANZapine  2.5 mg Oral Q3H PRN Max 8/day Monster Begun, DO     • polyethylene glycol  17 g Oral Daily PRN Monster Begun, DO     • propranolol  10 mg Oral Q8H PRN Monster Begun, DO     • senna-docusate sodium  1 tablet Oral Daily PRN Monster Begun, DO         Risks / Benefits of Treatment:    Risks, benefits, and possible side effects of medications explained to patient and patient verbalizes understanding and agreement for treatment. Counseling / Coordination of Care: Total floor / unit time spent today 35 minutes. Greater than 50% of total time was spent with the patient and / or family counseling and / or coordination of care. A description of counseling / coordination of care:  Patient's progress discussed with staff in treatment team meeting. Medications, treatment progress and treatment plan reviewed with patient.     Manjit Manley MD 09/23/23

## 2023-09-23 NOTE — NURSING NOTE
Denies SI/HI/SIB/AVH/Pain. Calm and in control. Cooperative. Visible and social. Wearing own clothes. Offers no complaints.

## 2023-09-23 NOTE — NURSING NOTE
Pt visible and social. Pleasant on approach. Denies si.hi. avh. denies unmet needs.  Continued q7m checks for safety     Compliant with evening meds

## 2023-09-23 NOTE — PLAN OF CARE
Problem: Alteration in Thoughts and Perception  Goal: Treatment Goal: Gain control of psychotic behaviors/thinking, reduce/eliminate presenting symptoms and demonstrate improved reality functioning upon discharge  Outcome: Progressing  Goal: Verbalize thoughts and feelings  Description: Interventions:  - Promote a nonjudgmental and trusting relationship with the patient through active listening and therapeutic communication  - Assess patient's level of functioning, behavior and potential for risk  - Engage patient in 1 on 1 interactions  - Encourage patient to express fears, feelings, frustrations, and discuss symptoms    - [place] patient to reality, help patient recognize reality-based thinking   - Administer medications as ordered and assess for potential side effects  - Provide the patient education related to the signs and symptoms of the illness and desired effects of prescribed medications  Outcome: Progressing  Goal: Refrain from acting on delusional thinking/internal stimuli  Description: Interventions:  - Monitor patient closely, per order   - Utilize least restrictive measures   - Set reasonable limits, give positive feedback for acceptable   - Administer medications as ordered and monitor of potential side effects  Outcome: Progressing  Goal: Agree to be compliant with medication regime, as prescribed and report medication side effects  Description: Interventions:  - Offer appropriate PRN medication and supervise ingestion; conduct AIMS, as needed   Outcome: Progressing  Goal: Recognize dysfunctional thoughts, communicate reality-based thoughts at the time of discharge  Description: Interventions:  - Provide medication and psycho-education to assist patient in compliance and developing insight into his/her illness   Outcome: Progressing  Goal: Complete daily ADLs, including personal hygiene independently, as able  Description: Interventions:  - Observe, teach, and assist patient with ADLS  - Monitor and promote a balance of rest/activity, with adequate nutrition and elimination   Outcome: Progressing     Problem: Ineffective Coping  Goal: Demonstrates healthy coping skills  Outcome: Progressing     Problem: DISCHARGE PLANNING  Goal: Discharge to home or other facility with appropriate resources  Description: INTERVENTIONS:  - Identify barriers to discharge w/patient and caregiver  - Arrange for needed discharge resources and transportation as appropriate  - Identify discharge learning needs (meds, wound care, etc.)  - Arrange for interpretive services to assist at discharge as needed  - Refer to Case Management Department for coordinating discharge planning if the patient needs post-hospital services based on physician/advanced practitioner order or complex needs related to functional status, cognitive ability, or social support system  Outcome: Progressing     Problem: PSYCHOSIS  Goal: Will report no hallucinations or delusions  Description: Interventions:  - Administer medication as  ordered  - Every waking shifts and PRN assess for the presence of hallucinations and or delusions  - Assist with reality testing to support increasing orientation  - Assess if patient's hallucinations or delusions are encouraging self-harm or harm to others and intervene as appropriate  Outcome: Progressing     Problem: INVOLUNTARY ADMIT  Goal: Will cooperate with staff recommendations and doctor's orders and will demonstrate appropriate behavior  Description: INTERVENTIONS:  - Treat underlying conditions and offer medication as ordered  - Educate regarding involuntary admission procedures and rules  - Utilize positive consistent limit setting strategies to support patient and staff safety  Outcome: Progressing

## 2023-09-24 PROCEDURE — 99232 SBSQ HOSP IP/OBS MODERATE 35: CPT | Performed by: HOSPITALIST

## 2023-09-24 RX ADMIN — NICOTINE POLACRILEX 2 MG: 2 GUM, CHEWING BUCCAL at 18:57

## 2023-09-24 RX ADMIN — NICOTINE POLACRILEX 2 MG: 2 GUM, CHEWING BUCCAL at 21:14

## 2023-09-24 RX ADMIN — OLANZAPINE 20 MG: 10 TABLET, ORALLY DISINTEGRATING ORAL at 21:14

## 2023-09-24 NOTE — PLAN OF CARE
Problem: Alteration in Thoughts and Perception  Goal: Treatment Goal: Gain control of psychotic behaviors/thinking, reduce/eliminate presenting symptoms and demonstrate improved reality functioning upon discharge  Outcome: Progressing  Goal: Verbalize thoughts and feelings  Description: Interventions:  - Promote a nonjudgmental and trusting relationship with the patient through active listening and therapeutic communication  - Assess patient's level of functioning, behavior and potential for risk  - Engage patient in 1 on 1 interactions  - Encourage patient to express fears, feelings, frustrations, and discuss symptoms    - [place] patient to reality, help patient recognize reality-based thinking   - Administer medications as ordered and assess for potential side effects  - Provide the patient education related to the signs and symptoms of the illness and desired effects of prescribed medications  Outcome: Progressing  Goal: Refrain from acting on delusional thinking/internal stimuli  Description: Interventions:  - Monitor patient closely, per order   - Utilize least restrictive measures   - Set reasonable limits, give positive feedback for acceptable   - Administer medications as ordered and monitor of potential side effects  Outcome: Progressing  Goal: Agree to be compliant with medication regime, as prescribed and report medication side effects  Description: Interventions:  - Offer appropriate PRN medication and supervise ingestion; conduct AIMS, as needed   Outcome: Progressing  Goal: Attend and participate in unit activities, including therapeutic, recreational, and educational groups  Description: Interventions:  -Encourage Visitation and family involvement in care  Outcome: Not Progressing  Goal: Recognize dysfunctional thoughts, communicate reality-based thoughts at the time of discharge  Description: Interventions:  - Provide medication and psycho-education to assist patient in compliance and developing insight into his/her illness   Outcome: Progressing  Goal: Complete daily ADLs, including personal hygiene independently, as able  Description: Interventions:  - Observe, teach, and assist patient with ADLS  - Monitor and promote a balance of rest/activity, with adequate nutrition and elimination   Outcome: Progressing     Problem: Ineffective Coping  Goal: Demonstrates healthy coping skills  Outcome: Progressing  Goal: Participates in unit activities  Description: Interventions:  - Provide therapeutic environment   - Provide required programming   - Redirect inappropriate behaviors   Outcome: Not Progressing     Problem: PSYCHOSIS  Goal: Will report no hallucinations or delusions  Description: Interventions:  - Administer medication as  ordered  - Every waking shifts and PRN assess for the presence of hallucinations and or delusions  - Assist with reality testing to support increasing orientation  - Assess if patient's hallucinations or delusions are encouraging self-harm or harm to others and intervene as appropriate  Outcome: Progressing     Problem: INVOLUNTARY ADMIT  Goal: Will cooperate with staff recommendations and doctor's orders and will demonstrate appropriate behavior  Description: INTERVENTIONS:  - Treat underlying conditions and offer medication as ordered  - Educate regarding involuntary admission procedures and rules  - Utilize positive consistent limit setting strategies to support patient and staff safety  Outcome: Progressing

## 2023-09-24 NOTE — NURSING NOTE
Pt quiet withdrawn but visible. Pleasant on approach. Denies si.hi. avh. denies unmet needs. Compliant with evening meds. Continued q7m checks for safety.

## 2023-09-24 NOTE — PROGRESS NOTES
Progress Note - [facility] 43 y.o. male MRN: [id number]   Unit/Bed#: [facility] 382-02 Encounter: [id number]    Behavior over the last 24 hours: unchanged. Diana Wilkerson seen today, per staff report has been visible at times, mostly guarded on the unit. On exam today Diana Wilkerson starts by stating "forget everything I said to you yesterday" referring to his voicing not wanting to take medication upon discharge and not feeling that he needs medications. He states "I will take the medication when I go to rehab" followed by stating he does not feel that he truly needs them. Patient continues to appear paranoid, per nursing staff was questioning sources of water on the unit. Continues to feel that he got "juice poisoning" leading to his psychotic thinking. Poor insight and judgment. Sleep and appetite fair.   No reported side effects to medication      Mental Status Evaluation:    Appearance:  casually dressed, adequate grooming, looks stated age   Behavior:  guarded   Speech:  normal volume, increased rate   Mood:  anxious, mildly irritable   Affect:  inappropriate, labile   Thought Process:  illogical, circumstantial   Associations: circumstantial associations   Thought Content:  paranoid delusions   Perceptual Disturbances: denies auditory hallucinations when asked   Risk Potential: Suicidal ideation - None  Homicidal ideation - None at present   Sensorium:  oriented to person, place and time/date   Memory:  recent and remote memory grossly intact   Consciousness:  alert and awake   Attention: attention span and concentration are age appropriate   Insight:  impaired   Judgment: impaired   Gait/Station: normal gait/station   Motor Activity: no abnormal movements     Vital signs in last 24 hours:    Temp:  [97.6 °F (36.4 °C)-97.8 °F (36.6 °C)] 97.8 °F (36.6 °C)  HR:  [70-72] 72  Resp:  [16] 16  BP: (115-119)/(67-73) 115/73    Laboratory results: I have personally reviewed all pertinent laboratory/tests results. Assessment/Plan   Principal Problem:    Bipolar I disorder, most recent episode manic, severe with psychotic features ([address])  Active Problems:    Medical clearance for psychiatric admission    Tobacco abuse    Pain, dental    Recommended Treatment:     Planned medication and treatment changes:  Zyprexa 20 mg at bedtime, consider increase of this dosage if patient is plateaued in terms of benefit as he does appear to still have paranoia.   All current active medications have been reviewed  Encourage group therapy, milieu therapy and occupational therapy  Behavioral Health checks every 7 minutes  Current Facility-Administered Medications   Medication Dose Route Frequency Provider Last Rate   • acetaminophen  650 mg Oral Q6H PRN Ute Norton, DO     • acetaminophen  650 mg Oral Q4H PRN Ute Norton, DO     • acetaminophen  975 mg Oral Q6H PRN Ute Norton, DO     • aluminum-magnesium hydroxide-simethicone  30 mL Oral Q4H PRN Ute Norton, DO     • benzocaine   Mucosal 4x Daily PRN Anna Maynard, PA-C     • benztropine  1 mg Intramuscular BID PRN Ute Norton, DO     • benztropine  1 mg Oral BID PRN Ute Norton, DO     • hydrOXYzine HCL  50 mg Oral Q6H PRN Max 4/day Ute Norton, DO      Or   • diphenhydrAMINE  50 mg Intramuscular Q6H PRN Ute Norton, DO     • glycerin-hypromellose-  1 drop Both Eyes Q3H PRN Ute Norton, DO     • hydrOXYzine HCL  100 mg Oral Q6H PRN Max 4/day Ute Norton, DO      Or   • LORazepam  2 mg Intramuscular Q6H PRN Ute Norton, DO     • hydrOXYzine HCL  25 mg Oral Q6H PRN Max 4/day Ute Norton, DO     • melatonin  3 mg Oral HS PRN Ute Norton, DO     • nicotine  7 mg Transdermal Daily DIMAS AngC     • nicotine polacrilex  2 mg Oral Q2H PRN MICHELLE Kitchen     • OLANZapine  20 mg Oral HS Carrie Mills MD      Or   • OLANZapine  10 mg Intramuscular HS Carrie Mills MD     • OLANZapine  10 mg Oral Q3H PRN Max 3/day Michelle Lightning, DO      Or   • OLANZapine  10 mg Intramuscular Q3H PRN Max 3/day Michelle Lightning, DO     • OLANZapine  5 mg Oral Q3H PRN Max 6/day Michelle Lightning, DO      Or   • OLANZapine  5 mg Intramuscular Q3H PRN Max 6/day Michelle Lightning, DO     • OLANZapine  2.5 mg Oral Q3H PRN Max 8/day Michelle Lightning, DO     • polyethylene glycol  17 g Oral Daily PRN Michelle Lightning, DO     • propranolol  10 mg Oral Q8H PRN Michelle Lightning, DO     • senna-docusate sodium  1 tablet Oral Daily PRN Michelle Lightning, DO         Risks / Benefits of Treatment:    Risks, benefits, and possible side effects of medications explained to patient and patient verbalizes understanding and agreement for treatment. Counseling / Coordination of Care: Total floor / unit time spent today 35 minutes. Greater than 50% of total time was spent with the patient and / or family counseling and / or coordination of care. A description of counseling / coordination of care:  Patient's progress discussed with staff in treatment team meeting. Medications, treatment progress and treatment plan reviewed with patient.     Roseanne Faye MD 09/24/23

## 2023-09-24 NOTE — NURSING NOTE
Denies SI/HI/SIB/AVH/Pain. Remains calm and in control. Visible and social. Wearing own clothes. Offers no complaints.

## 2023-09-25 PROCEDURE — 99232 SBSQ HOSP IP/OBS MODERATE 35: CPT | Performed by: PSYCHIATRY & NEUROLOGY

## 2023-09-25 RX ORDER — OLANZAPINE 10 MG/1
10 INJECTION, POWDER, LYOPHILIZED, FOR SOLUTION INTRAMUSCULAR
Status: DISCONTINUED | OUTPATIENT
Start: 2023-09-25 | End: 2023-10-06

## 2023-09-25 RX ADMIN — NICOTINE POLACRILEX 2 MG: 2 GUM, CHEWING BUCCAL at 10:47

## 2023-09-25 RX ADMIN — OLANZAPINE 25 MG: 10 TABLET, ORALLY DISINTEGRATING ORAL at 21:05

## 2023-09-25 RX ADMIN — NICOTINE POLACRILEX 2 MG: 2 GUM, CHEWING BUCCAL at 06:38

## 2023-09-25 RX ADMIN — NICOTINE POLACRILEX 2 MG: 2 GUM, CHEWING BUCCAL at 18:42

## 2023-09-25 RX ADMIN — NICOTINE POLACRILEX 2 MG: 2 GUM, CHEWING BUCCAL at 14:55

## 2023-09-25 NOTE — NURSING NOTE
Pt visible but quiet mostly. Social with select peers. Attending evening group. Denies si.hi. avh. denies unmet needs. Cooperative, pleasant. Compliant with evening meds. Pt showered.    Continued q7m checks for safety

## 2023-09-25 NOTE — PROGRESS NOTES
09/25/23 0836   Team Meeting   Meeting Type Daily Rounds   Team Members Present   Team Members Present Physician;Nurse;   Physician Team Member [id number] Team Member Elpidio Riojas [facility]    Care Management Team Member Maria Esther   Patient/Family Present   Patient Present No   Patient's Family Present No      303, pt remains paranoid -being poisoned. Visible, social and attends some groups. Meds -increase Zyprexa to 25 mg. Continue to monitor. Pt is rehab bound. Continue to monitor. Discharge to be determined.

## 2023-09-25 NOTE — NURSING NOTE
Patient is present on the milieu socializing with select peers. Patient is seen attending and actively participating in groups. Patient denies SI/HI/AH/VH. Patient does not express any concerns at this time.

## 2023-09-25 NOTE — PROGRESS NOTES
BEHAVIORAL HEALTH SERVICE PROGRESS NOTE    Kathie Roblero 43 y.o. male MRN: [id number]  Unit/Bed#: [facility] 382-02 Encounter: [id number]         ASSESSMENT/PLAN     Kathie Roblero is a 43 y.o. male, with history of bipolar disorder, who was admitted to [address] on [address] involuntary commitment for mood instability and psychosis. Currently, will increase olanzapine to 25 mg HS. Principal Problem:    Bipolar I disorder, most recent episode manic, severe with psychotic features ([address])  Active Problems:    Medical clearance for psychiatric admission    Tobacco abuse    Pain, dental    Legal status: 303 involuntary commitment  Disposition: Return to previous residence, pending clinical stabilization    RECOMMENDED TREATMENT     Increase olanzapine to 25 mg HS for mood/psychosis  Continue nicotine 7 mg/24h patch daily for nicotine cravings  All current active medications have been reviewed  Encourage group therapy, milieu therapy and occupational therapy  Behavioral Health checks every 15 minutes    Risks / Benefits of Treatment: Risks, benefits, and possible side effects of medications explained to patient and patient verbalizes understanding and agreement for treatment. Counseling / Coordination of Care: Patient's progress discussed with staff in treatment team meeting. Medications, treatment progress and treatment plan reviewed with patient. Group attendance encouraged. SUBJECTIVE     Over the last 24 hours:  PRN medications: Nicotine 2 mg gum  Behavior over the last 24 hours: unchanged. Isha Acosta was seen and evaluated today for continuity of care. On interview, Isha Acosta appears frustrated and slightly agitated about medication regimen. He mentions having headaches due to the Zyprexa and fears that it will cause an exacerbation of his prior brain/head injuries. He also mentions how he does not feel he needs it because "it is an antipsychotic and I am not psychotic".  Educated patient on his medication regimen as well as common uses and side effects to address his concerns, following which he appeared less agitated. He was noted to be irritable and paranoid, commenting on if this writer is saying the truth to him and stating multiple times how he is angry at various individuals ranging from staff to individuals in the community. He denies auditory hallucinations and visual hallucinations. He states that has been feeling better. The patient denies current passive or active suicidal ideation, intent, or plan. Patient denies current passive or active homicidal ideation, intent, or plan. Patient reports  tolerating medications well and denies adverse effects at this time. Team discussed treatment plan, to which patient is amenable. Patient does not endorse additional questions or concerns at this time. Participates more in milieu. Attends group therapy.     Progress Toward Goals: progressing, more irritable, still paranoid    Review of Systems  Sleep: decreased, slept 3-4 hours  Appetite: normal  Medication side effects: No   ROS: reports headache and tiredness, denies any muscle aches, nausea or vomiting, all other systems are negative      OBJECTIVE     Vital signs in last 24 hours:   Temp:  [98.1 °F (36.7 °C)-98.6 °F (37 °C)] 98.1 °F (36.7 °C)  HR:  [63-89] 63  Resp:  [16] 16  BP: (111-114)/(72-74) 114/74    Mental Status Exam:  Appearance: alert, good eye contact, appears stated age, casually dressed and appropriate grooming and hygiene  Behavior: good eye contact, appears anxious, agitated, demanding  Motor: no abnormal movements  Gait/Station: normal gait/station, normal balance  Speech: normal rate, normal volume, normal pitch  Mood: "Frustrated"  Affect: constricted, Irritable, slightly anxious  Thought process: perseverative, circumstantial, normal rate of thoughts  Associations: circumstantial associations  Thought content: some paranoia  Perceptual disturbances: no auditory hallucinations, no visual hallucinations, does not appear responding to internal stimuli  Risk potential:   {Suicidal ideation - None  Homicidal ideation - None  Potential for aggression - Yes, due to agitation  Cognition: appears to be of average intelligence and cognition not formally tested  Sensorium: oriented to person, place and time/date  Memory: recent and remote memory grossly intact  Consciousness: alert and awake  Attention/Concentration: attention span and concentration are age appropriate  Insight: Limited    Judgment: Limited     Suicide/Homicide Risk Assessment:  Risk of Harm to Self:   Nursing Suicide Risk Assessment Last 24 hours: C-SSRS Risk (Since Last Contact)  Calculated C-SSRS Risk Score (Since Last Contact): No Risk Indicated  Current Specific Risk Factors include: diagnosis of mood disorder, mental illness diagnosis, current anxiety symptoms, current psychotic symptoms, presence of paranoid ideation, poor reasoning, poor impulse control  Protective Factors: no current suicidal plan or intent, ability to communicate with staff on the unit, able to contract for safety on the unit, taking medications as ordered on the unit, ability to manage anger well, ability to make plans for the future, improved anxiety symptoms, improved psychotic symptoms, compliant with mental health treatment, having a desire to live, personal beliefs  Based on today's assessment, Javid Zafar presents the following risk of harm to self: low  Risk of Harm to Others:  Nursing Homicide Risk Assessment: Violence Risk to Others: Denies within past 6 months  Current Specific Risk Factors include: current agitation, unstable mood disorder, diagnosis of mood disorder, current psychotic symptoms  Protective Factors: no current homicidal ideation, able to communicate with staff on the unit, improved impulse control, mood is stabilizing, psychotic symptoms are less prominent, compliant with medications on the unit as ordered, compliant with unit milieu, follows staff redirection, willing to continue psychiatric treatment, willing to remain free from substance use  Based on today's assessment, Darvin Verma presents the following risk of harm to others: low  The following interventions are recommended: behavioral checks every 7 minutes, continued hospitalization on locked unit              ACTIVE MEDICATIONS     Current Medications:  Current Facility-Administered Medications   Medication Dose Route Frequency Provider Last Rate   • acetaminophen  650 mg Oral Q6H PRN Ute Norton, DO     • acetaminophen  650 mg Oral Q4H PRN Ute Norton, DO     • acetaminophen  975 mg Oral Q6H PRN Ute Norton, DO     • aluminum-magnesium hydroxide-simethicone  30 mL Oral Q4H PRN Uteeriberto Norton, DO     • benzocaine   Mucosal 4x Daily PRN DIMAS AngC     • benztropine  1 mg Intramuscular BID PRN Ute Norton, DO     • benztropine  1 mg Oral BID PRN Ute Norton, DO     • hydrOXYzine HCL  50 mg Oral Q6H PRN Max 4/day Ute Norton, DO      Or   • diphenhydrAMINE  50 mg Intramuscular Q6H PRN Ute Norton, DO     • glycerin-hypromellose-  1 drop Both Eyes Q3H PRN Uteeriberto Norton, DO     • hydrOXYzine HCL  100 mg Oral Q6H PRN Max 4/day Ute Norton, DO      Or   • LORazepam  2 mg Intramuscular Q6H PRN Ute Norton, DO     • hydrOXYzine HCL  25 mg Oral Q6H PRN Max 4/day Ute Norton, DO     • melatonin  3 mg Oral HS PRN Ute Norton, DO     • nicotine  7 mg Transdermal Daily Anna Maynard PA-C     • nicotine polacrilex  2 mg Oral Q2H PRN MICHELLE Kitchen     • OLANZapine  25 mg Oral HS Carrie Mills MD      Or   • OLANZapine  10 mg Intramuscular HS Carrie Mills MD     • OLANZapine  10 mg Oral Q3H PRN Max 3/day Ute Norton, DO      Or   • OLANZapine  10 mg Intramuscular Q3H PRN Max 3/day Ute Norton, DO     • OLANZapine  5 mg Oral Q3H PRN Max 6/day Ute Norton, DO      Or   • OLANZapine  5 mg Intramuscular Q3H PRN Max 6/day Ute Norton, DO     • OLANZapine  2.5 mg Oral Q3H PRN Max 8/day Breanne Robert,      • polyethylene glycol  17 g Oral Daily PRN Breanne Robert DO     • propranolol  10 mg Oral Q8H PRN Breanne Robert DO     • senna-docusate sodium  1 tablet Oral Daily PRN Breanne Robert DO         Behavioral Health Medications: I have personally reviewed all current active medications. Changes as in plan section above. ADDITIONAL DATA     EKG Results: reviewed  Results for orders placed or performed during the hospital encounter of 09/14/23 (from the past 1000 hour(s))   ECG 12 lead    Collection Time: 09/19/23 12:13 PM   Result Value    Ventricular Rate 58    Atrial Rate 58    GA Interval 154    QRSD Interval 106    QT Interval 406    QTC Interval 398    P Axis 60    QRS Axis 77    T Wave Axis 64    Narrative    Sinus bradycardia  Otherwise normal ECG  When compared with ECG of 19-SEP-2023 12:12, (unconfirmed)  No significant change was found  Confirmed by Mehrdad Hatfield ([id number]) on 9/19/2023 5:08:22 PM     *Note: Due to a large number of results and/or encounters for the requested time period, some results have not been displayed. A complete set of results can be found in Results Review. Radiology Results: Reviewed  No results found. No Chest XR results available for this patient. Laboratory Results: I have personally reviewed all pertinent laboratory/tests results  Results from the past 24 hours: No results found for this or any previous visit (from the past 24 hour(s)).       This note was not shared with the patient due to reasonable likelihood of causing patient harm        Claude Smack, MD 09/25/23  Department of Psychiatry and [facility]

## 2023-09-25 NOTE — PLAN OF CARE
Problem: Alteration in Thoughts and Perception  Goal: Treatment Goal: Gain control of psychotic behaviors/thinking, reduce/eliminate presenting symptoms and demonstrate improved reality functioning upon discharge  Outcome: Progressing  Goal: Verbalize thoughts and feelings  Description: Interventions:  - Promote a nonjudgmental and trusting relationship with the patient through active listening and therapeutic communication  - Assess patient's level of functioning, behavior and potential for risk  - Engage patient in 1 on 1 interactions  - Encourage patient to express fears, feelings, frustrations, and discuss symptoms    - [place] patient to reality, help patient recognize reality-based thinking   - Administer medications as ordered and assess for potential side effects  - Provide the patient education related to the signs and symptoms of the illness and desired effects of prescribed medications  Outcome: Progressing  Goal: Refrain from acting on delusional thinking/internal stimuli  Description: Interventions:  - Monitor patient closely, per order   - Utilize least restrictive measures   - Set reasonable limits, give positive feedback for acceptable   - Administer medications as ordered and monitor of potential side effects  Outcome: Progressing  Goal: Agree to be compliant with medication regime, as prescribed and report medication side effects  Description: Interventions:  - Offer appropriate PRN medication and supervise ingestion; conduct AIMS, as needed   Outcome: Progressing  Goal: Attend and participate in unit activities, including therapeutic, recreational, and educational groups  Description: Interventions:  -Encourage Visitation and family involvement in care  Outcome: Progressing  Goal: Complete daily ADLs, including personal hygiene independently, as able  Description: Interventions:  - Observe, teach, and assist patient with ADLS  - Monitor and promote a balance of rest/activity, with adequate nutrition and elimination   Outcome: Progressing

## 2023-09-26 PROCEDURE — 99232 SBSQ HOSP IP/OBS MODERATE 35: CPT | Performed by: PSYCHIATRY & NEUROLOGY

## 2023-09-26 RX ORDER — LANOLIN ALCOHOL/MO/W.PET/CERES
3 CREAM (GRAM) TOPICAL
Status: DISCONTINUED | OUTPATIENT
Start: 2023-09-26 | End: 2023-10-06 | Stop reason: HOSPADM

## 2023-09-26 RX ADMIN — NICOTINE POLACRILEX 2 MG: 2 GUM, CHEWING BUCCAL at 19:39

## 2023-09-26 RX ADMIN — OLANZAPINE 25 MG: 10 TABLET, ORALLY DISINTEGRATING ORAL at 21:07

## 2023-09-26 RX ADMIN — NICOTINE POLACRILEX 2 MG: 2 GUM, CHEWING BUCCAL at 13:37

## 2023-09-26 RX ADMIN — MELATONIN TAB 3 MG 3 MG: 3 TAB at 21:07

## 2023-09-26 RX ADMIN — NICOTINE POLACRILEX 2 MG: 2 GUM, CHEWING BUCCAL at 09:55

## 2023-09-26 NOTE — PROGRESS NOTES
09/26/23 0813   Team Meeting   Meeting Type Daily Rounds   Team Members Present   Team Members Present Physician;Nurse;   Physician Team Member [id number] Team Member JORGE WILDE [facility] Management Team Member Maria Esther   Patient/Family Present   Patient Present No   Patient's Family Present No     303, Pt remains disorganized -paranoid and labile -irritable. Visible and social. Attends some groups. Compliant with meds and meals. Continue Zyprexa. Pt is inpatient Rehab bound. Discharge to be determined.

## 2023-09-26 NOTE — NURSING NOTE
Pt is isolative to his room this evening, only visible during snack time. He is calm and cooperative. Denies SI/HI/AVH. No problems observed or reported. Q 7 min checks maintained for safety.

## 2023-09-26 NOTE — PLAN OF CARE
Problem: Alteration in Thoughts and Perception  Goal: Treatment Goal: Gain control of psychotic behaviors/thinking, reduce/eliminate presenting symptoms and demonstrate improved reality functioning upon discharge  Outcome: Progressing  Goal: Verbalize thoughts and feelings  Description: Interventions:  - Promote a nonjudgmental and trusting relationship with the patient through active listening and therapeutic communication  - Assess patient's level of functioning, behavior and potential for risk  - Engage patient in 1 on 1 interactions  - Encourage patient to express fears, feelings, frustrations, and discuss symptoms    - [place] patient to reality, help patient recognize reality-based thinking   - Administer medications as ordered and assess for potential side effects  - Provide the patient education related to the signs and symptoms of the illness and desired effects of prescribed medications  Outcome: Progressing  Goal: Refrain from acting on delusional thinking/internal stimuli  Description: Interventions:  - Monitor patient closely, per order   - Utilize least restrictive measures   - Set reasonable limits, give positive feedback for acceptable   - Administer medications as ordered and monitor of potential side effects  Outcome: Progressing  Goal: Agree to be compliant with medication regime, as prescribed and report medication side effects  Description: Interventions:  - Offer appropriate PRN medication and supervise ingestion; conduct AIMS, as needed   Outcome: Progressing  Goal: Attend and participate in unit activities, including therapeutic, recreational, and educational groups  Description: Interventions:  -Encourage Visitation and family involvement in care  Outcome: Progressing  Goal: Recognize dysfunctional thoughts, communicate reality-based thoughts at the time of discharge  Description: Interventions:  - Provide medication and psycho-education to assist patient in compliance and developing insight into his/her illness   Outcome: Progressing  Goal: Complete daily ADLs, including personal hygiene independently, as able  Description: Interventions:  - Observe, teach, and assist patient with ADLS  - Monitor and promote a balance of rest/activity, with adequate nutrition and elimination   Outcome: Progressing     Problem: Ineffective Coping  Goal: Demonstrates healthy coping skills  Outcome: Progressing  Goal: Participates in unit activities  Description: Interventions:  - Provide therapeutic environment   - Provide required programming   - Redirect inappropriate behaviors   Outcome: Progressing     Problem: DISCHARGE PLANNING  Goal: Discharge to home or other facility with appropriate resources  Description: INTERVENTIONS:  - Identify barriers to discharge w/patient and caregiver  - Arrange for needed discharge resources and transportation as appropriate  - Identify discharge learning needs (meds, wound care, etc.)  - Arrange for interpretive services to assist at discharge as needed  - Refer to Case Management Department for coordinating discharge planning if the patient needs post-hospital services based on physician/advanced practitioner order or complex needs related to functional status, cognitive ability, or social support system  Outcome: Progressing     Problem: PSYCHOSIS  Goal: Will report no hallucinations or delusions  Description: Interventions:  - Administer medication as  ordered  - Every waking shifts and PRN assess for the presence of hallucinations and or delusions  - Assist with reality testing to support increasing orientation  - Assess if patient's hallucinations or delusions are encouraging self-harm or harm to others and intervene as appropriate  Outcome: Progressing     Problem: INVOLUNTARY ADMIT  Goal: Will cooperate with staff recommendations and doctor's orders and will demonstrate appropriate behavior  Description: INTERVENTIONS:  - Treat underlying conditions and offer medication as ordered  - Educate regarding involuntary admission procedures and rules  - Utilize positive consistent limit setting strategies to support patient and staff safety  Outcome: Progressing

## 2023-09-26 NOTE — NURSING NOTE
Patient is visible walking the milieu. Patient is calm and cooperative upon approach. Patient denies SI/HI/AH/VH. Patient is compliant with meds and meals.

## 2023-09-26 NOTE — PROGRESS NOTES
BEHAVIORAL HEALTH SERVICE PROGRESS NOTE    Neva Ayala 43 y.o. male MRN: [id number]  Unit/Bed#: [address] 382-02 Encounter: [id number]         ASSESSMENT/PLAN     Neva Ayala is a 43 y.o. male, with history of bipolar affective disorder, who was admitted to [address] on 302 involuntary commitment for mood instability and psychosis. Currently, will schedule HS melatonin. Principal Problem:    Bipolar I disorder, most recent episode manic, severe with psychotic features ([address])  Active Problems:    Medical clearance for psychiatric admission    Tobacco abuse    Pain, dental      RECOMMENDED TREATMENT     Continue olanzapine 25 mg at bedtime for mood/psychosis OR olanzapine 10 mg IM HS for meds over objection  Continue nicotine 7 mg / 24-hour patch daily for nicotine cravings  Start melatonin 3 mg at bedtime for sleep  All current active medications have been reviewed  Encourage group therapy, milieu therapy and occupational therapy  Behavioral Health checks every 15 minutes    Risks / Benefits of Treatment: Risks, benefits, and possible side effects of medications explained to patient and patient verbalizes understanding and agreement for treatment. Counseling / Coordination of Care: Patient's progress discussed with staff in treatment team meeting. Medications, treatment progress and treatment plan reviewed with patient. Legal status: 303 involuntary commitment  Disposition: return to previous living arrangement, pending clinical stabilization         SUBJECTIVE     Over the last 24 hours:  Per nursing note: " Pt is isolative to his room this evening, only visible during snack time. He is calm and cooperative. Denies SI/HI/AVH. No problems observed or reported. Q 7 min checks maintained for safety.  "  PRN medications: nicotine gum  Behavior over the last 24 hours: slightly more improved. The patient was seen and evaluated today for continuity of care.  On interview, the patient is sitting comfortably in his bed. Patient reports "better" mood. Patient endorses "low" energy levels. Patient reports "2 to 3 hours" sleep, endorsing trouble falling and staying asleep. Per patient, appetite is " great". Patient reports feeling less anxious and less depressed. Currently, patient denies auditory hallucinations and denies visual hallucinations. The patient denies current passive or active suicidal ideation, intent, or plan. Patient denies current passive or active homicidal ideation, intent, or plan. Patient reports tolerating medications well and denies adverse effects at this time. Team discussed treatment plan, to which patient is amenable. Patient does not endorse additional questions or concerns at this time. Compliant with medications. Participates more in milieu. Participates appropriately in group therapy.     Progress Toward Goals: progressing, improving gradually, improved mood and anxiety    Review of Systems:  Sleep: decreased, 2 to 3 hours  Appetite: normal  Medication side effects: No   ROS: reports tiredness, denies any abdominal pain, dizziness or nausea, all other systems are negative      OBJECTIVE     Vital signs in last 24 hours:   Temp:  [97.8 °F (36.6 °C)-98.4 °F (36.9 °C)] 97.8 °F (36.6 °C)  HR:  [67-84] 67  Resp:  [17] 17  BP: (109)/(60-65) 109/65    Mental Status Exam:  Appearance: alert, appears stated age, casually dressed and marginal grooming/hygiene  Behavior: cooperative, mildly anxious, more calm, somewhat pleasant  Motor: no abnormal movements  Gait/Station: normal gait/station, normal balance  Speech: normal rate, normal volume, normal pitch  Mood: "better"  Affect: constricted, slightly brighter  Thought process: goal directed, normal rate of thoughts  Associations: intact associations  Thought content: mild paranoia  Perceptual disturbances: no auditory hallucinations, no visual hallucinations, does not appear responding to internal stimuli  Risk potential:   Suicidal ideation - None  Homicidal ideation - None  Potential for aggression - Yes due to history of agitation  Cognition: appears to be of average intelligence, cognition not formally tested  Sensorium: oriented to person, place and time/date  Memory: recent and remote memory grossly intact  Consciousness: alert and awake  Attention/Concentration: attention span and concentration are age appropriate  Insight: Limited  Judgment: Limited     Suicide/Homicide Risk Assessment:  Risk of Harm to Self:   Nursing Suicide Risk Assessment Last 24 hours: C-SSRS Risk (Since Last Contact)  Calculated C-SSRS Risk Score (Since Last Contact): No Risk Indicated  Current Specific Risk Factors include: mental illness diagnosis, current anxiety symptoms  Protective Factors: no current suicidal ideation, ability to make plans for the future, improved mood, improved depressive symptoms, improved anxiety symptoms, responds to redirection  Based on today's assessment, Yannick Waller presents the following risk of harm to self: low  Risk of Harm to Others:  Nursing Homicide Risk Assessment: Violence Risk to Others: Denies within past 6 months  Current Specific Risk Factors include: diagnosis of mood disorder, social difficulties  Protective Factors: no current homicidal ideation, able to communicate with staff on the unit, impulse control is improving, mood is stabilizing, compliant with unit milieu  Based on today's assessment, Yannick Waller presents the following risk of harm to others: low  The following interventions are recommended: behavioral checks every 7 minutes, continued hospitalization on locked unit      Nutrition Assessment and Intervention:     Reviewed food recall journal      Emotional and Mental Well-being, Sleep, Connectedness Assessment and Intervention:    Sleep/stress assessment performed           ACTIVE MEDICATIONS     Current Medications:  Current Facility-Administered Medications   Medication Dose Route Frequency Provider Last Rate   • acetaminophen  650 mg Oral Q6H PRN [facility], DO     • acetaminophen  650 mg Oral Q4H PRN [facility], DO     • acetaminophen  975 mg Oral Q6H PRN [facility], DO     • aluminum-magnesium hydroxide-simethicone  30 mL Oral Q4H PRN [facility], DO     • benzocaine   Mucosal 4x Daily PRN Lou Sanchez PA-C     • benztropine  1 mg Intramuscular BID PRN [facility], DO     • benztropine  1 mg Oral BID PRN [facility], DO     • hydrOXYzine HCL  50 mg Oral Q6H PRN Max 4/day [facility], DO      Or   • diphenhydrAMINE  50 mg Intramuscular Q6H PRN [facility], DO     • glycerin-hypromellose-  1 drop Both Eyes Q3H PRN [facility], DO     • hydrOXYzine HCL  100 mg Oral Q6H PRN Max 4/day [facility], DO      Or   • LORazepam  2 mg Intramuscular Q6H PRN [facility], DO     • hydrOXYzine HCL  25 mg Oral Q6H PRN Max 4/day [facility], DO     • melatonin  3 mg Oral HS PRN [facility], DO     • nicotine  7 mg Transdermal Daily Lou Sanchez PA-C     • nicotine polacrilex  2 mg Oral Q2H PRN MICHELLE Kitchen     • OLANZapine  25 mg Oral HS Sosa Beal MD      Or   • OLANZapine  10 mg Intramuscular HS Sosa Beal MD     • OLANZapine  10 mg Oral Q3H PRN Max 3/day [facility], DO      Or   • OLANZapine  10 mg Intramuscular Q3H PRN Max 3/day [facility], DO     • OLANZapine  5 mg Oral Q3H PRN Max 6/day [facility], DO      Or   • OLANZapine  5 mg Intramuscular Q3H PRN Max 6/day [facility], DO     • OLANZapine  2.5 mg Oral Q3H PRN Max 8/day [facility], DO     • polyethylene glycol  17 g Oral Daily PRN [facility], DO     • propranolol  10 mg Oral Q8H PRN [facility], DO     • senna-docusate sodium  1 tablet Oral Daily PRN [facility], DO         Behavioral Health Medications: I have personally reviewed all current active medications. Changes as in plan section above.       ADDITIONAL DATA     EKG Results: reviewed  Results for orders placed or performed during the hospital encounter of 09/14/23 (from the past 1000 hour(s))   ECG 12 lead    Collection Time: 09/19/23 12:13 PM   Result Value    Ventricular Rate 58    Atrial Rate 58    [place] Interval 154    QRSD Interval 106    QT Interval 406    QTC Interval 398    P Axis 60    QRS Axis 77    T Wave Axis 64    Narrative    Sinus bradycardia  Otherwise normal ECG  When compared with ECG of 19-SEP-2023 12:12, (unconfirmed)  No significant change was found  Confirmed by Sin Flores ([id number]) on 9/19/2023 5:08:22 PM     *Note: Due to a large number of results and/or encounters for the requested time period, some results have not been displayed. A complete set of results can be found in Results Review. Radiology Results: Reviewed  No results found. No Chest XR results available for this patient. Laboratory Results: I have personally reviewed all pertinent laboratory/tests results  Results from the past 24 hours: No results found for this or any previous visit (from the past 24 hour(s)).       This note was not shared with the patient due to reasonable likelihood of causing patient harm        Marek Prince MD 09/26/23  Department of Psychiatry and [facility]

## 2023-09-27 PROCEDURE — 99232 SBSQ HOSP IP/OBS MODERATE 35: CPT | Performed by: PSYCHIATRY & NEUROLOGY

## 2023-09-27 RX ADMIN — NICOTINE POLACRILEX 2 MG: 2 GUM, CHEWING BUCCAL at 19:58

## 2023-09-27 RX ADMIN — OLANZAPINE 25 MG: 10 TABLET, ORALLY DISINTEGRATING ORAL at 21:07

## 2023-09-27 RX ADMIN — NICOTINE POLACRILEX 2 MG: 2 GUM, CHEWING BUCCAL at 09:00

## 2023-09-27 RX ADMIN — MELATONIN TAB 3 MG 3 MG: 3 TAB at 21:07

## 2023-09-27 RX ADMIN — NICOTINE POLACRILEX 2 MG: 2 GUM, CHEWING BUCCAL at 17:04

## 2023-09-27 NOTE — NURSING NOTE
Patient visible walking in milieu, socializing with peers. Patient is calm and cooperative upon approach. Patient denies SI/HI/AH/VH. Patient is compliant with meds and meals.

## 2023-09-27 NOTE — NURSING NOTE
Pt visible on unit, pleasant, calm, cooperative. Pt denies needs other than nicotine gum. Pt denies SI/HI/AH/VH.

## 2023-09-27 NOTE — PROGRESS NOTES
09/27/23 0879   Team Meeting   Meeting Type Daily Rounds   Team Members Present   Team Members Present Physician;Nurse;   Physician Team Member [id number] Team Member JORGE WILDE [facility] Management Team Member Maria Esther   Patient/Family Present   Patient Present No   Patient's Family Present No      303, Pt less paranoid and irritable. Attends some groups. Continue meds -Zyprexa. Pt refuses mood stabilizer. Continue to monitor. Discharge to be determined.

## 2023-09-27 NOTE — PROGRESS NOTES
BEHAVIORAL HEALTH SERVICE PROGRESS NOTE    Faiza Campos 43 y.o. male MRN: [id number]  Unit/Bed#: [address] 382-02 Encounter: [id number]         ASSESSMENT/PLAN     Faiza Campos is a 43 y.o. male, with history of bipolar affective disorder, who was admitted to [address] on 302 involuntary commitment for mood instability and psychosis. Currently, will continue current medication regimen. Principal Problem:    Bipolar I disorder, most recent episode manic, severe with psychotic features ([address])  Active Problems:    Medical clearance for psychiatric admission    Tobacco abuse    Pain, dental      RECOMMENDED TREATMENT     Continue olanzapine 25 mg HS for mood/psychosis (OR olanzapine 10 mg IM for medication over objection)  Continue melatonin 3 mg HS for sleep  Continue nicotine 7 mg patch daily for nicotine cravings  All current active medications have been reviewed  Encourage group therapy, milieu therapy and occupational therapy  Behavioral Health checks every 15 minutes    Risks / Benefits of Treatment: Risks, benefits, and possible side effects of medications explained to patient and patient verbalizes understanding and agreement for treatment. Counseling / Coordination of Care: Patient's progress discussed with staff in treatment team meeting. Medications, treatment progress and treatment plan reviewed with patient. Legal status: 303 involuntary commitment  Disposition: referrals as indicated, pending clinical stabilization         SUBJECTIVE     Over the last 24 hours:  Per nursing note: " Pt visible on unit, pleasant, calm, cooperative. Pt denies needs other than nicotine gum. Pt denies SI/HI/AH/VH.  "  PRN medications: nicotine 2 mg gum  Behavior over the last 24 hours: some improvement. The patient was seen and evaluated today for continuity of care. On interview, the patient is sitting on chair with legs crossed. Patient reports "good" mood. Patient endorses "still tired but better" energy levels. Patient reports "good" sleep, for 8-9 hours without bad dreams and one awakening to go to bathroom before falling asleep again. Per patient, appetite is "good", reporting he sometimes gets hungry despite double portions. Patient reports feeling much better. He shares about how he plans with work with rehab on rental assistance program to obtain studio apartment. He expresses looking forward to going to to rehab and the future. Currently, patient denies auditory hallucinations and denies visual hallucinations. The patient denies current passive or active suicidal ideation, intent, or plan. Patient denies current passive or active homicidal ideation, intent, or plan. Patient reports tolerating medications well and denies adverse effects at this time. Team discussed treatment plan, to which patient is amenable. Patient does not endorse additional questions or concerns at this time. Compliant with medications. Follows directions appropriately. Attends groups. More visible on the unit. Socializes with peers.     Progress Toward Goals: progressing, less anxious, less depressed    Review of Systems:  Sleep: normal, improved, slept better  Appetite: normal  Medication side effects: No   ROS: no complaints, denies any headache, shortness of breath, chest pain or dizziness, all other systems are negative      OBJECTIVE     Vital signs in last 24 hours:   Temp:  [98.3 °F (36.8 °C)-99.6 °F (37.6 °C)] 99.6 °F (37.6 °C)  HR:  [94-99] 99  Resp:  [16] 16  BP: (110-124)/(59-74) 110/59    Mental Status Exam:  Appearance: alert, good eye contact, appears stated age, casually dressed, marginal grooming/hygiene, dark hair and smiling  Behavior: cooperative, less anxious, less restless  Motor: no abnormal movements  Gait/Station: normal gait/station, normal balance  Speech: normal rate, normal volume, normal pitch, spontaneous, fluent, clear, coherent  Mood: "good"  Affect: slightly brighter, less constricted  Thought process: goal directed, normal rate of thoughts  Associations: intact associations  Thought content: no overt delusions  Perceptual disturbances: no auditory hallucinations, no visual hallucinations, does not appear responding to internal stimuli  Risk potential:   Suicidal ideation - None  Homicidal ideation - None  Potential for aggression - Yes due to history of agitation  Sensorium: oriented to person, place and time/date  Cognition: recent and remote memory grossly intact  Consciousness: alert and awake  Attention/Concentration: attention span and concentration are age appropriate  Insight: Limited  Judgment: Limited     Suicide/Homicide Risk Assessment:  Risk of Harm to Self:   Nursing Suicide Risk Assessment Last 24 hours: C-SSRS Risk (Since Last Contact)  Calculated C-SSRS Risk Score (Since Last Contact): No Risk Indicated  Current Specific Risk Factors include: mental illness diagnosis, health problems, substance use  Protective Factors: no current suicidal ideation, improved depressive symptoms, improved anxiety symptoms, responds to redirection, compliant with medications, compliant with mental health treatment  Based on today's assessment, Javid Zafar presents the following risk of harm to self: low  Risk of Harm to Others:  Nursing Homicide Risk Assessment: Violence Risk to Others: Denies within past 6 months  Current Specific Risk Factors include: recent substance use, multiple stressors  Protective Factors: no current homicidal ideation, impulse control is improving, improved mood, compliant with medications on the unit as ordered, compliant with unit milieu, effective coping skills  Based on today's assessment, Javid Zafar presents the following risk of harm to others: low  The following interventions are recommended: behavioral checks every 7 minutes, continued hospitalization on locked unit      Nutrition Assessment and Intervention:     Reviewed food recall journal      Physical Activity Assessment and Intervention:    Activity journal reviewed      Emotional and Mental Well-being, Sleep, Connectedness Assessment and Intervention:    Sleep/stress assessment performed      Tobacco and Toxic Substance Assessment and Intervention:     Alcohol and drug use screening performed           ACTIVE MEDICATIONS     Current Medications:  Current Facility-Administered Medications   Medication Dose Route Frequency Provider Last Rate   • acetaminophen  650 mg Oral Q6H PRN Corlis Links, DO     • acetaminophen  650 mg Oral Q4H PRN Corlis Links, DO     • acetaminophen  975 mg Oral Q6H PRN Corlis Links, DO     • aluminum-magnesium hydroxide-simethicone  30 mL Oral Q4H PRN Corlis Links, DO     • benzocaine   Mucosal 4x Daily PRN Adam Hernandez PA-C     • benztropine  1 mg Intramuscular BID PRN Corlis Links, DO     • benztropine  1 mg Oral BID PRN Corlis Links, DO     • hydrOXYzine HCL  50 mg Oral Q6H PRN Max 4/day Corlis Links, DO      Or   • diphenhydrAMINE  50 mg Intramuscular Q6H PRN Corlis Links, DO     • glycerin-hypromellose-  1 drop Both Eyes Q3H PRN Corlis Links, DO     • hydrOXYzine HCL  100 mg Oral Q6H PRN Max 4/day Corlis Links, DO      Or   • LORazepam  2 mg Intramuscular Q6H PRN Corlis Links, DO     • hydrOXYzine HCL  25 mg Oral Q6H PRN Max 4/day Jen Villegas, DO     • melatonin  3 mg Oral HS Zenaida Finney MD     • nicotine  7 mg Transdermal Daily Adam Hernandez PA-C     • nicotine polacrilex  2 mg Oral Q2H PRN MICHELLE Kitchen     • OLANZapine  25 mg Oral HS Deb Watts MD      Or   • OLANZapine  10 mg Intramuscular HS Deb Watts MD     • OLANZapine  10 mg Oral Q3H PRN Max 3/day Corlis Links, DO      Or   • OLANZapine  10 mg Intramuscular Q3H PRN Max 3/day Corlis Links, DO     • OLANZapine  5 mg Oral Q3H PRN Max 6/day Corlis Links, DO      Or   • OLANZapine  5 mg Intramuscular Q3H PRN Max 6/day Jen Villegas, DO     • OLANZapine  2.5 mg Oral Q3H PRN Max 8/day Yasmin Tineo, DO     • polyethylene glycol  17 g Oral Daily PRN Yasmin Tineo DO     • propranolol  10 mg Oral Q8H PRN Yasmin Tineo DO     • senna-docusate sodium  1 tablet Oral Daily PRN Yasmin Tineo DO         Behavioral Health Medications: I have personally reviewed all current active medications. Changes as in plan section above. ADDITIONAL DATA     EKG Results: reviewed  Results for orders placed or performed during the hospital encounter of 09/14/23 (from the past 1000 hour(s))   ECG 12 lead    Collection Time: 09/19/23 12:13 PM   Result Value    Ventricular Rate 58    Atrial Rate 58    CT Interval 154    QRSD Interval 106    QT Interval 406    QTC Interval 398    P Axis 60    QRS Axis 77    T Wave Axis 64    Narrative    Sinus bradycardia  Otherwise normal ECG  When compared with ECG of 19-SEP-2023 12:12, (unconfirmed)  No significant change was found  Confirmed by Casie Mccoy ([id number]) on 9/19/2023 5:08:22 PM     *Note: Due to a large number of results and/or encounters for the requested time period, some results have not been displayed. A complete set of results can be found in Results Review. Radiology Results: Reviewed, no new imaging  No results found. No Chest XR results available for this patient. Laboratory Results: I have personally reviewed all pertinent laboratory/tests results  Results from the past 24 hours: No results found for this or any previous visit (from the past 24 hour(s)).       This note was not shared with the patient due to reasonable likelihood of causing patient harm        Renetta Figueroa MD 09/27/23  Department of Psychiatry and [facility]

## 2023-09-27 NOTE — PLAN OF CARE
Problem: Alteration in Thoughts and Perception  Goal: Treatment Goal: Gain control of psychotic behaviors/thinking, reduce/eliminate presenting symptoms and demonstrate improved reality functioning upon discharge  Outcome: Progressing  Goal: Verbalize thoughts and feelings  Description: Interventions:  - Promote a nonjudgmental and trusting relationship with the patient through active listening and therapeutic communication  - Assess patient's level of functioning, behavior and potential for risk  - Engage patient in 1 on 1 interactions  - Encourage patient to express fears, feelings, frustrations, and discuss symptoms    - [place] patient to reality, help patient recognize reality-based thinking   - Administer medications as ordered and assess for potential side effects  - Provide the patient education related to the signs and symptoms of the illness and desired effects of prescribed medications  Outcome: Progressing  Goal: Refrain from acting on delusional thinking/internal stimuli  Description: Interventions:  - Monitor patient closely, per order   - Utilize least restrictive measures   - Set reasonable limits, give positive feedback for acceptable   - Administer medications as ordered and monitor of potential side effects  Outcome: Progressing  Goal: Agree to be compliant with medication regime, as prescribed and report medication side effects  Description: Interventions:  - Offer appropriate PRN medication and supervise ingestion; conduct AIMS, as needed   Outcome: Progressing  Goal: Attend and participate in unit activities, including therapeutic, recreational, and educational groups  Description: Interventions:  -Encourage Visitation and family involvement in care  Outcome: Progressing  Goal: Recognize dysfunctional thoughts, communicate reality-based thoughts at the time of discharge  Description: Interventions:  - Provide medication and psycho-education to assist patient in compliance and developing insight into his/her illness   Outcome: Progressing  Goal: Complete daily ADLs, including personal hygiene independently, as able  Description: Interventions:  - Observe, teach, and assist patient with ADLS  - Monitor and promote a balance of rest/activity, with adequate nutrition and elimination   Outcome: Progressing     Problem: Ineffective Coping  Goal: Demonstrates healthy coping skills  Outcome: Progressing  Goal: Participates in unit activities  Description: Interventions:  - Provide therapeutic environment   - Provide required programming   - Redirect inappropriate behaviors   Outcome: Progressing     Problem: PSYCHOSIS  Goal: Will report no hallucinations or delusions  Description: Interventions:  - Administer medication as  ordered  - Every waking shifts and PRN assess for the presence of hallucinations and or delusions  - Assist with reality testing to support increasing orientation  - Assess if patient's hallucinations or delusions are encouraging self-harm or harm to others and intervene as appropriate  Outcome: Progressing     Problem: INVOLUNTARY ADMIT  Goal: Will cooperate with staff recommendations and doctor's orders and will demonstrate appropriate behavior  Description: INTERVENTIONS:  - Treat underlying conditions and offer medication as ordered  - Educate regarding involuntary admission procedures and rules  - Utilize positive consistent limit setting strategies to support patient and staff safety  Outcome: Progressing

## 2023-09-28 PROCEDURE — 99232 SBSQ HOSP IP/OBS MODERATE 35: CPT | Performed by: PSYCHIATRY & NEUROLOGY

## 2023-09-28 RX ADMIN — NICOTINE POLACRILEX 2 MG: 2 GUM, CHEWING BUCCAL at 16:45

## 2023-09-28 RX ADMIN — NICOTINE POLACRILEX 2 MG: 2 GUM, CHEWING BUCCAL at 08:11

## 2023-09-28 RX ADMIN — NICOTINE POLACRILEX 2 MG: 2 GUM, CHEWING BUCCAL at 12:56

## 2023-09-28 RX ADMIN — MELATONIN TAB 3 MG 3 MG: 3 TAB at 21:21

## 2023-09-28 RX ADMIN — OLANZAPINE 25 MG: 10 TABLET, ORALLY DISINTEGRATING ORAL at 21:21

## 2023-09-28 NOTE — PROGRESS NOTES
BEHAVIORAL HEALTH SERVICE PROGRESS NOTE    Melvina Tello 43 y.o. male MRN: [id number]  Unit/Bed#: [address] 382-02 Encounter: [id number]         ASSESSMENT/PLAN     Melvina Tello is  a 43 y.o. male, with history of bipolar affective disorder, who was admitted to [address] on 302 involuntary commitment for unstable mood and psychosis. Currently, no medication adjustments today. Principal Problem:    Bipolar I disorder, most recent episode manic, severe with psychotic features ([address])  Active Problems:    Medical clearance for psychiatric admission    Tobacco abuse    Pain, dental      RECOMMENDED TREATMENT     Continue olanzapine 25 mg HS for mood/psychosis (OR olanzapine 10 mg IM for medication over objection)  Continue melatonin 3 mg HS for sleep  Continue nicotine 7 mg patch daily for nicotine cravings  All current active medications have been reviewed  Encourage group therapy, milieu therapy and occupational therapy  Behavioral Health checks every 15 minutes    Risks / Benefits of Treatment: Risks, benefits, and possible side effects of medications explained to patient and patient verbalizes understanding and agreement for treatment. Counseling / Coordination of Care: Patient's progress discussed with staff in treatment team meeting. Medications, treatment progress and treatment plan reviewed with patient. Legal status: 303 involuntary commitment  Disposition: referrals as indicated         SUBJECTIVE     Over the last 24 hours:  Per nursing note: " Pt visible on unit and denies symptoms and needs. Calm and compliant with unit routines and care. Compliant with medications and states he looks forward to next level of care; awaiting rehab placement "  PRN medications: nicotine gum  Behavior over the last 24 hours: some improvement. The patient was seen and evaluated today for continuity of care. On interview, the patient is sitting comfortably in chair. Patient reports "great" mood.   Patient endorses "alright" energy levels. Patient reports "pretty good. .. for 5-6 hours" sleep. Per patient, appetite is "good". Patient appears more calm and is cooperative with interview. Currently, patient denies auditory hallucinations and denies visual hallucinations. The patient denies current passive or active suicidal ideation, intent, or plan. Patient denies current passive or active homicidal ideation, intent, or plan. Patient reports  tolerating medications well and denies adverse effects at this time. Team discussed treatment plan, to which patient is amenable. Patient does not endorse additional questions or concerns at this time. Participates more in milieu. Participates more often in group therapy. More visible on the unit.     Progress Toward Goals: improving gradually, less anxious, more cooperative, less paranoid, working on coping skills    Review of Systems:  Sleep: normal, improved  Appetite: normal  Medication side effects: No   ROS: reports tiredness, denies any headache, back pain or dizziness, all other systems are negative      OBJECTIVE     Vital signs in last 24 hours:   Temp:  [97.9 °F (36.6 °C)-99.2 °F (37.3 °C)] 97.9 °F (36.6 °C)  HR:  [68-89] 68  Resp:  [16] 16  BP: (120)/(73-76) 120/73    Mental Status Exam:  Appearance: alert, appears stated age, casually dressed, marginal grooming/hygiene and dark hair  Behavior: less anxious, more calm, more cooperative  Motor: no abnormal movements  Gait/Station: normal gait/station, normal balance  Speech: normal rate, normal volume, normal pitch, spontaneous  Mood: "great"  Affect: less labile, less irritable  Thought process: goal directed, normal rate of thoughts  Associations: intact associations  Thought content: mild paranoia  Perceptual disturbances: no auditory hallucinations, no visual hallucinations  Risk potential:   Suicidal ideation - None  Homicidal ideation - None  Potential for aggression - No  Cognition: memory grossly intact and appears to be of average intelligence  Sensorium: oriented to person, place and time/date  Consciousness: alert and awake  Attention/Concentration: attention span and concentration are age appropriate  Insight: Improving  Judgment: Limited     Suicide/Homicide Risk Assessment:  Risk of Harm to Self:   Nursing Suicide Risk Assessment Last 24 hours: C-SSRS Risk (Since Last Contact)  Calculated C-SSRS Risk Score (Since Last Contact): No Risk Indicated  Current Specific Risk Factors include: diagnosis of mood disorder, mental illness diagnosis, history of substance use  Protective Factors: Protective Factors:  The patient has desire to live, The patient does not want to die, The patient has no thoughts of suicide, restricted access to lethal means  Based on today's assessment, Ryan Carvajal presents the following risk of harm to self: low  Risk of Harm to Others:  Nursing Homicide Risk Assessment: Violence Risk to Others: Denies within past 6 months  Current Specific Risk Factors include: multiple stressors, social difficulties  Protective Factors: no current homicidal ideation, improved mood, mood is stabilizing, compliant with medications on the unit as ordered, compliant with unit milieu, follows staff redirection  Based on today's assessment, Ryan Carvajal presents the following risk of harm to others: low  The following interventions are recommended: behavioral checks every 7 minutes, continued hospitalization on locked unit      Nutrition Assessment and Intervention:     Reviewed food recall journal      Emotional and Mental Well-being, Sleep, Connectedness Assessment and Intervention:    Sleep/stress assessment performed      Tobacco and Toxic Substance Assessment and Intervention:     Alcohol and drug use screening performed           ACTIVE MEDICATIONS     Current Medications:  Current Facility-Administered Medications   Medication Dose Route Frequency Provider Last Rate   • acetaminophen  650 mg Oral Q6H PRN Yolanda Hankins DO     • acetaminophen  650 mg Oral Q4H PRN Yasmin Tineo, DO     • acetaminophen  975 mg Oral Q6H PRN [place] Tineo, DO     • aluminum-magnesium hydroxide-simethicone  30 mL Oral Q4H PRN [place] Tineo, DO     • benzocaine   Mucosal 4x Daily PRN Dennis Wray PA-C     • benztropine  1 mg Intramuscular BID PRN Yasmin Tineo, DO     • benztropine  1 mg Oral BID PRN Yasmin Tineo, DO     • hydrOXYzine HCL  50 mg Oral Q6H PRN Max 4/day Yasmin Tineo, DO      Or   • diphenhydrAMINE  50 mg Intramuscular Q6H PRN Yasmin Tineo, DO     • glycerin-hypromellose-  1 drop Both Eyes Q3H PRN [place] Tineo, DO     • hydrOXYzine HCL  100 mg Oral Q6H PRN Max 4/day Yasmin Tineo, DO      Or   • LORazepam  2 mg Intramuscular Q6H PRN Yasmin Tineo, DO     • hydrOXYzine HCL  25 mg Oral Q6H PRN Max 4/day Yasmin Tineo, DO     • melatonin  3 mg Oral HS Renetta Figueroa MD     • nicotine  7 mg Transdermal Daily Dennis Wray PA-C     • nicotine polacrilex  2 mg Oral Q2H PRN MICHELLE Kitchen     • OLANZapine  25 mg Oral HS Inez Roca MD      Or   • OLANZapine  10 mg Intramuscular HS Inez Roca MD     • OLANZapine  10 mg Oral Q3H PRN Max 3/day [place] Tineo, DO      Or   • OLANZapine  10 mg Intramuscular Q3H PRN Max 3/day [place] Tineo, DO     • OLANZapine  5 mg Oral Q3H PRN Max 6/day Yasmin Tineo, DO      Or   • OLANZapine  5 mg Intramuscular Q3H PRN Max 6/day Yasmin Tineo, DO     • OLANZapine  2.5 mg Oral Q3H PRN Max 8/day Yasmin Tineo, DO     • polyethylene glycol  17 g Oral Daily PRN Yasmin Tineo, DO     • propranolol  10 mg Oral Q8H PRN [place] Tineo, DO     • senna-docusate sodium  1 tablet Oral Daily PRN Yasmin Tineo, DO         Behavioral Health Medications: I have personally reviewed all current active medications. Changes as in plan section above.       ADDITIONAL DATA     EKG Results: reviewed  Results for orders placed or performed during the hospital encounter of 09/14/23 (from the past 1000 hour(s))   ECG 12 lead    Collection Time: 09/19/23 12:13 PM   Result Value    Ventricular Rate 58    Atrial Rate 58    ID Interval 154    QRSD Interval 106    QT Interval 406    QTC Interval 398    P Axis 60    QRS Axis 77    T Wave Axis 64    Narrative    Sinus bradycardia  Otherwise normal ECG  When compared with ECG of 19-SEP-2023 12:12, (unconfirmed)  No significant change was found  Confirmed by Joselo Levin ([id number]) on 9/19/2023 5:08:22 PM     *Note: Due to a large number of results and/or encounters for the requested time period, some results have not been displayed. A complete set of results can be found in Results Review. Radiology Results: Reviewed, no new imaging  No results found. No Chest XR results available for this patient. Laboratory Results: I have personally reviewed all pertinent laboratory/tests results  Results from the past 24 hours: No results found for this or any previous visit (from the past 24 hour(s)).       This note was not shared with the patient due to reasonable likelihood of causing patient harm        Tonio Be MD 09/28/23  Department of Psychiatry and [facility]

## 2023-09-28 NOTE — NURSING NOTE
Pt visible on unit and denies symptoms and needs. Calm and compliant with unit routines and care. Compliant with medications and states he looks forward to next level of care; awaiting rehab placement.

## 2023-09-28 NOTE — PLAN OF CARE
Problem: Alteration in Thoughts and Perception  Goal: Treatment Goal: Gain control of psychotic behaviors/thinking, reduce/eliminate presenting symptoms and demonstrate improved reality functioning upon discharge  Outcome: Progressing  Goal: Verbalize thoughts and feelings  Description: Interventions:  - Promote a nonjudgmental and trusting relationship with the patient through active listening and therapeutic communication  - Assess patient's level of functioning, behavior and potential for risk  - Engage patient in 1 on 1 interactions  - Encourage patient to express fears, feelings, frustrations, and discuss symptoms    - [place] patient to reality, help patient recognize reality-based thinking   - Administer medications as ordered and assess for potential side effects  - Provide the patient education related to the signs and symptoms of the illness and desired effects of prescribed medications  Outcome: Progressing  Goal: Refrain from acting on delusional thinking/internal stimuli  Description: Interventions:  - Monitor patient closely, per order   - Utilize least restrictive measures   - Set reasonable limits, give positive feedback for acceptable   - Administer medications as ordered and monitor of potential side effects  Outcome: Progressing  Goal: Agree to be compliant with medication regime, as prescribed and report medication side effects  Description: Interventions:  - Offer appropriate PRN medication and supervise ingestion; conduct AIMS, as needed   Outcome: Progressing  Goal: Attend and participate in unit activities, including therapeutic, recreational, and educational groups  Description: Interventions:  -Encourage Visitation and family involvement in care  Outcome: Progressing  Goal: Recognize dysfunctional thoughts, communicate reality-based thoughts at the time of discharge  Description: Interventions:  - Provide medication and psycho-education to assist patient in compliance and developing insight into his/her illness   Outcome: Progressing  Goal: Complete daily ADLs, including personal hygiene independently, as able  Description: Interventions:  - Observe, teach, and assist patient with ADLS  - Monitor and promote a balance of rest/activity, with adequate nutrition and elimination   Outcome: Progressing     Problem: Ineffective Coping  Goal: Demonstrates healthy coping skills  Outcome: Progressing  Goal: Participates in unit activities  Description: Interventions:  - Provide therapeutic environment   - Provide required programming   - Redirect inappropriate behaviors   Outcome: Progressing     Problem: DISCHARGE PLANNING  Goal: Discharge to home or other facility with appropriate resources  Description: INTERVENTIONS:  - Identify barriers to discharge w/patient and caregiver  - Arrange for needed discharge resources and transportation as appropriate  - Identify discharge learning needs (meds, wound care, etc.)  - Arrange for interpretive services to assist at discharge as needed  - Refer to Case Management Department for coordinating discharge planning if the patient needs post-hospital services based on physician/advanced practitioner order or complex needs related to functional status, cognitive ability, or social support system  Outcome: Progressing     Problem: PSYCHOSIS  Goal: Will report no hallucinations or delusions  Description: Interventions:  - Administer medication as  ordered  - Every waking shifts and PRN assess for the presence of hallucinations and or delusions  - Assist with reality testing to support increasing orientation  - Assess if patient's hallucinations or delusions are encouraging self-harm or harm to others and intervene as appropriate  Outcome: Progressing     Problem: INVOLUNTARY ADMIT  Goal: Will cooperate with staff recommendations and doctor's orders and will demonstrate appropriate behavior  Description: INTERVENTIONS:  - Treat underlying conditions and offer medication as ordered  - Educate regarding involuntary admission procedures and rules  - Utilize positive consistent limit setting strategies to support patient and staff safety  Outcome: Progressing

## 2023-09-28 NOTE — NURSING NOTE
Patient is calm and cooperative upon approach. Patient is visible walking milieu. Patient denies SI/HI/AH/VH. Patient is compliant with meds and meals.

## 2023-09-28 NOTE — PROGRESS NOTES
09/28/23 0844   Team Meeting   Meeting Type Daily Rounds   Team Members Present   Team Members Present Physician;Nurse;   Physician Team Member [id number] Team Member JORGE WILDE [facility] Management Team Member Maria Esther   Patient/Family Present   Patient Present No   Patient's Family Present No     303, slow progress on Zyprexa. Visible, social, attends some groups. Continue med management -Zyperxa at 25 mg. Pt is inpatient rehab bound. Discharge to be determined. Continue to monitor.

## 2023-09-29 PROCEDURE — 99232 SBSQ HOSP IP/OBS MODERATE 35: CPT | Performed by: PSYCHIATRY & NEUROLOGY

## 2023-09-29 RX ADMIN — MELATONIN TAB 3 MG 3 MG: 3 TAB at 21:20

## 2023-09-29 RX ADMIN — NICOTINE POLACRILEX 2 MG: 2 GUM, CHEWING BUCCAL at 15:49

## 2023-09-29 RX ADMIN — OLANZAPINE 25 MG: 10 TABLET, ORALLY DISINTEGRATING ORAL at 21:20

## 2023-09-29 RX ADMIN — NICOTINE POLACRILEX 2 MG: 2 GUM, CHEWING BUCCAL at 13:55

## 2023-09-29 NOTE — PROGRESS NOTES
BEHAVIORAL HEALTH SERVICE PROGRESS NOTE    Fernando Tejada 43 y.o. male MRN: [id number]  Unit/Bed#: [address] 382-02 Encounter: [id number]         ASSESSMENT/PLAN     Fernando Tejada is a 43 y.o. male, who is admitted to [address] on 303 involuntary commitment for unstable mood and psychosis. Currently, no anticipated medication changes today. Principal Problem:    Bipolar I disorder, most recent episode manic, severe with psychotic features ([address])  Active Problems:    Medical clearance for psychiatric admission    Tobacco abuse    Pain, dental      RECOMMENDED TREATMENT     Continue olanzapine 25 mg at bedtime for mood/psychosis (or olanzapine 10 mg IM for medication over objection)  Continue melatonin 3 mg at bedtime for sleep  Continue nicotine 7 mg patch daily for nicotine cravings  All current active medications have been reviewed  Encourage group therapy, milieu therapy and occupational therapy  Behavioral Health checks every 15 minutes    Risks / Benefits of Treatment: Risks, benefits, and possible side effects of medications explained to patient and patient verbalizes understanding and agreement for treatment. Counseling / Coordination of Care: Patient's progress discussed with staff in treatment team meeting. Medications, treatment progress and treatment plan reviewed with patient. Legal status: 303 involuntary commitment  Disposition: referrals as indicated, pending clinical stabilization         SUBJECTIVE     Over the last 24 hours:  Per nursing note: " Pt visible and social on unit. Pleasant on approach and denies symptoms and needs. Awaiting placement. Compliant with unit routines and care."  PRN medications: Nicotine gum  Behavior over the last 24 hours: slowly improving. The patient was seen and evaluated today for continuity of care. On interview, the patient is Laying in bed. Patient reports "Great" mood. Patient endorses "Better" energy levels.  Patient reports "Good" sleep for 4-5 hours without bad dreams. Per patient, appetite is "great". Patient is more pleasant, appears less labile and less irritable. . Currently, patient denies auditory hallucinations and denies visual hallucinations. The patient denies current passive or active suicidal ideation, intent, or plan. Patient denies current passive or active homicidal ideation, intent, or plan. Patient reports  tolerating medications well and denies adverse effects at this time. Team discussed treatment plan, to which patient is amenable. Patient does not endorse additional questions or concerns at this time. Participates appropriately in milieu. Socializes with peers.     Progress Toward Goals: progressing, more cooperative, less irritable, less labile    Review of Systems:  Sleep: improved  Appetite: normal  Medication side effects: No   ROS: reports no complaints, denies any headache, shortness of breath or chest pain, all other systems are negative      OBJECTIVE     Vital signs in last 24 hours:   Temp:  [97.3 °F (36.3 °C)-97.5 °F (36.4 °C)] 97.5 °F (36.4 °C)  HR:  [77-80] 77  Resp:  [16-18] 18  BP: (114-122)/(66-69) 114/66    Mental Status Exam:  Appearance: alert, appears stated age, casually dressed, disheveled and Dark hair  Behavior: cooperative, calm, good eye contact  Motor: no abnormal movements  Gait/Station: in bed  Speech: normal rate, normal volume, normal pitch, fluent  Mood: "Great"  Affect: slightly brighter, less irritable, less labile  Thought process: goal directed, normal rate of thoughts  Associations: intact associations  Thought content: mild paranoia  Perceptual disturbances: no auditory hallucinations, no visual hallucinations, does not appear responding to internal stimuli  Risk potential:   Suicidal ideation - None  Homicidal ideation - None  Potential for aggression - No  Cognition: memory grossly intact and appears to be of average intelligence  Sensorium: oriented to person, place and time/date  Consciousness: alert and awake  Attention/Concentration: attention span and concentration are age appropriate  Insight: Improving  Judgment: Limited     Suicide/Homicide Risk Assessment:  Risk of Harm to Self:   Nursing Suicide Risk Assessment Last 24 hours: C-SSRS Risk (Since Last Contact)  Calculated C-SSRS Risk Score (Since Last Contact): No Risk Indicated  Current Specific Risk Factors include: diagnosis of mood disorder, mental illness diagnosis, lack of support  Protective Factors: Protective Factors:  The patient has desire to live, The patient does not want to die, no current suicidal ideation, ability to communicate with staff on the unit, able to contract for safety on the unit  Based on today's assessment, Ladi Rothman presents the following risk of harm to self: low  Risk of Harm to Others:  Nursing Homicide Risk Assessment: Violence Risk to Others: Denies within past 6 months  Current Specific Risk Factors include: behavior suggesting impulsivity, behavior suggesting loss of control, social difficulties  Protective Factors: no current homicidal ideation, improved impulse control, improved mood, mood is stabilizing, no current psychotic symptoms  Based on today's assessment, Ladi Rothman presents the following risk of harm to others: low  The following interventions are recommended: behavioral checks every 7 minutes, continued hospitalization on locked unit      Nutrition Assessment and Intervention:     Reviewed food recall journal      Emotional and Mental Well-being, Sleep, Connectedness Assessment and Intervention:    Sleep/stress assessment performed           ACTIVE MEDICATIONS     Current Medications:  Current Facility-Administered Medications   Medication Dose Route Frequency Provider Last Rate   • acetaminophen  650 mg Oral Q6H PRN Norm Meline, DO     • acetaminophen  650 mg Oral Q4H PRN Norm Meline, DO     • acetaminophen  975 mg Oral Q6H PRN Norm Meline, DO     • aluminum-magnesium hydroxide-simethicone  30 mL Oral Q4H PRN Corbin Cocks, DO     • benzocaine   Mucosal 4x Daily PRN Sharon Aldana PA-C     • benztropine  1 mg Intramuscular BID PRN Corbin Cocks, DO     • benztropine  1 mg Oral BID PRN Corbin Cocks, DO     • hydrOXYzine HCL  50 mg Oral Q6H PRN Max 4/day Corbin Cocks, DO      Or   • diphenhydrAMINE  50 mg Intramuscular Q6H PRN Corbin Cocks, DO     • glycerin-hypromellose-  1 drop Both Eyes Q3H PRN Corbin Cocks, DO     • hydrOXYzine HCL  100 mg Oral Q6H PRN Max 4/day Corbin Cocks, DO      Or   • LORazepam  2 mg Intramuscular Q6H PRN Corbin Cocks, DO     • hydrOXYzine HCL  25 mg Oral Q6H PRN Max 4/day Corbin Cocks, DO     • melatonin  3 mg Oral HS Selvin Gross MD     • nicotine  7 mg Transdermal Daily Sharon Aldana PA-C     • nicotine polacrilex  2 mg Oral Q2H PRN MICHELLE Kitchen     • OLANZapine  25 mg Oral HS Ashleigh Vasquez MD      Or   • OLANZapine  10 mg Intramuscular HS Ashleigh Vasquez MD     • OLANZapine  10 mg Oral Q3H PRN Max 3/day Corbin Cocks, DO      Or   • OLANZapine  10 mg Intramuscular Q3H PRN Max 3/day Corbin Cocks, DO     • OLANZapine  5 mg Oral Q3H PRN Max 6/day Corbin Cocks, DO      Or   • OLANZapine  5 mg Intramuscular Q3H PRN Max 6/day Corbin Cocks, DO     • OLANZapine  2.5 mg Oral Q3H PRN Max 8/day Corbin Cocks, DO     • polyethylene glycol  17 g Oral Daily PRN Corbin Cocks, DO     • propranolol  10 mg Oral Q8H PRN Corbin Cocks, DO     • senna-docusate sodium  1 tablet Oral Daily PRN Corbin Cocks, DO         Behavioral Health Medications: I have personally reviewed all current active medications. Changes as in plan section above.       ADDITIONAL DATA     EKG Results: reviewed  Results for orders placed or performed during the hospital encounter of 09/14/23 (from the past 1000 hour(s))   ECG 12 lead    Collection Time: 09/19/23 12:13 PM   Result Value    Ventricular Rate 58    Atrial Rate 58    [place] Interval 154    QRSD Interval 106    QT Interval 406 QTC Interval 398    P Axis 60    QRS Axis 77    T Wave Axis 64    Narrative    Sinus bradycardia  Otherwise normal ECG  When compared with ECG of 19-SEP-2023 12:12, (unconfirmed)  No significant change was found  Confirmed by Clearance Cushing ([id number]) on 9/19/2023 5:08:22 PM     *Note: Due to a large number of results and/or encounters for the requested time period, some results have not been displayed. A complete set of results can be found in Results Review. Radiology Results: Reviewed, no new imaging  No results found. No Chest XR results available for this patient. Laboratory Results: I have personally reviewed all pertinent laboratory/tests results  Results from the past 24 hours: No results found for this or any previous visit (from the past 24 hour(s)).       This note was not shared with the patient due to reasonable likelihood of causing patient harm        Jake Hall MD 09/29/23  Department of Psychiatry and [facility]

## 2023-09-29 NOTE — NURSING NOTE
Pt visible and social on unit. Pleasant on approach and denies symptoms and needs. Awaiting placement. Compliant with unit routines and care.

## 2023-09-29 NOTE — CASE MANAGEMENT
Writer sent pt's clinicals to [facility] for inpatient rehab placement. [facility] staff may call unit to speak to patient for phone assessment. Continue to follow up.

## 2023-09-29 NOTE — PROGRESS NOTES
Progress Note - [address] [facility] 43 y.o. male MRN: [id number]   Unit/Bed#: Hal [facility] 382-02 Encounter: [id number]    Behavior over the last 24 hours: improving. Zenaida Jaramillo continues to improve gradually. He is pleasant and cooperative with the session, denies all symptoms. He no longer has pressured speech and no longer seems paranoid. Compliant with medications. Attends group therapy. Socializes more with peers.     Sleep: improved, slept 6 hours  Appetite: normal  Medication side effects: No   ROS: no complaints, denies any shortness of breath, chest pain or abdominal pain, all other systems are negative    Mental Status Evaluation:    Appearance:  casually dressed   Behavior:  pleasant, cooperative, calm   Speech:  normal rate and volume   Mood:  euthymic   Affect:  normal range and intensity, appropriate   Thought Process:  organized, goal directed   Associations: intact associations   Thought Content:  no overt delusions, paranoid ideation is resolved   Perceptual Disturbances: no auditory hallucinations, no visual hallucinations   Risk Potential: Suicidal ideation - None at present  Homicidal ideation - None at present  Potential for aggression - Not at present   Sensorium:  oriented to person, place and time/date   Memory:  recent and remote memory grossly intact   Consciousness:  alert and awake   Attention/Concentration: attention span and concentration are improved   Insight:  improved and moderate   Judgment: improved and moderate   Gait/Station: normal gait/station, normal balance   Motor Activity: no abnormal movements     Vital signs in last 24 hours:    Temp:  [97.9 °F (36.6 °C)-98 °F (36.7 °C)] 97.9 °F (36.6 °C)  HR:  [83-86] 83  Resp:  [16] 16  BP: (101-117)/(68-72) 101/72    Laboratory results: I have personally reviewed all pertinent laboratory/tests results    Results from the past 24 hours: No results found for this or any previous visit (from the past 24 hour(s)). Suicide/Homicide Risk Assessment:    Risk of Harm to Self:   Nursing Suicide Risk Assessment Last 24 hours: C-SSRS Risk (Since Last Contact)  Calculated C-SSRS Risk Score (Since Last Contact): No Risk Indicated  Current Specific Risk Factors include: diagnosis of mood disorder, substance use  Protective Factors: no current suicidal ideation, ability to communicate with staff on the unit, taking medications as ordered on the unit, improved mood  Based on today's assessment, Eliza Vangwen presents the following risk of harm to self: low    Risk of Harm to Others:  Nursing Homicide Risk Assessment: Violence Risk to Others: Denies within past 6 months  Based on today's assessment, Eliza Bryan presents the following risk of harm to others: low    The following interventions are recommended: behavioral checks every 7 minutes, continued hospitalization on locked unit    Progress Toward Goals: progressing, no longer paranoid, working on coping skills, transfer pending to inpatient drug and alcohol rehab, denies current suicidal thoughts    Assessment/Plan   Principal Problem:    Bipolar I disorder, most recent episode manic, severe with psychotic features ([address])  Active Problems:    Medical clearance for psychiatric admission    Tobacco abuse    Pain, dental      Recommended Treatment:     Planned medication and treatment changes:     All current active medications have been reviewed  Encourage group therapy, milieu therapy and occupational therapy  Behavioral Health checks every 7 minutes  Drug and Alcohol rehabilitation placement pending     Continue current medications:    Current Facility-Administered Medications   Medication Dose Route Frequency Provider Last Rate   • acetaminophen  650 mg Oral Q6H PRN Donzell Danker, DO     • acetaminophen  650 mg Oral Q4H PRN Donzell Danker, DO     • acetaminophen  975 mg Oral Q6H PRN Donzell Danker, DO     • aluminum-magnesium hydroxide-simethicone  30 mL Oral Q4H PRN Donzell Danker, DO     • benzocaine   Mucosal 4x Daily PRN Mat Avila PA-C     • benztropine  1 mg Intramuscular BID PRN Yolanda Corrente, DO     • benztropine  1 mg Oral BID PRN Yolanda Corrente, DO     • hydrOXYzine HCL  50 mg Oral Q6H PRN Max 4/day Yolanda Corrente, DO      Or   • diphenhydrAMINE  50 mg Intramuscular Q6H PRN Yolanda Corrente, DO     • glycerin-hypromellose-  1 drop Both Eyes Q3H PRN Yolanda Corrente, DO     • hydrOXYzine HCL  100 mg Oral Q6H PRN Max 4/day Yolanda Corrente, DO      Or   • LORazepam  2 mg Intramuscular Q6H PRN Yolanda Corrente, DO     • hydrOXYzine HCL  25 mg Oral Q6H PRN Max 4/day Yolanda Corrente, DO     • melatonin  3 mg Oral HS Raymond Valentino MD     • nicotine  7 mg Transdermal Daily Mat Avila PA-C     • nicotine polacrilex  2 mg Oral Q2H PRN MICHELLE Kitchen     • OLANZapine  25 mg Oral HS Stoney Mcdonald MD      Or   • OLANZapine  10 mg Intramuscular HS Stoney Mcdonald MD     • OLANZapine  10 mg Oral Q3H PRN Max 3/day Yolanda Corrente, DO      Or   • OLANZapine  10 mg Intramuscular Q3H PRN Max 3/day Yolanda Corrente, DO     • OLANZapine  5 mg Oral Q3H PRN Max 6/day Yolanda Corrente, DO      Or   • OLANZapine  5 mg Intramuscular Q3H PRN Max 6/day Yolanda Corrente, DO     • OLANZapine  2.5 mg Oral Q3H PRN Max 8/day Yolanda Corrente, DO     • polyethylene glycol  17 g Oral Daily PRN Yolanda Corrente, DO     • propranolol  10 mg Oral Q8H PRN Yolanda Corrente, DO     • senna-docusate sodium  1 tablet Oral Daily PRN Yolanda Corrente, DO       Risks / Benefits of Treatment:    Risks, benefits, and possible side effects of medications explained to patient and patient verbalizes understanding and agreement for treatment. Counseling / Coordination of Care:    Patient's progress discussed with staff in treatment team meeting. Medications, treatment progress and treatment plan reviewed with patient.     Stoney Mcdonald MD 09/30/23

## 2023-09-29 NOTE — PROGRESS NOTES
09/29/23 0848   Team Meeting   Meeting Type Daily Rounds   Team Members Present   Team Members Present Physician;Nurse;   Physician Team Member Marquita   Nursing Team Member [facility] Management Team Member Maria Esther   Patient/Family Present   Patient Present No   Patient's Family Present No      303, pt less disorganized. Compliant with meds and meals. Continue Zyprexa. Make HOST referral. Bed search early next week. D/c to be determined.

## 2023-09-29 NOTE — PLAN OF CARE
Problem: Alteration in Thoughts and Perception  Goal: Treatment Goal: Gain control of psychotic behaviors/thinking, reduce/eliminate presenting symptoms and demonstrate improved reality functioning upon discharge  Outcome: Progressing  Goal: Verbalize thoughts and feelings  Description: Interventions:  - Promote a nonjudgmental and trusting relationship with the patient through active listening and therapeutic communication  - Assess patient's level of functioning, behavior and potential for risk  - Engage patient in 1 on 1 interactions  - Encourage patient to express fears, feelings, frustrations, and discuss symptoms    - [place] patient to reality, help patient recognize reality-based thinking   - Administer medications as ordered and assess for potential side effects  - Provide the patient education related to the signs and symptoms of the illness and desired effects of prescribed medications  Outcome: Progressing  Goal: Refrain from acting on delusional thinking/internal stimuli  Description: Interventions:  - Monitor patient closely, per order   - Utilize least restrictive measures   - Set reasonable limits, give positive feedback for acceptable   - Administer medications as ordered and monitor of potential side effects  Outcome: Progressing  Goal: Agree to be compliant with medication regime, as prescribed and report medication side effects  Description: Interventions:  - Offer appropriate PRN medication and supervise ingestion; conduct AIMS, as needed   Outcome: Progressing  Goal: Attend and participate in unit activities, including therapeutic, recreational, and educational groups  Description: Interventions:  -Encourage Visitation and family involvement in care  Outcome: Progressing  Goal: Recognize dysfunctional thoughts, communicate reality-based thoughts at the time of discharge  Description: Interventions:  - Provide medication and psycho-education to assist patient in compliance and developing insight into his/her illness   Outcome: Progressing  Goal: Complete daily ADLs, including personal hygiene independently, as able  Description: Interventions:  - Observe, teach, and assist patient with ADLS  - Monitor and promote a balance of rest/activity, with adequate nutrition and elimination   Outcome: Progressing     Problem: Ineffective Coping  Goal: Demonstrates healthy coping skills  Outcome: Progressing  Goal: Participates in unit activities  Description: Interventions:  - Provide therapeutic environment   - Provide required programming   - Redirect inappropriate behaviors   Outcome: Progressing     Problem: DISCHARGE PLANNING  Goal: Discharge to home or other facility with appropriate resources  Description: INTERVENTIONS:  - Identify barriers to discharge w/patient and caregiver  - Arrange for needed discharge resources and transportation as appropriate  - Identify discharge learning needs (meds, wound care, etc.)  - Arrange for interpretive services to assist at discharge as needed  - Refer to Case Management Department for coordinating discharge planning if the patient needs post-hospital services based on physician/advanced practitioner order or complex needs related to functional status, cognitive ability, or social support system  Outcome: Progressing     Problem: PSYCHOSIS  Goal: Will report no hallucinations or delusions  Description: Interventions:  - Administer medication as  ordered  - Every waking shifts and PRN assess for the presence of hallucinations and or delusions  - Assist with reality testing to support increasing orientation  - Assess if patient's hallucinations or delusions are encouraging self-harm or harm to others and intervene as appropriate  Outcome: Progressing     Problem: INVOLUNTARY ADMIT  Goal: Will cooperate with staff recommendations and doctor's orders and will demonstrate appropriate behavior  Description: INTERVENTIONS:  - Treat underlying conditions and offer medication as ordered  - Educate regarding involuntary admission procedures and rules  - Utilize positive consistent limit setting strategies to support patient and staff safety  Outcome: Progressing

## 2023-09-30 PROCEDURE — 99232 SBSQ HOSP IP/OBS MODERATE 35: CPT | Performed by: PSYCHIATRY & NEUROLOGY

## 2023-09-30 RX ADMIN — MELATONIN TAB 3 MG 3 MG: 3 TAB at 21:21

## 2023-09-30 RX ADMIN — NICOTINE POLACRILEX 2 MG: 2 GUM, CHEWING BUCCAL at 19:46

## 2023-09-30 RX ADMIN — NICOTINE POLACRILEX 2 MG: 2 GUM, CHEWING BUCCAL at 10:28

## 2023-09-30 RX ADMIN — OLANZAPINE 25 MG: 10 TABLET, ORALLY DISINTEGRATING ORAL at 21:21

## 2023-09-30 NOTE — PLAN OF CARE
Problem: Alteration in Thoughts and Perception  Goal: Treatment Goal: Gain control of psychotic behaviors/thinking, reduce/eliminate presenting symptoms and demonstrate improved reality functioning upon discharge  Outcome: Progressing  Goal: Verbalize thoughts and feelings  Description: Interventions:  - Promote a nonjudgmental and trusting relationship with the patient through active listening and therapeutic communication  - Assess patient's level of functioning, behavior and potential for risk  - Engage patient in 1 on 1 interactions  - Encourage patient to express fears, feelings, frustrations, and discuss symptoms    - [place] patient to reality, help patient recognize reality-based thinking   - Administer medications as ordered and assess for potential side effects  - Provide the patient education related to the signs and symptoms of the illness and desired effects of prescribed medications  Outcome: Progressing  Goal: Refrain from acting on delusional thinking/internal stimuli  Description: Interventions:  - Monitor patient closely, per order   - Utilize least restrictive measures   - Set reasonable limits, give positive feedback for acceptable   - Administer medications as ordered and monitor of potential side effects  Outcome: Progressing  Goal: Agree to be compliant with medication regime, as prescribed and report medication side effects  Description: Interventions:  - Offer appropriate PRN medication and supervise ingestion; conduct AIMS, as needed   Outcome: Progressing  Goal: Attend and participate in unit activities, including therapeutic, recreational, and educational groups  Description: Interventions:  -Encourage Visitation and family involvement in care  Outcome: Progressing  Goal: Recognize dysfunctional thoughts, communicate reality-based thoughts at the time of discharge  Description: Interventions:  - Provide medication and psycho-education to assist patient in compliance and developing insight into his/her illness   Outcome: Progressing  Goal: Complete daily ADLs, including personal hygiene independently, as able  Description: Interventions:  - Observe, teach, and assist patient with ADLS  - Monitor and promote a balance of rest/activity, with adequate nutrition and elimination   Outcome: Progressing     Problem: Ineffective Coping  Goal: Demonstrates healthy coping skills  Outcome: Progressing  Goal: Participates in unit activities  Description: Interventions:  - Provide therapeutic environment   - Provide required programming   - Redirect inappropriate behaviors   Outcome: Progressing     Problem: DISCHARGE PLANNING  Goal: Discharge to home or other facility with appropriate resources  Description: INTERVENTIONS:  - Identify barriers to discharge w/patient and caregiver  - Arrange for needed discharge resources and transportation as appropriate  - Identify discharge learning needs (meds, wound care, etc.)  - Arrange for interpretive services to assist at discharge as needed  - Refer to Case Management Department for coordinating discharge planning if the patient needs post-hospital services based on physician/advanced practitioner order or complex needs related to functional status, cognitive ability, or social support system  Outcome: Progressing     Problem: PSYCHOSIS  Goal: Will report no hallucinations or delusions  Description: Interventions:  - Administer medication as  ordered  - Every waking shifts and PRN assess for the presence of hallucinations and or delusions  - Assist with reality testing to support increasing orientation  - Assess if patient's hallucinations or delusions are encouraging self-harm or harm to others and intervene as appropriate  Outcome: Progressing     Problem: INVOLUNTARY ADMIT  Goal: Will cooperate with staff recommendations and doctor's orders and will demonstrate appropriate behavior  Description: INTERVENTIONS:  - Treat underlying conditions and offer medication as ordered  - Educate regarding involuntary admission procedures and rules  - Utilize positive consistent limit setting strategies to support patient and staff safety  Outcome: Progressing

## 2023-09-30 NOTE — NURSING NOTE
Pt visible on unit walking in halls. Pleasant on approach and denies symptoms and needs. Continues to await placement at rehab and states he is hoping it will be soon, looking forward to moving on. Calm and compliant with unit routines and care. Safety checks continue.

## 2023-10-01 PROCEDURE — 99232 SBSQ HOSP IP/OBS MODERATE 35: CPT | Performed by: STUDENT IN AN ORGANIZED HEALTH CARE EDUCATION/TRAINING PROGRAM

## 2023-10-01 RX ADMIN — MELATONIN TAB 3 MG 3 MG: 3 TAB at 21:06

## 2023-10-01 RX ADMIN — OLANZAPINE 25 MG: 10 TABLET, ORALLY DISINTEGRATING ORAL at 21:06

## 2023-10-01 RX ADMIN — NICOTINE POLACRILEX 2 MG: 2 GUM, CHEWING BUCCAL at 15:31

## 2023-10-01 RX ADMIN — NICOTINE POLACRILEX 2 MG: 2 GUM, CHEWING BUCCAL at 18:55

## 2023-10-01 RX ADMIN — NICOTINE POLACRILEX 2 MG: 2 GUM, CHEWING BUCCAL at 08:47

## 2023-10-01 RX ADMIN — ACETAMINOPHEN 650 MG: 325 TABLET ORAL at 18:15

## 2023-10-01 NOTE — PLAN OF CARE
Problem: Alteration in Thoughts and Perception  Goal: Treatment Goal: Gain control of psychotic behaviors/thinking, reduce/eliminate presenting symptoms and demonstrate improved reality functioning upon discharge  Outcome: Progressing  Goal: Verbalize thoughts and feelings  Description: Interventions:  - Promote a nonjudgmental and trusting relationship with the patient through active listening and therapeutic communication  - Assess patient's level of functioning, behavior and potential for risk  - Engage patient in 1 on 1 interactions  - Encourage patient to express fears, feelings, frustrations, and discuss symptoms    - [place] patient to reality, help patient recognize reality-based thinking   - Administer medications as ordered and assess for potential side effects  - Provide the patient education related to the signs and symptoms of the illness and desired effects of prescribed medications  Outcome: Progressing  Goal: Refrain from acting on delusional thinking/internal stimuli  Description: Interventions:  - Monitor patient closely, per order   - Utilize least restrictive measures   - Set reasonable limits, give positive feedback for acceptable   - Administer medications as ordered and monitor of potential side effects  Outcome: Progressing  Goal: Agree to be compliant with medication regime, as prescribed and report medication side effects  Description: Interventions:  - Offer appropriate PRN medication and supervise ingestion; conduct AIMS, as needed   Outcome: Progressing  Goal: Attend and participate in unit activities, including therapeutic, recreational, and educational groups  Description: Interventions:  -Encourage Visitation and family involvement in care  Outcome: Not Progressing  Goal: Recognize dysfunctional thoughts, communicate reality-based thoughts at the time of discharge  Description: Interventions:  - Provide medication and psycho-education to assist patient in compliance and developing insight into his/her illness   Outcome: Progressing  Goal: Complete daily ADLs, including personal hygiene independently, as able  Description: Interventions:  - Observe, teach, and assist patient with ADLS  - Monitor and promote a balance of rest/activity, with adequate nutrition and elimination   Outcome: Progressing     Problem: Ineffective Coping  Goal: Demonstrates healthy coping skills  Outcome: Progressing  Goal: Participates in unit activities  Description: Interventions:  - Provide therapeutic environment   - Provide required programming   - Redirect inappropriate behaviors   Outcome: Not Progressing     Problem: PSYCHOSIS  Goal: Will report no hallucinations or delusions  Description: Interventions:  - Administer medication as  ordered  - Every waking shifts and PRN assess for the presence of hallucinations and or delusions  - Assist with reality testing to support increasing orientation  - Assess if patient's hallucinations or delusions are encouraging self-harm or harm to others and intervene as appropriate  Outcome: Progressing     Problem: INVOLUNTARY ADMIT  Goal: Will cooperate with staff recommendations and doctor's orders and will demonstrate appropriate behavior  Description: INTERVENTIONS:  - Treat underlying conditions and offer medication as ordered  - Educate regarding involuntary admission procedures and rules  - Utilize positive consistent limit setting strategies to support patient and staff safety  Outcome: Progressing

## 2023-10-01 NOTE — NURSING NOTE
Pt is pleasant and cooperative. Pt is med complaint. Pt noted in bedroom. Pt denies depression and anxiety. Pt denies SI/HI and AH/VH. No unmet needs reported. Will continue to monitor.

## 2023-10-01 NOTE — NURSING NOTE
Patient is visible walking the milieu and in dayroom, socializing with peers. Patient is calm and cooperative upon approach. Patient denies SI/HI/AH/VH.  Patient is compliant with meds and meals

## 2023-10-01 NOTE — PROGRESS NOTES
Progress Note - Humberto Krishna 43 y.o. male MRN: [id number]  Unit/Bed#: -02 Encounter: [id number]    Assessment/Plan   Principal Problem:    Bipolar I disorder, most recent episode manic, severe with psychotic features ([address])  Active Problems:    Medical clearance for psychiatric admission    Tobacco abuse    Pain, dental    Recommended Treatment:   Continue medications at current doses as below. Encourage group therapy, milieu therapy and occupational therapy  All current active medications have been reviewed  Encourage group therapy, milieu therapy and occupational therapy  Behavioral Health checks every 7 minutes    ----------------------------------------      Subjective:   Per nursing report, patient has been doing well on the unit. He has been calm and cooperative with staff. He remains medication compliant. He has been without any acute behavioral issues. Patient reports feeling well today. He is pleasant and appropriate throughout interview. He reports that medications have been helpful. He denies any problems tolerating these and notes that his sleep and appetite have been fair. He is calm and organized throughout interview and does not demonstrate any overt delusional thought content. He denies any SI, HI, or AVH. He denies questions or concerns at this time.     Behavior over the last 24 hours:  unchanged  Sleep: normal  Appetite: normal  Medication side effects: No  ROS: no complaints and all other systems are negative    Mental Status Evaluation:  Appearance:  dressed appropriately and adequately groomed   Behavior:  calm, pleasant and cooperative   Speech:  normal rate and volume   Mood:  "Good"   Affect:  normal range and intensity, appropriate   Thought Process:  linear and goal directed   Associations: intact associations   Thought Content:  no overt delusions   Perceptual Disturbances: denies auditory or visual hallucinations when asked, does not appear responding to internal stimuli   Risk Potential: Suicidal ideation - None  Homicidal ideation - None  Potential for aggression - Not at present   Sensorium:  oriented to person, place and time/date   Memory:  recent and remote memory grossly intact   Consciousness:  alert and awake   Attention/Concentration: attention span and concentration are age appropriate   Insight:  limited   Judgment: limited   Gait/Station: normal gait/station, normal balance   Motor Activity: no abnormal movements     Medications: all current active meds have been reviewed.   Current Facility-Administered Medications   Medication Dose Route Frequency Provider Last Rate   • acetaminophen  650 mg Oral Q6H PRN William Bellow, DO     • acetaminophen  650 mg Oral Q4H PRN William Bellow, DO     • acetaminophen  975 mg Oral Q6H PRN William Bellow, DO     • aluminum-magnesium hydroxide-simethicone  30 mL Oral Q4H PRN William Bellow, DO     • benzocaine   Mucosal 4x Daily PRN Anna Rodriguez PA-C     • benztropine  1 mg Intramuscular BID PRN William Bellow, DO     • benztropine  1 mg Oral BID PRN William Bellow, DO     • hydrOXYzine HCL  50 mg Oral Q6H PRN Max 4/day William Bellow, DO      Or   • diphenhydrAMINE  50 mg Intramuscular Q6H PRN William Bellow, DO     • glycerin-hypromellose-  1 drop Both Eyes Q3H PRN William Bellow, DO     • hydrOXYzine HCL  100 mg Oral Q6H PRN Max 4/day William Bellow, DO      Or   • LORazepam  2 mg Intramuscular Q6H PRN William Bellow, DO     • hydrOXYzine HCL  25 mg Oral Q6H PRN Max 4/day Jen Villegas DO     • melatonin  3 mg Oral HS Antione Leonard MD     • nicotine  7 mg Transdermal Daily Anna Rodriguez PA-C     • nicotine polacrilex  2 mg Oral Q2H PRN MICHELLE Kitchen     • OLANZapine  25 mg Oral HS Sabina Zhao MD      Or   • OLANZapine  10 mg Intramuscular HS Sabina Zhao MD     • OLANZapine  10 mg Oral Q3H PRN Max 3/day William Bellow, DO      Or   • OLANZapine  10 mg Intramuscular Q3H PRN Max 3/day Quang Baer,      • OLANZapine  5 mg Oral Q3H PRN Max 6/day Quang Baer DO      Or   • OLANZapine  5 mg Intramuscular Q3H PRN Max 6/day Quang Baer,      • OLANZapine  2.5 mg Oral Q3H PRN Max 8/day Quang Baer DO     • polyethylene glycol  17 g Oral Daily PRN Quang Baer DO     • propranolol  10 mg Oral Q8H PRN Quang Baer DO     • senna-docusate sodium  1 tablet Oral Daily PRN Quang Baer DO         Labs: I have personally reviewed all pertinent laboratory/tests results  Most Recent Labs:   Lab Results   Component Value Date    WBC 5.93 09/20/2023    RBC 4.98 09/20/2023    HGB 14.9 09/20/2023    HCT 43.1 09/20/2023     09/20/2023    RDW 12.7 09/20/2023    NEUTROABS 2.96 09/20/2023    SODIUM 140 09/15/2023    K 4.2 09/15/2023     09/15/2023    CO2 27 09/15/2023    BUN 13 09/15/2023    CREATININE 0.75 09/15/2023    GLUC 84 09/15/2023    CALCIUM 9.2 09/15/2023    AST 26 09/15/2023    ALT 13 09/15/2023    ALKPHOS 43 09/15/2023    TP 6.6 09/15/2023    ALB 4.1 09/15/2023    TBILI 0.49 09/15/2023    CHOLESTEROL 135 09/15/2023    HDL 38 (L) 09/15/2023    TRIG 125 09/15/2023    LDLCALC 72 09/15/2023    NONHDLC 97 09/15/2023    VALPROICTOT 31.0 (L) 12/07/2021    ZDY2FFICRYWX 0.413 (L) 09/15/2023    FREET4 1.05 09/15/2023    SYPHILISAB Non-reactive 09/15/2023    HGBA1C 6.0 (H) 09/15/2023     09/15/2023       Progress Toward Goals: progressing    Risks / Benefits of Treatment:    Risks, benefits, and possible side effects of medications explained to patient and patient verbalizes understanding and agreement for treatment. Counseling / Coordination of Care:    Patient's progress discussed with staff in treatment team meeting. Medications, treatment progress and treatment plan reviewed with patient.     Genet Alcantara MD 10/01/23

## 2023-10-02 PROCEDURE — 99232 SBSQ HOSP IP/OBS MODERATE 35: CPT | Performed by: PSYCHIATRY & NEUROLOGY

## 2023-10-02 RX ADMIN — MELATONIN TAB 3 MG 3 MG: 3 TAB at 20:49

## 2023-10-02 RX ADMIN — OLANZAPINE 25 MG: 10 TABLET, ORALLY DISINTEGRATING ORAL at 20:49

## 2023-10-02 RX ADMIN — NICOTINE POLACRILEX 2 MG: 2 GUM, CHEWING BUCCAL at 20:49

## 2023-10-02 RX ADMIN — NICOTINE POLACRILEX 2 MG: 2 GUM, CHEWING BUCCAL at 08:18

## 2023-10-02 NOTE — PLAN OF CARE
Problem: Alteration in Thoughts and Perception  Goal: Treatment Goal: Gain control of psychotic behaviors/thinking, reduce/eliminate presenting symptoms and demonstrate improved reality functioning upon discharge  Outcome: Progressing  Goal: Verbalize thoughts and feelings  Description: Interventions:  - Promote a nonjudgmental and trusting relationship with the patient through active listening and therapeutic communication  - Assess patient's level of functioning, behavior and potential for risk  - Engage patient in 1 on 1 interactions  - Encourage patient to express fears, feelings, frustrations, and discuss symptoms    - [place] patient to reality, help patient recognize reality-based thinking   - Administer medications as ordered and assess for potential side effects  - Provide the patient education related to the signs and symptoms of the illness and desired effects of prescribed medications  Outcome: Progressing  Goal: Refrain from acting on delusional thinking/internal stimuli  Description: Interventions:  - Monitor patient closely, per order   - Utilize least restrictive measures   - Set reasonable limits, give positive feedback for acceptable   - Administer medications as ordered and monitor of potential side effects  Outcome: Progressing  Goal: Agree to be compliant with medication regime, as prescribed and report medication side effects  Description: Interventions:  - Offer appropriate PRN medication and supervise ingestion; conduct AIMS, as needed   Outcome: Progressing  Goal: Recognize dysfunctional thoughts, communicate reality-based thoughts at the time of discharge  Description: Interventions:  - Provide medication and psycho-education to assist patient in compliance and developing insight into his/her illness   Outcome: Progressing  Goal: Complete daily ADLs, including personal hygiene independently, as able  Description: Interventions:  - Observe, teach, and assist patient with ADLS  - Monitor and promote a balance of rest/activity, with adequate nutrition and elimination   Outcome: Progressing     Problem: Ineffective Coping  Goal: Demonstrates healthy coping skills  Outcome: Progressing     Problem: DISCHARGE PLANNING  Goal: Discharge to home or other facility with appropriate resources  Description: INTERVENTIONS:  - Identify barriers to discharge w/patient and caregiver  - Arrange for needed discharge resources and transportation as appropriate  - Identify discharge learning needs (meds, wound care, etc.)  - Arrange for interpretive services to assist at discharge as needed  - Refer to Case Management Department for coordinating discharge planning if the patient needs post-hospital services based on physician/advanced practitioner order or complex needs related to functional status, cognitive ability, or social support system  Outcome: Progressing     Problem: PSYCHOSIS  Goal: Will report no hallucinations or delusions  Description: Interventions:  - Administer medication as  ordered  - Every waking shifts and PRN assess for the presence of hallucinations and or delusions  - Assist with reality testing to support increasing orientation  - Assess if patient's hallucinations or delusions are encouraging self-harm or harm to others and intervene as appropriate  Outcome: Progressing     Problem: INVOLUNTARY ADMIT  Goal: Will cooperate with staff recommendations and doctor's orders and will demonstrate appropriate behavior  Description: INTERVENTIONS:  - Treat underlying conditions and offer medication as ordered  - Educate regarding involuntary admission procedures and rules  - Utilize positive consistent limit setting strategies to support patient and staff safety  Outcome: Progressing

## 2023-10-02 NOTE — PROGRESS NOTES
BEHAVIORAL HEALTH SERVICE PROGRESS NOTE    Umu Arrieta 43 y.o. male MRN: [id number]  Unit/Bed#: [address] 382-02 Encounter: [id number]         ASSESSMENT/PLAN     Umu Arrieta is a 43 y.o. male, who is admitted to [address] on  303 involuntary commitment for unstable mood and psychosis. Currently, no anticipated medication changes. Principal Problem:    Bipolar I disorder, most recent episode manic, severe with psychotic features ([address])  Active Problems:    Medical clearance for psychiatric admission    Tobacco abuse    Pain, dental      RECOMMENDED TREATMENT     Continue current regimen:  Melatonin 3 mg at bedtime for sleep  Nicotine patch 7 mg daily for nicotine craving  Olanzapine 25 mg at bedtime for mood/psychosis (olanzapine 10 mg IM daily at bedtime for medication over objection)  All current active medications have been reviewed  Encourage group therapy, milieu therapy and occupational therapy  Behavioral Health checks every 15 minutes    Risks / Benefits of Treatment: Risks, benefits, and possible side effects of medications explained to patient and patient verbalizes understanding and agreement for treatment. Counseling / Coordination of Care: Patient's progress discussed with staff in treatment team meeting. Medications, treatment progress and treatment plan reviewed with patient. Discussed with patient acceptance of mental illness diagnosis and need for ongoing treatment after discharge. Reassurance and supportive therapy provided. Legal status:  303 involuntary commitment  Disposition: referrals as indicated         SUBJECTIVE     Over the last 24 hours:  Per nursing note: " Pt is pleasant and cooperative. Pt is med compliant. Pt denies all psych symptoms. Pt reports being in a good mood. No unmet needs reported. Will continue to monitor. "  PRN medications: Tylenol 650 mg (10/1 1815), nicotine gum  Behavior over the last 24 hours: unchanged.      The patient was seen and evaluated today for continuity of care. On interview, the patient is sitting comfortably in chair. He was previously seen playing [employer] in the group room with himself. Patient reports "great" mood. Patient endorses "better" energy levels. He shares the events leading up to this admission. Patient reports "back to normal.. 6-8 hours" sleep. Per patient, appetite is "better". Patient reports feeling much better due to improvement in sleep and diet. Currently, patient denies auditory hallucinations and denies visual hallucinations. The patient denies current passive or active suicidal ideation, intent, or plan. Patient denies current passive or active homicidal ideation, intent, or plan. Patient reports  tolerating medications well and denies adverse effects at this time. Team discussed treatment plan, to which patient is amenable. Patient does not endorse additional questions or concerns at this time. Attends some groups. Socializes more with peers.     Progress Toward Goals: progressing, improving, less anxious, working on accepting mental illness diagnosis    Review of Systems:  Sleep: normal  Appetite: normal  Medication side effects: No   ROS: reports no complaints, denies any headache, shortness of breath, chest pain or dizziness, all other systems are negative      OBJECTIVE     Vital signs in last 24 hours:   Temp:  [97.6 °F (36.4 °C)-97.9 °F (36.6 °C)] 97.6 °F (36.4 °C)  HR:  [74-78] 74  Resp:  [16] 16  BP: (104-116)/(66-69) 104/66    Mental Status Exam:  Appearance: alert, appears stated age, casually dressed, appropriate grooming and hygiene, dark hair and smiling, headphones around neck  Behavior: cooperative, calm, more pleasant  Motor: no abnormal movements  Gait/Station: normal gait/station, normal balance  Speech: normal rate, normal volume, normal pitch, spontaneous, fluent  Mood: "great"  Affect: brighter, slightly more reactive  Thought process: logical, goal directed, normal rate of thoughts  Associations: circumstantial associations  Thought content: no overt delusions  Perceptual disturbances: denies auditory hallucinations when asked, denies visual hallucinations when asked, does not appear responding to internal stimuli  Risk potential:   Suicidal ideation - None  Homicidal ideation - None  Potential for aggression -  Low  Cognition: memory grossly intact and appears to be of average intelligence  Sensorium: oriented to person, place and time/date  Consciousness: alert and awake  Attention/Concentration: attention span and concentration are age appropriate  Insight: Improving  Judgment: Improving     Suicide/Homicide Risk Assessment:  Risk of Harm to Self:   Nursing Suicide Risk Assessment Last 24 hours: C-SSRS Risk (Since Last Contact)  Calculated C-SSRS Risk Score (Since Last Contact): No Risk Indicated  Current Specific Risk Factors include: diagnosis of mood disorder, substance use  Protective Factors: Protective Factors:  The patient has desire to live, The patient does not want to die, The patient has no thoughts of suicide, no current suicidal ideation, ability to adapt to change, ability to manage anger well, ability to make plans for the future, improved mood, responds to redirection, compliant with medications, compliant with mental health treatment, restricted access to lethal means  Based on today's assessment, Yannick Waller presents the following risk of harm to self: low  Risk of Harm to Others:  Nursing Homicide Risk Assessment: Violence Risk to Others: Denies within past 6 months  Current Specific Risk Factors include: multiple stressors, social difficulties  Protective Factors: no current homicidal ideation, improved impulse control, improved mood, no current psychotic symptoms, compliant with unit milieu, follows staff redirection  Based on today's assessment, Yannick Waller presents the following risk of harm to others: low  The following interventions are recommended: behavioral checks every 7 minutes, continued hospitalization on locked unit      Nutrition Assessment and Intervention:     Reviewed food recall journal      Physical Activity Assessment and Intervention:    Activity journal reviewed      Emotional and Mental Well-being, Sleep, Connectedness Assessment and Intervention:    Sleep/stress assessment performed      Tobacco and Toxic Substance Assessment and Intervention:     Alcohol and drug use screening performed      Therapeutic Lifestyle Change Visit:     One-on-one comprehensive counseling, coaching, and health behavior change visit completed           ACTIVE MEDICATIONS     Current Medications:  Current Facility-Administered Medications   Medication Dose Route Frequency Provider Last Rate   • acetaminophen  650 mg Oral Q6H PRN Pike Ratel, DO     • acetaminophen  650 mg Oral Q4H PRN Pike Ratel, DO     • acetaminophen  975 mg Oral Q6H PRN Pike Ratel, DO     • aluminum-magnesium hydroxide-simethicone  30 mL Oral Q4H PRN Pike Ratel, DO     • benzocaine   Mucosal 4x Daily PRN Дмитрий Bey PA-C     • benztropine  1 mg Intramuscular BID PRN Pike Ratel, DO     • benztropine  1 mg Oral BID PRN Pike Ratel, DO     • hydrOXYzine HCL  50 mg Oral Q6H PRN Max 4/day Pike Ratel, DO      Or   • diphenhydrAMINE  50 mg Intramuscular Q6H PRN Pike Ratel, DO     • glycerin-hypromellose-  1 drop Both Eyes Q3H PRN Pike Ratel, DO     • hydrOXYzine HCL  100 mg Oral Q6H PRN Max 4/day Pike Ratel, DO      Or   • LORazepam  2 mg Intramuscular Q6H PRN Pike Ratel, DO     • hydrOXYzine HCL  25 mg Oral Q6H PRN Max 4/day Jen Villegas DO     • melatonin  3 mg Oral HS Ashli Dyson MD     • nicotine  7 mg Transdermal Daily Дмитрий Bey PA-C     • nicotine polacrilex  2 mg Oral Q2H PRN MICHELLE Kitchen     • OLANZapine  25 mg Oral HS Barb Alberto MD      Or   • OLANZapine  10 mg Intramuscular HS Barb Alberto MD     • OLANZapine  10 mg Oral Q3H PRN Max 3/day Eli Remedios, DO      Or   • OLANZapine  10 mg Intramuscular Q3H PRN Max 3/day Eli Remedios, DO     • OLANZapine  5 mg Oral Q3H PRN Max 6/day Eli Remedios, DO      Or   • OLANZapine  5 mg Intramuscular Q3H PRN Max 6/day Eli Remedios, DO     • OLANZapine  2.5 mg Oral Q3H PRN Max 8/day Eli Remedios, DO     • polyethylene glycol  17 g Oral Daily PRN Eli Remedios, DO     • propranolol  10 mg Oral Q8H PRN Eli Remedios, DO     • senna-docusate sodium  1 tablet Oral Daily PRN Eli Remedios, DO         Behavioral Health Medications: I have personally reviewed all current active medications. Changes as in plan section above. ADDITIONAL DATA     EKG Results: reviewed  Results for orders placed or performed during the hospital encounter of 09/14/23 (from the past 1000 hour(s))   ECG 12 lead    Collection Time: 09/19/23 12:13 PM   Result Value    Ventricular Rate 58    Atrial Rate 58    MD Interval 154    QRSD Interval 106    QT Interval 406    QTC Interval 398    P Axis 60    QRS Axis 77    T Wave Axis 64    Narrative    Sinus bradycardia  Otherwise normal ECG  When compared with ECG of 19-SEP-2023 12:12, (unconfirmed)  No significant change was found  Confirmed by Shanelle Blunt ([id number]) on 9/19/2023 5:08:22 PM     *Note: Due to a large number of results and/or encounters for the requested time period, some results have not been displayed. A complete set of results can be found in Results Review. Radiology Results: Reviewed, no new imaging  No results found. No Chest XR results available for this patient. Laboratory Results: I have personally reviewed all pertinent laboratory/tests results  Results from the past 24 hours: No results found for this or any previous visit (from the past 24 hour(s)).       This note was not shared with the patient due to reasonable likelihood of causing patient harm        Gurmeet Pulliam MD 10/02/23  Department of Psychiatry and [address] [place] [address] Network

## 2023-10-02 NOTE — CASE MANAGEMENT
Yuri called [facility] and spoke to Steven Ennis, who advised that their staff is working on securing funding for inpatient rehab. Will get back to the writer with updated today or tomorrow. Yuri to follow up.

## 2023-10-02 NOTE — NURSING NOTE
Patient is visible in the dayroom, socializing with peers. Patient is calm and cooperative upon approach. Patient denies SI/HI/AH/VH. Patient is compliant with meds and meals.

## 2023-10-02 NOTE — PROGRESS NOTES
10/02/23 0836   Team Meeting   Meeting Type Daily Rounds   Team Members Present   Team Members Present Physician;Nurse;   Physician Team Member Carly Lopez / Shin Freeman Team Member JORGE WILDE [facility] Management Team Member Maria Esther   Patient/Family Present   Patient Present No   Patient's Family Present No     303, calm and cooperative. pt denies symptoms. Compliant with meds. Host referral completed -funding is being secured and rehab bed search on. Tentative d/c Tuesday/Wed. Continue to monitor.

## 2023-10-02 NOTE — NURSING NOTE
Pt is pleasant and cooperative. Pt is med compliant. Pt denies all psych symptoms. Pt reports being in a good mood. No unmet needs reported. Will continue to monitor.

## 2023-10-03 PROCEDURE — 99232 SBSQ HOSP IP/OBS MODERATE 35: CPT | Performed by: PSYCHIATRY & NEUROLOGY

## 2023-10-03 RX ADMIN — NICOTINE POLACRILEX 2 MG: 2 GUM, CHEWING BUCCAL at 11:45

## 2023-10-03 RX ADMIN — NICOTINE POLACRILEX 2 MG: 2 GUM, CHEWING BUCCAL at 16:00

## 2023-10-03 RX ADMIN — OLANZAPINE 25 MG: 10 TABLET, ORALLY DISINTEGRATING ORAL at 21:20

## 2023-10-03 RX ADMIN — MELATONIN TAB 3 MG 3 MG: 3 TAB at 21:20

## 2023-10-03 RX ADMIN — NICOTINE POLACRILEX 2 MG: 2 GUM, CHEWING BUCCAL at 09:29

## 2023-10-03 RX ADMIN — NICOTINE POLACRILEX 2 MG: 2 GUM, CHEWING BUCCAL at 19:35

## 2023-10-03 NOTE — CASE MANAGEMENT
Jerome from [facility] advised that they are unable to help patient for not having photo ID. Writer to explore alternative rehab options. Writer called and spoke to Kev Kruse, spoke to [facility], who advised that they do not have male bed available at this time however requested a referral be sent in. And their team will work on securing funding for the inpatient rehab. Writer has faxed pt's clinicals to following Rehab / [id number]    Write to follow up.

## 2023-10-03 NOTE — NURSING NOTE
Pt pleasant upon approach. Denies SI, HI and hallucinations. Visible on the unit social with peers and attending groups. Denies any unmet needs. Compliant with medications and meals. Continuous safety checks maintained.

## 2023-10-03 NOTE — PLAN OF CARE
Problem: Alteration in Thoughts and Perception  Goal: Treatment Goal: Gain control of psychotic behaviors/thinking, reduce/eliminate presenting symptoms and demonstrate improved reality functioning upon discharge  Outcome: Progressing  Goal: Complete daily ADLs, including personal hygiene independently, as able  Description: Interventions:  - Observe, teach, and assist patient with ADLS  - Monitor and promote a balance of rest/activity, with adequate nutrition and elimination   Outcome: Progressing     Problem: Ineffective Coping  Goal: Participates in unit activities  Description: Interventions:  - Provide therapeutic environment   - Provide required programming   - Redirect inappropriate behaviors   Outcome: Progressing     Problem: PSYCHOSIS  Goal: Will report no hallucinations or delusions  Description: Interventions:  - Administer medication as  ordered  - Every waking shifts and PRN assess for the presence of hallucinations and or delusions  - Assist with reality testing to support increasing orientation  - Assess if patient's hallucinations or delusions are encouraging self-harm or harm to others and intervene as appropriate  Outcome: Progressing     Problem: INVOLUNTARY ADMIT  Goal: Will cooperate with staff recommendations and doctor's orders and will demonstrate appropriate behavior  Description: INTERVENTIONS:  - Treat underlying conditions and offer medication as ordered  - Educate regarding involuntary admission procedures and rules  - Utilize positive consistent limit setting strategies to support patient and staff safety  Outcome: Progressing

## 2023-10-03 NOTE — PROGRESS NOTES
10/03/23 0815   Team Meeting   Meeting Type Daily Rounds   Team Members Present   Team Members Present Physician;Nurse;   Physician Team Member [id number] Team Member JORGE WILDE [facility] Management Team Member Maria Esther   Patient/Family Present   Patient Present No   Patient's Family Present No     303, pt visible and attends some groups. Denies symptoms. Compliant with meds. Awaiting response from [facility] for rehab placement. Continue to monitor. Discharge to be determined.

## 2023-10-03 NOTE — NURSING NOTE
Patient visible and social with peers on unit. Calm and cooperative. Denies SI/HI/AH/VH. Refused Nicotine patch and stated he rather have nicotine gum. PRN Nicotine gum given. Offers no complaints.

## 2023-10-03 NOTE — PROGRESS NOTES
BEHAVIORAL HEALTH SERVICE PROGRESS NOTE    Phong Kirby 43 y.o. male MRN: [id number]  Unit/Bed#: Stephen Mcmahan 382-02 Encounter: [id number]         ASSESSMENT/PLAN     Phong Kirby is a 43 y.o. male, who is admitted to [address] on 303 involuntary commitment for unstable mood and psychosis. Currently, pending outcome of HOST referral.    Principal Problem:    Bipolar I disorder, most recent episode manic, severe with psychotic features ([address])  Active Problems:    Medical clearance for psychiatric admission    Tobacco abuse    Pain, dental      RECOMMENDED TREATMENT     Continue current medications:  Melatonin 3 mg HS for sleep  Nicotine 7 mg patch daily for cravings  Olanzapine 25 mg HS for mood/psychosis (or Olanzapine 10 mg IM HS for medication over objection)  All current active medications have been reviewed  Encourage group therapy, milieu therapy and occupational therapy  Behavioral Health checks every 15 minutes    Risks / Benefits of Treatment: Risks, benefits, and possible side effects of medications explained to patient and patient verbalizes understanding and agreement for treatment. Counseling / Coordination of Care: Patient's progress discussed with staff in treatment team meeting. Medications, treatment progress and treatment plan reviewed with patient. Legal status: 303 involuntary commitment  Disposition: referrals as indicated         SUBJECTIVE     Over the last 24 hours:  Per nursing note: " Pt visible on unit, quiet and to self. Denies symptoms and concerns on assessment. Utilizing nicotine gum. Retired to bed early after taking medications. Safety checks continue. "  PRN medications: nicotine 2 mg gum (10/2 0818 2049)  Behavior over the last 24 hours: unchanged. The patient was seen and evaluated today for continuity of care. On interview, the patient is sitting comfortably in chair. Patient reports "perfect" mood. Patient endorses "better" energy levels. Patient reports "well. Tilmon Fluke  7-8 hours" sleep. Per patient, appetite is "great". Patient mentions that the HOST referral is pending and that he was informed of possible discharge tomorrow if all goes well. He reports looking forward to discharge and planning next steps. Currently, patient denies auditory hallucinations and denies visual hallucinations. The patient denies current passive or active suicidal ideation, intent, or plan. Patient denies current passive or active homicidal ideation, intent, or plan. Patient reports  tolerating medications well and denies adverse effects at this time. Team discussed treatment plan, to which patient is amenable. Patient does not endorse additional questions or concerns at this time. Attends group therapy. Socializes more with peers. Social in milieu.     Progress Toward Goals: progressing, improving, attends groups, less anxious    Review of Systems:  Sleep: normal  Appetite: normal  Medication side effects: No   ROS: reports no complaints, denies any headache, shortness of breath, dizziness, nausea or tiredness, all other systems are negative      OBJECTIVE     Vital signs in last 24 hours:   Temp:  [97.8 °F (36.6 °C)-98.1 °F (36.7 °C)] 97.8 °F (36.6 °C)  HR:  [71-79] 71  Resp:  [16] 16  BP: (117-122)/(66-68) 122/66    Mental Status Exam:  Appearance: alert, appears stated age, casually dressed, appropriate grooming and hygiene, dark  hair combed back and smiling  Behavior: cooperative, less anxious, still pleasant  Motor: no abnormal movements  Gait/Station: normal gait/station, normal balance  Speech: normal rate, normal volume, normal pitch, spontaneous, fluent  Mood: "perfect"  Affect: brighter, more reactive  Thought process: logical, goal directed, normal rate of thoughts  Associations: intact associations  Thought content: no overt delusions  Perceptual disturbances: no auditory hallucinations, no visual hallucinations, does not appear responding to internal stimuli  Risk potential:   Suicidal ideation - None  Homicidal ideation - None  Potential for aggression - Not at present  Cognition: memory grossly intact and appears to be of average intelligence  Sensorium: oriented to person, place and time/date  Consciousness: alert and awake  Attention/Concentration: attention span and concentration are age appropriate  Insight: Limited  Judgment: Improving     Suicide/Homicide Risk Assessment:  Risk of Harm to Self:   Nursing Suicide Risk Assessment Last 24 hours: C-SSRS Risk (Since Last Contact)  Calculated C-SSRS Risk Score (Since Last Contact): No Risk Indicated  Current Specific Risk Factors include: diagnosis of mood disorder, mental illness diagnosis, substance use  Protective Factors: no current suicidal plan or intent, ability to communicate with staff on the unit, able to contract for safety on the unit, taking medications as ordered on the unit, ability to make plans for the future, improved mood, responds to redirection, compliant with mental health treatment  Based on today's assessment, Bruce Hobbs presents the following risk of harm to self: low  Risk of Harm to Others:  Nursing Homicide Risk Assessment: Violence Risk to Others: Denies within past 6 months  Current Specific Risk Factors include: diagnosis of mood disorder, multiple stressors, social difficulties  Protective Factors: no current homicidal ideation, able to communicate with staff on the unit, improved impulse control, no current psychotic symptoms, compliant with medications on the unit as ordered, compliant with unit milieu, follows staff redirection  Based on today's assessment, Bruce Hobbs presents the following risk of harm to others: low  The following interventions are recommended: behavioral checks every 7 minutes, continued hospitalization on locked unit          Emotional and Mental Well-being, Sleep, Connectedness Assessment and Intervention:    Sleep/stress assessment performed      Tobacco and Toxic Substance Assessment and Intervention: Alcohol and drug use screening performed      Therapeutic Lifestyle Change Visit:     One-on-one comprehensive counseling, coaching, and health behavior change visit completed           ACTIVE MEDICATIONS     Current Medications:  Current Facility-Administered Medications   Medication Dose Route Frequency Provider Last Rate   • acetaminophen  650 mg Oral Q6H PRN Hedda Schaumann, DO     • acetaminophen  650 mg Oral Q4H PRN Hedda Schaumann, DO     • acetaminophen  975 mg Oral Q6H PRN Hedda Schaumann, DO     • aluminum-magnesium hydroxide-simethicone  30 mL Oral Q4H PRN Hedda Schaumann, DO     • benzocaine   Mucosal 4x Daily PRN Aristides Broussard PA-C     • benztropine  1 mg Intramuscular BID PRN Hedda Schaumann, DO     • benztropine  1 mg Oral BID PRN Hedda Schaumann, DO     • hydrOXYzine HCL  50 mg Oral Q6H PRN Max 4/day Hedda Schaumann, DO      Or   • diphenhydrAMINE  50 mg Intramuscular Q6H PRN Hedda Schaumann, DO     • glycerin-hypromellose-  1 drop Both Eyes Q3H PRN Hedda Schaumann, DO     • hydrOXYzine HCL  100 mg Oral Q6H PRN Max 4/day Hedda Schaumann, DO      Or   • LORazepam  2 mg Intramuscular Q6H PRN Hedda Schaumann, DO     • hydrOXYzine HCL  25 mg Oral Q6H PRN Max 4/day Jen Villegas, DO     • melatonin  3 mg Oral HS Mary Anne Mayo MD     • nicotine  7 mg Transdermal Daily Aristides Broussard PA-C     • nicotine polacrilex  2 mg Oral Q2H PRN MICHELLE Kitchen     • OLANZapine  25 mg Oral HS Yesenia Pedraza MD      Or   • OLANZapine  10 mg Intramuscular HS Yesenia Pedraza MD     • OLANZapine  10 mg Oral Q3H PRN Max 3/day Hedda Schaumann, DO      Or   • OLANZapine  10 mg Intramuscular Q3H PRN Max 3/day Hedda Schaumann, DO     • OLANZapine  5 mg Oral Q3H PRN Max 6/day Hedda Schaumann, DO      Or   • OLANZapine  5 mg Intramuscular Q3H PRN Max 6/day Hedda Schaumann, DO     • OLANZapine  2.5 mg Oral Q3H PRN Max 8/day Jen Villegas, DO     • polyethylene glycol  17 g Oral Daily PRN Hedda Schaumann, DO     • propranolol 10 mg Oral Q8H PRN Leontine Locker, DO     • senna-docusate sodium  1 tablet Oral Daily PRN Leontine Locker, DO         Behavioral Health Medications: I have personally reviewed all current active medications. Changes as in plan section above. ADDITIONAL DATA     EKG Results: reviewed  Results for orders placed or performed during the hospital encounter of 09/14/23 (from the past 1000 hour(s))   ECG 12 lead    Collection Time: 09/19/23 12:13 PM   Result Value    Ventricular Rate 58    Atrial Rate 58    OH Interval 154    QRSD Interval 106    QT Interval 406    QTC Interval 398    P Axis 60    QRS Axis 77    T Wave Axis 64    Narrative    Sinus bradycardia  Otherwise normal ECG  When compared with ECG of 19-SEP-2023 12:12, (unconfirmed)  No significant change was found  Confirmed by Desmond Hathaway ([id number]) on 9/19/2023 5:08:22 PM     *Note: Due to a large number of results and/or encounters for the requested time period, some results have not been displayed. A complete set of results can be found in Results Review. Radiology Results: Reviewed, no new imaging  No results found. No Chest XR results available for this patient. Laboratory Results: I have personally reviewed all pertinent laboratory/tests results  Results from the past 24 hours: No results found for this or any previous visit (from the past 24 hour(s)).       This note was not shared with the patient due to reasonable likelihood of causing patient harm        Haydee Navas MD 10/03/23  Department of Psychiatry and [facility]

## 2023-10-03 NOTE — PLAN OF CARE
Writer called [phone] and spoke to Steven Ennis at [facility] HOST program. HOST staff advised that referral has been and awaiting response from rehab facility. [facility] staff to get back to the writer with placement updates. Writer to follow up. no

## 2023-10-03 NOTE — NURSING NOTE
Pt visible on unit, quiet and to self. Denies symptoms and concerns on assessment. Utilizing nicotine gum. Retired to bed early after taking medications. Safety checks continue.

## 2023-10-04 PROCEDURE — 99232 SBSQ HOSP IP/OBS MODERATE 35: CPT | Performed by: PSYCHIATRY & NEUROLOGY

## 2023-10-04 RX ADMIN — NICOTINE POLACRILEX 2 MG: 2 GUM, CHEWING BUCCAL at 14:03

## 2023-10-04 RX ADMIN — MELATONIN TAB 3 MG 3 MG: 3 TAB at 21:17

## 2023-10-04 RX ADMIN — HYDROXYZINE HYDROCHLORIDE 50 MG: 50 TABLET, FILM COATED ORAL at 23:57

## 2023-10-04 RX ADMIN — NICOTINE POLACRILEX 2 MG: 2 GUM, CHEWING BUCCAL at 21:18

## 2023-10-04 RX ADMIN — NICOTINE POLACRILEX 2 MG: 2 GUM, CHEWING BUCCAL at 09:21

## 2023-10-04 RX ADMIN — NICOTINE POLACRILEX 2 MG: 2 GUM, CHEWING BUCCAL at 18:02

## 2023-10-04 RX ADMIN — NICOTINE POLACRILEX 2 MG: 2 GUM, CHEWING BUCCAL at 23:57

## 2023-10-04 RX ADMIN — OLANZAPINE 25 MG: 10 TABLET, ORALLY DISINTEGRATING ORAL at 21:17
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
## 2023-10-04 NOTE — PLAN OF CARE
Writer is coordinating with MUKUL MARTINEZ, [employer], [address] [place] [address]  and [employer] [id number] [address] funding for the inpatient rehab. Continue to follow up.      Problem: DISCHARGE PLANNING  Goal: Discharge to home or other facility with appropriate resources  Description: INTERVENTIONS:  - Identify barriers to discharge w/patient and caregiver  - Arrange for needed discharge resources and transportation as appropriate  - Identify discharge learning needs (meds, wound care, etc.)  - Arrange for interpretive services to assist at discharge as needed  - Refer to Case Management Department for coordinating discharge planning if the patient needs post-hospital services based on physician/advanced practitioner order or complex needs related to functional status, cognitive ability, or social support system  Outcome: Progressing

## 2023-10-04 NOTE — PROGRESS NOTES
10/04/23 0815   Team Meeting   Meeting Type Daily Rounds   Team Members Present   Team Members Present Physician;Nurse;   Physician Team Member [id number] Team Member JORGE WILDE [facility] Team Member Maria Esther   Patient/Family Present   Patient Present No   Patient's Family Present No      Pt visible, social and compliant with meds. Continue meds. D/c pending rehab placement. Case management exploring rehab placement. Consider 201 based on pt's progress. Continue to monitor.

## 2023-10-04 NOTE — NURSING NOTE
Patient pleasant and calm. Visible on unit socializing with peers. Med and meal compliant. Offers no complaints. Continuous safety checks maintained.

## 2023-10-04 NOTE — PROGRESS NOTES
BEHAVIORAL HEALTH SERVICE PROGRESS NOTE    Kamlesh Barton 43 y.o. male MRN: [id number]  Unit/Bed#: Aydee Brink 382-02 Encounter: [id number]         ASSESSMENT/PLAN     Kamlesh Barton is a 43 y.o. male, who is admitted to [address] on 303 involuntary commitment for unstable mood and psychosis. Currently, pending outcome of HOST referral. Patient signed [phone] today, which has been filed. Principal Problem:    Bipolar I disorder, most recent episode manic, severe with psychotic features ([address])  Active Problems:    Medical clearance for psychiatric admission    Tobacco abuse    Pain, dental      RECOMMENDED TREATMENT     Continue current medications:  Melatonin 3 mg HS for sleep  Nicotine 7 mg patch daily for nicotine cravings  Olanzapine 25 mg HS for mood/psychosis (OR olanzapine 10 mg IM for medication over objection)  All current active medications have been reviewed  Encourage group therapy, milieu therapy and occupational therapy  Behavioral Health checks every 15 minutes    Risks / Benefits of Treatment: Risks, benefits, and possible side effects of medications explained to patient and patient verbalizes understanding and agreement for treatment. Counseling / Coordination of Care: Patient's progress discussed with staff in treatment team meeting. Medications, treatment progress and treatment plan reviewed with patient. Legal status: 201 voluntary commitment  Disposition: referrals as indicated         SUBJECTIVE      Over the last 24 hours:  Per nursing note: " Pt calm and cooperative, Pt pleasant upon approach, Pt visible on the unit seen on the phone and talking with peers, Pt denied all pain anxiety and depression, Pt denies HI and SI , Pt denied AH and VH, Pt took all scheduled HS meds, Q7 min safety checks maintained "  PRN medications: Nicotine 2 mg (10/4 0921)  Behavior over the last 24 hours: improved. The patient was seen and evaluated today for continuity of care.  On interview, the patient is sitting comfortably in chair. Patient reports "great" mood. Patient endorses "great" energy levels. Patient reports "good" sleep. Per patient, appetite is "great". Patient reports that he was informed that he is currently pending placement. Currently, patient denies auditory hallucinations and denies visual hallucinations. The patient denies current passive or active suicidal ideation, intent, or plan. Patient denies current passive or active homicidal ideation, intent, or plan. Patient reports tolerating medications well and denies adverse effects at this time. Team discussed treatment plan, to which patient is amenable. He is offered a 201 commitment and signs after expressing understanding of what that entails, following which 201 was filed. Patient does not endorse additional questions or concerns at this time. Compliant with medications. Participates appropriately in milieu. Attends groups. Socializes more with peers.     Progress Toward Goals: improving gradually, less anxious, less depressed    Review of Systems:  Sleep: normal  Appetite: normal  Medication side effects: No   ROS: reports no complaints, denies any headache, shortness of breath, chest pain or abdominal pain, all other systems are negative      OBJECTIVE     Vital signs in last 24 hours:   Temp:  [97.1 °F (36.2 °C)-97.5 °F (36.4 °C)] 97.5 °F (36.4 °C)  HR:  [66-74] 66  Resp:  [16] 16  BP: (100-110)/(58-69) 110/69    Mental Status Exam:  Appearance: alert, appears stated age, casually dressed, appropriate grooming and hygiene, black hair and smiling  Behavior: cooperative, less anxious  Motor: no abnormal movements  Gait/Station: normal gait/station, normal balance  Speech: normal rate, normal volume, normal pitch, spontaneous  Mood: "great"  Affect: normal range and intensity, appropriate  Thought process: Organized, logical, goal-directed  Associations: intact associations  Thought content: no overt delusions  Perceptual disturbances: no auditory hallucinations, no visual hallucinations, does not appear responding to internal stimuli  Risk potential:   Suicidal ideation - None  Homicidal ideation - None  Potential for aggression - Not at present  Cognition: memory grossly intact and appears to be of average intelligence  Sensorium: oriented to person, place and time/date  Consciousness: alert and awake  Attention/Concentration: attention span and concentration are age appropriate  Insight: Improving  Judgment: Improving     Suicide/Homicide Risk Assessment:  Risk of Harm to Self:   Nursing Suicide Risk Assessment Last 24 hours: C-SSRS Risk (Since Last Contact)  Calculated C-SSRS Risk Score (Since Last Contact): No Risk Indicated  Current Specific Risk Factors include: diagnosis of mood disorder, mental illness diagnosis, lack of support  Protective Factors: Protective Factors:  The patient has desire to live, The patient does not want to die, The patient has no thoughts of suicide, no current suicidal ideation, able to contract for safety on the unit, taking medications as ordered on the unit, ability to manage anger well, ability to make plans for the future, improved mood, improved depressive symptoms, improved anxiety symptoms  Based on today's assessment, Ryan Carvajal presents the following risk of harm to self: low  Risk of Harm to Others:  Nursing Homicide Risk Assessment: Violence Risk to Others: Denies within past 6 months  Current Specific Risk Factors include: multiple stressors, social difficulties  Protective Factors: no current homicidal ideation, improved impulse control, no current psychotic symptoms, compliant with medications on the unit as ordered, compliant with unit milieu  Based on today's assessment, Ryan Carvajal presents the following risk of harm to others: low  The following interventions are recommended: behavioral checks every 7 minutes, continued hospitalization on locked unit      Nutrition Assessment and Intervention:     Reviewed food recall journal      Emotional and Mental Well-being, Sleep, Connectedness Assessment and Intervention:    Sleep/stress assessment performed      Tobacco and Toxic Substance Assessment and Intervention:     Alcohol and drug use screening performed      Therapeutic Lifestyle Change Visit:     One-on-one comprehensive counseling, coaching, and health behavior change visit completed           ACTIVE MEDICATIONS     Current Medications:  Current Facility-Administered Medications   Medication Dose Route Frequency Provider Last Rate   • acetaminophen  650 mg Oral Q6H PRN Byron Wilian, DO     • acetaminophen  650 mg Oral Q4H PRN Byron Wilian, DO     • acetaminophen  975 mg Oral Q6H PRN Byron Wilian, DO     • aluminum-magnesium hydroxide-simethicone  30 mL Oral Q4H PRN Byron Wilian, DO     • benzocaine   Mucosal 4x Daily PRN Amee Vásquez PA-C     • benztropine  1 mg Intramuscular BID PRN Byron Wilian, DO     • benztropine  1 mg Oral BID PRN Byron Wilian, DO     • hydrOXYzine HCL  50 mg Oral Q6H PRN Max 4/day Byron Wilian, DO      Or   • diphenhydrAMINE  50 mg Intramuscular Q6H PRN Byron Wilian, DO     • glycerin-hypromellose-  1 drop Both Eyes Q3H PRN Byron Wilian, DO     • hydrOXYzine HCL  100 mg Oral Q6H PRN Max 4/day Byron Wilian, DO      Or   • LORazepam  2 mg Intramuscular Q6H PRN Byron Wilian, DO     • hydrOXYzine HCL  25 mg Oral Q6H PRN Max 4/day Jen Villegas, DO     • melatonin  3 mg Oral HS Jaimie Cordero MD     • nicotine  7 mg Transdermal Daily Amee Vásquez PA-C     • nicotine polacrilex  2 mg Oral Q2H PRN MICHELLE Kitchen     • OLANZapine  25 mg Oral HS Faina Mora MD      Or   • OLANZapine  10 mg Intramuscular HS Faina Mora MD     • OLANZapine  10 mg Oral Q3H PRN Max 3/day Byron Wilian, DO      Or   • OLANZapine  10 mg Intramuscular Q3H PRN Max 3/day Byron Wilian, DO     • OLANZapine  5 mg Oral Q3H PRN Max 6/day Byron Wilian, DO      Or   • OLANZapine  5 mg Intramuscular Q3H PRN Max 6/day [place], DO     • OLANZapine  2.5 mg Oral Q3H PRN Max 8/day [place], DO     • polyethylene glycol  17 g Oral Daily PRN [place], DO     • propranolol  10 mg Oral Q8H PRN [place], DO     • senna-docusate sodium  1 tablet Oral Daily PRN [place], DO         Behavioral Health Medications: I have personally reviewed all current active medications. Changes as in plan section above. ADDITIONAL DATA     EKG Results: reviewed  Results for orders placed or performed during the hospital encounter of 09/14/23 (from the past 1000 hour(s))   ECG 12 lead    Collection Time: 09/19/23 12:13 PM   Result Value    Ventricular Rate 58    Atrial Rate 58    UT Interval 154    QRSD Interval 106    QT Interval 406    QTC Interval 398    P Axis 60    QRS Axis 77    T Wave Axis 64    Narrative    Sinus bradycardia  Otherwise normal ECG  When compared with ECG of 19-SEP-2023 12:12, (unconfirmed)  No significant change was found  Confirmed by Rizwan Molina ([id number]) on 9/19/2023 5:08:22 PM     *Note: Due to a large number of results and/or encounters for the requested time period, some results have not been displayed. A complete set of results can be found in Results Review. Radiology Results: Reviewed, no new imaging  No results found. No Chest XR results available for this patient. Laboratory Results: I have personally reviewed all pertinent laboratory/tests results  Results from the past 24 hours: No results found for this or any previous visit (from the past 24 hour(s)).       This note was not shared with the patient due to reasonable likelihood of causing patient harm        Suhail Dawkins MD 10/04/23  Department of Psychiatry and [facility]

## 2023-10-04 NOTE — NURSING NOTE
Pt calm and cooperative, Pt pleasant upon approach, Pt visible on the unit seen on the phone and talking with peers, Pt denied all pain anxiety and depression, Pt denies HI and SI , Pt denied AH and VH, Pt took all scheduled HS meds, Q7 min safety checks maintained

## 2023-10-05 PROCEDURE — 99232 SBSQ HOSP IP/OBS MODERATE 35: CPT | Performed by: PSYCHIATRY & NEUROLOGY

## 2023-10-05 RX ADMIN — NICOTINE POLACRILEX 2 MG: 2 GUM, CHEWING BUCCAL at 09:21

## 2023-10-05 RX ADMIN — MELATONIN TAB 3 MG 3 MG: 3 TAB at 21:52

## 2023-10-05 RX ADMIN — NICOTINE POLACRILEX 2 MG: 2 GUM, CHEWING BUCCAL at 13:42

## 2023-10-05 RX ADMIN — OLANZAPINE 25 MG: 10 TABLET, ORALLY DISINTEGRATING ORAL at 21:52

## 2023-10-05 RX ADMIN — NICOTINE POLACRILEX 2 MG: 2 GUM, CHEWING BUCCAL at 18:13

## 2023-10-05 NOTE — NURSING NOTE
PRN Atarax 50mg PO given at 2357 effective for restlessness/anxiety. Pt resting quietly in bed without further complaints.

## 2023-10-05 NOTE — CASE MANAGEMENT
Brandon Cantrell from [address] [place] [address] advised to have pt call in for a phone assessment. Writer to assist pt in calling at [phone]. Nazario Tineo advised that the process to secure funding may take today and discharge plan to be coordinated for Friday. Writer to follow up.

## 2023-10-05 NOTE — NURSING NOTE
Pt alert and oriented. Observed in dayroom and milieu. Medication and meal compliant. Pt denies SI/HI/AH/VH, pain, depression, and anxiety. Pt is able to make needs known. Pt is social with select peers. Pt is wearing his own clothes.

## 2023-10-05 NOTE — PLAN OF CARE
Problem: Alteration in Thoughts and Perception  Goal: Treatment Goal: Gain control of psychotic behaviors/thinking, reduce/eliminate presenting symptoms and demonstrate improved reality functioning upon discharge  Outcome: Progressing  Goal: Agree to be compliant with medication regime, as prescribed and report medication side effects  Description: Interventions:  - Offer appropriate PRN medication and supervise ingestion; conduct AIMS, as needed   Outcome: Progressing  Goal: Complete daily ADLs, including personal hygiene independently, as able  Description: Interventions:  - Observe, teach, and assist patient with ADLS  - Monitor and promote a balance of rest/activity, with adequate nutrition and elimination   Outcome: Progressing     Problem: Ineffective Coping  Goal: Participates in unit activities  Description: Interventions:  - Provide therapeutic environment   - Provide required programming   - Redirect inappropriate behaviors   Outcome: Progressing     Problem: PSYCHOSIS  Goal: Will report no hallucinations or delusions  Description: Interventions:  - Administer medication as  ordered  - Every waking shifts and PRN assess for the presence of hallucinations and or delusions  - Assist with reality testing to support increasing orientation  - Assess if patient's hallucinations or delusions are encouraging self-harm or harm to others and intervene as appropriate  Outcome: Progressing     Problem: INVOLUNTARY ADMIT  Goal: Will cooperate with staff recommendations and doctor's orders and will demonstrate appropriate behavior  Description: INTERVENTIONS:  - Treat underlying conditions and offer medication as ordered  - Educate regarding involuntary admission procedures and rules  - Utilize positive consistent limit setting strategies to support patient and staff safety  Outcome: Progressing

## 2023-10-05 NOTE — PROGRESS NOTES
BEHAVIORAL HEALTH SERVICE PROGRESS NOTE    Bernice Kim 43 y.o. male MRN: [id number]  Unit/Bed#: [facility]land 382-02 Encounter: [id number]         ASSESSMENT/PLAN     Bernice Kim is a 43 y.o. male, who is admitted to [facility] on 201 voluntary commitment for unstable mood and psychosis. Currently, pending outcome of HOST referral. No anticipated medication changes at this time. Principal Problem:    Bipolar I disorder, most recent episode manic, severe with psychotic features ([address])  Active Problems:    Medical clearance for psychiatric admission    Tobacco abuse    Pain, dental      RECOMMENDED TREATMENT     Continue current medications:  Melatonin 3 mg HS for sleep  Nicotine patch 7 mg daily for nicotine cravings  Olanzapine 25 mg HS for psychosis/mood (OR olanzapine 10 mg HS for medication over objection)  All current active medications have been reviewed  Encourage group therapy, milieu therapy and occupational therapy  Behavioral Health checks every 15 minutes    Risks / Benefits of Treatment: Risks, benefits, and possible side effects of medications explained to patient and patient verbalizes understanding and agreement for treatment. Counseling / Coordination of Care: Patient's progress discussed with staff in treatment team meeting. Medications, treatment progress and treatment plan reviewed with patient. Legal status: 201 voluntary commitment  Disposition: referrals as indicated         SUBJECTIVE     Over the last 24 hours:  Per nursing note: " Pt visible on unit and social with peers. Denies symptoms and needs. Awaits rehab placement. Utilizes nicotine gum. Compliant with unit routines, meds and care. Safety checks continue. "  PRN medications: Atarax 50 mg (10/4 8960), nicotine gum  Behavior over the last 24 hours: unchanged. The patient was seen and evaluated today for continuity of care. On interview, the patient is sitting comfortably. Patient reports "really good" mood.   Patient endorses "good" energy levels. Patient reports "less" sleep, for total 2 hours due to tossing and turning and possible discharge in the near future. Per patient, appetite is "good". 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 He also expresses wanting to work with rehab case management to get support with rental assistance. Currently, patient denies auditory hallucinations and denies visual hallucinations. The patient denies current passive or active suicidal ideation, intent, or plan. Patient denies current passive or active homicidal ideation, intent, or plan. Patient reports tolerating medications well and denies adverse effects at this time. Team discussed treatment plan, to which patient is amenable. Patient does not endorse additional questions or concerns at this time. Attends groups. More visible on the unit. Interacts appropriately with peers. Socializes more with peers.     Progress Toward Goals: progressing, working on coping skills    Review of Systems:  Sleep: difficulty falling asleep, frequent awakenings  Appetite: normal  Medication side effects: No   ROS: reports no complaints, denies any headache, shortness of breath, chest pain or back pain, all other systems are negative      OBJECTIVE     Vital signs in last 24 hours:   Temp:  [97.3 °F (36.3 °C)-98.8 °F (37.1 °C)] 97.3 °F (36.3 °C)  HR:  [69-88] 69  Resp:  [16] 16  BP: (113-123)/(73-80) 123/80    Mental Status Exam:  Appearance: alert, appears stated age, casually dressed, appropriate grooming and hygiene and black] hair  Behavior: pleasant, cooperative, calm  Motor: no abnormal movements  Gait/Station: normal gait/station, normal balance  Speech: normal rate, normal volume, normal pitch, spontaneous, fluent  Mood: "really good"  Affect: brighter  Thought process: Organized, logical, goal-directed  Associations: intact associations  Thought content: no overt delusions  Perceptual disturbances: no auditory hallucinations, no visual hallucinations, does not appear responding to internal stimuli  Risk potential:   Suicidal ideation - None  Homicidal ideation - None  Potential for aggression - Not at present  Cognition: memory grossly intact and appears to be of average intelligence  Sensorium: oriented to person, place and time/date  Consciousness: alert and awake  Attention/Concentration: attention span and concentration are age appropriate  Insight: Improving  Judgment: Improving     Suicide/Homicide Risk Assessment:  Risk of Harm to Self:   Nursing Suicide Risk Assessment Last 24 hours: C-SSRS Risk (Since Last Contact)  Calculated C-SSRS Risk Score (Since Last Contact): No Risk Indicated  Current Specific Risk Factors include: diagnosis of mood disorder, mental illness diagnosis, substance use  Protective Factors: no current suicidal ideation, ability to manage anger well, ability to make plans for the future, responds to redirection, compliant with medications, compliant with mental health treatment  Based on today's assessment, Bruce Hobbs presents the following risk of harm to self: low  Risk of Harm to Others:  Nursing Homicide Risk Assessment: Violence Risk to Others: Denies within past 6 months  Current Specific Risk Factors include: diagnosis of mood disorder, multiple stressors, social difficulties  Protective Factors: no current homicidal ideation, no current psychotic symptoms, compliant with medications on the unit as ordered, compliant with unit milieu, follows staff redirection, willing to continue psychiatric treatment, willing to remain free from substance use  Based on today's assessment, Bruce Hobbs presents the following risk of harm to others: low  The following interventions are recommended: behavioral checks every 7 minutes, continued hospitalization on locked unit      Nutrition Assessment and Intervention:     Reviewed food recall journal      Emotional and Mental Well-being, Sleep, Connectedness Assessment and Intervention:    Sleep/stress assessment performed      Therapeutic Lifestyle Change Visit:     One-on-one comprehensive counseling, coaching, and health behavior change visit completed           ACTIVE MEDICATIONS     Current Medications:  Current Facility-Administered Medications   Medication Dose Route Frequency Provider Last Rate   • acetaminophen  650 mg Oral Q6H PRN Foster Baer, DO     • acetaminophen  650 mg Oral Q4H PRN Foster Baer, DO     • acetaminophen  975 mg Oral Q6H PRN Foster Baer, DO     • aluminum-magnesium hydroxide-simethicone  30 mL Oral Q4H PRN Foster Baer, DO     • benzocaine   Mucosal 4x Daily PRN Payton Mcdermott PA-C     • benztropine  1 mg Intramuscular BID PRN Foster Baer, DO     • benztropine  1 mg Oral BID PRN Foster Baer, DO     • hydrOXYzine HCL  50 mg Oral Q6H PRN Max 4/day Quang Choz, DO      Or   • diphenhydrAMINE  50 mg Intramuscular Q6H PRN Foster Baer, DO     • glycerin-hypromellose-  1 drop Both Eyes Q3H PRN Foster Baer, DO     • hydrOXYzine HCL  100 mg Oral Q6H PRN Max 4/day Foster Baer, DO      Or   • LORazepam  2 mg Intramuscular Q6H PRN Foster Baer, DO     • hydrOXYzine HCL  25 mg Oral Q6H PRN Max 4/day Jen Villegas, DO     • melatonin  3 mg Oral HS Ashkan Hernandez MD     • nicotine  7 mg Transdermal Daily Payton Mcdermott PA-C     • nicotine polacrilex  2 mg Oral Q2H PRN MICHELLE Kitchen     • OLANZapine  25 mg Oral HS Yaneth Evans MD      Or   • OLANZapine  10 mg Intramuscular HS Yaneth Evans MD     • OLANZapine  10 mg Oral Q3H PRN Max 3/day Foster Baer, DO      Or   • OLANZapine  10 mg Intramuscular Q3H PRN Max 3/day Foster Baer, DO     • OLANZapine  5 mg Oral Q3H PRN Max 6/day Foster Baer, DO      Or   • OLANZapine  5 mg Intramuscular Q3H PRN Max 6/day Foster Baer, DO     • OLANZapine  2.5 mg Oral Q3H PRN Max 8/day Jenandrei Villegas, DO     • polyethylene glycol  17 g Oral Daily PRN Foster Baer, DO     • propranolol  10 mg Oral Q8H PRN Tylor Salinas DO     • senna-docusate sodium  1 tablet Oral Daily PRN Lynneeatete Dela Cruzer, DO         Behavioral Health Medications: I have personally reviewed all current active medications. Changes as in plan section above. ADDITIONAL DATA     EKG Results: reviewed  Results for orders placed or performed during the hospital encounter of 09/14/23 (from the past 1000 hour(s))   ECG 12 lead    Collection Time: 09/19/23 12:13 PM   Result Value    Ventricular Rate 58    Atrial Rate 58    WA Interval 154    QRSD Interval 106    QT Interval 406    QTC Interval 398    P Axis 60    QRS Axis 77    T Wave Axis 64    Narrative    Sinus bradycardia  Otherwise normal ECG  When compared with ECG of 19-SEP-2023 12:12, (unconfirmed)  No significant change was found  Confirmed by Gamaliel Arellano ([id number]) on 9/19/2023 5:08:22 PM     *Note: Due to a large number of results and/or encounters for the requested time period, some results have not been displayed. A complete set of results can be found in Results Review. Radiology Results: Reviewed, no new imaging  No results found. No Chest XR results available for this patient. Laboratory Results: I have personally reviewed all pertinent laboratory/tests results  Results from the past 24 hours: No results found for this or any previous visit (from the past 24 hour(s)).       This note was not shared with the patient due to reasonable likelihood of causing patient harm        Bradly Barclay MD 10/05/23  Department of Psychiatry and [facility]

## 2023-10-05 NOTE — CASE MANAGEMENT
Pt completed phone assessment with [facility] inpatient. Writer awaiting to hear from admissions. Continue to coordinate.

## 2023-10-05 NOTE — PROGRESS NOTES
10/05/23 0837   Team Meeting   Meeting Type Daily Rounds   Team Members Present   Team Members Present Physician;Nurse;   Physician Team Member [id number] Team Member [facility] Management Team Member Maria Esther   Patient/Family Present   Patient Present No   Patient's Family Present No     Pt denies symtpoms. Compliant with meds. Coordination with [facility]. D/c pending Rehab placement today or tomorrow. Continue to monitor.

## 2023-10-05 NOTE — NURSING NOTE
Pt visible on unit and social with peers. Denies symptoms and needs. Awaits rehab placement. Utilizes nicotine gum. Compliant with unit routines, meds and care. Safety checks continue.

## 2023-10-06 VITALS
WEIGHT: 181.4 LBS | TEMPERATURE: 97.9 F | DIASTOLIC BLOOD PRESSURE: 60 MMHG | HEIGHT: 71 IN | BODY MASS INDEX: 25.4 KG/M2 | RESPIRATION RATE: 16 BRPM | SYSTOLIC BLOOD PRESSURE: 138 MMHG | OXYGEN SATURATION: 96 % | HEART RATE: 98 BPM

## 2023-10-06 PROBLEM — Z00.8 MEDICAL CLEARANCE FOR PSYCHIATRIC ADMISSION: Status: RESOLVED | Noted: 2021-11-30 | Resolved: 2023-10-06

## 2023-10-06 PROCEDURE — 99238 HOSP IP/OBS DSCHRG MGMT 30/<: CPT | Performed by: PSYCHIATRY & NEUROLOGY

## 2023-10-06 RX ORDER — OLANZAPINE 20 MG/1
20 TABLET ORAL
Qty: 30 TABLET | Refills: 0 | Status: SHIPPED | OUTPATIENT
Start: 2023-10-06 | End: 2023-11-05

## 2023-10-06 RX ORDER — OLANZAPINE 5 MG/1
5 TABLET ORAL
Qty: 30 TABLET | Refills: 0 | Status: SHIPPED | OUTPATIENT
Start: 2023-10-06 | End: 2023-11-05

## 2023-10-06 RX ORDER — LANOLIN ALCOHOL/MO/W.PET/CERES
3 CREAM (GRAM) TOPICAL
Qty: 30 TABLET | Refills: 0 | Status: SHIPPED | OUTPATIENT
Start: 2023-10-06 | End: 2023-11-05

## 2023-10-06 RX ADMIN — HYDROXYZINE HYDROCHLORIDE 100 MG: 50 TABLET, FILM COATED ORAL at 02:26

## 2023-10-06 NOTE — CASE MANAGEMENT
Met with patient for discharge planning; reviewed learned coping skills during the hospital stay. Discussed effective use of learned Coping skills, support system such as [address], Calling 911 or going to nearest emergency for true emergency. Pt is being provided with paper scripts. Reviewed pt's acceptance at [facility] in [facility] and scheduled for a  today at 1030 Am. Pt is also advised to follow up with  Nenita Hanks during and after Rehab placement. Pt verbalized understanding of the plan. Pt thanked staff for helping him during this stay and for getting him into a Rehab. Pt denied SI, HI and AVH.  D/c with staff from [facility] today at 1030 AM.

## 2023-10-06 NOTE — CASE MANAGEMENT
Writer med with patient, Pt appears calm and praised for his compliance with groups and encouraged attending unit activities. Writer discussed about not wanting to be on a mood stabilizer. Pt replied "I am just fine with Zyprexa. I am healthy person I prefer less of meds". Writer discussed with patient about not having a insurance and a HOST referral will be made for D/A funding. Pt express interest in getting into a rehab after this admission. Also, discussed about patient's recent prior residences - pt claims he lived in [place] and would love to go there after completing his rehab. Pt is aware that his probation is up in November as long as he completes his inpatient Rehab treatment. Pt was encouraged continue attending group activities. Compliant with meds and meals. Pt denies SI, Hi and AVH. Pt less irritable and calm and cooperative. Continue to monitor.

## 2023-10-06 NOTE — NURSING NOTE
The patient came to the nurses station complaining of severe anxiety and was medicated with Atarax 100 mg. He complained of not being able to sleep, restlessness, and intrusive thoughts.

## 2023-10-06 NOTE — PROGRESS NOTES
10/06/23 0820   Team Meeting   Meeting Type Daily Rounds   Team Members Present   Team Members Present Physician;Nurse;   Physician Team Member [facility]   Nursing Team Member [facility]   Care Management Team Member Maria Esther   Patient/Family Present   Patient Present No   Patient's Family Present No     Pt visible and social. Calm and cooperative. Denies symptoms. D/c with staff from [facility] today at 56 Am.

## 2023-10-06 NOTE — PLAN OF CARE
Problem: Alteration in Thoughts and Perception  Goal: Treatment Goal: Gain control of psychotic behaviors/thinking, reduce/eliminate presenting symptoms and demonstrate improved reality functioning upon discharge  Outcome: Progressing  Goal: Verbalize thoughts and feelings  Description: Interventions:  - Promote a nonjudgmental and trusting relationship with the patient through active listening and therapeutic communication  - Assess patient's level of functioning, behavior and potential for risk  - Engage patient in 1 on 1 interactions  - Encourage patient to express fears, feelings, frustrations, and discuss symptoms    - [place] patient to reality, help patient recognize reality-based thinking   - Administer medications as ordered and assess for potential side effects  - Provide the patient education related to the signs and symptoms of the illness and desired effects of prescribed medications  Outcome: Progressing  Goal: Refrain from acting on delusional thinking/internal stimuli  Description: Interventions:  - Monitor patient closely, per order   - Utilize least restrictive measures   - Set reasonable limits, give positive feedback for acceptable   - Administer medications as ordered and monitor of potential side effects  Outcome: Progressing  Goal: Agree to be compliant with medication regime, as prescribed and report medication side effects  Description: Interventions:  - Offer appropriate PRN medication and supervise ingestion; conduct AIMS, as needed   Outcome: Progressing  Goal: Attend and participate in unit activities, including therapeutic, recreational, and educational groups  Description: Interventions:  -Encourage Visitation and family involvement in care  Outcome: Progressing  Goal: Recognize dysfunctional thoughts, communicate reality-based thoughts at the time of discharge  Description: Interventions:  - Provide medication and psycho-education to assist patient in compliance and developing insight into his/her illness   Outcome: Progressing  Goal: Complete daily ADLs, including personal hygiene independently, as able  Description: Interventions:  - Observe, teach, and assist patient with ADLS  - Monitor and promote a balance of rest/activity, with adequate nutrition and elimination   Outcome: Progressing     Problem: Ineffective Coping  Goal: Demonstrates healthy coping skills  Outcome: Progressing  Goal: Participates in unit activities  Description: Interventions:  - Provide therapeutic environment   - Provide required programming   - Redirect inappropriate behaviors   Outcome: Progressing     Problem: DISCHARGE PLANNING  Goal: Discharge to home or other facility with appropriate resources  Description: INTERVENTIONS:  - Identify barriers to discharge w/patient and caregiver  - Arrange for needed discharge resources and transportation as appropriate  - Identify discharge learning needs (meds, wound care, etc.)  - Arrange for interpretive services to assist at discharge as needed  - Refer to Case Management Department for coordinating discharge planning if the patient needs post-hospital services based on physician/advanced practitioner order or complex needs related to functional status, cognitive ability, or social support system  Outcome: Progressing     Problem: PSYCHOSIS  Goal: Will report no hallucinations or delusions  Description: Interventions:  - Administer medication as  ordered  - Every waking shifts and PRN assess for the presence of hallucinations and or delusions  - Assist with reality testing to support increasing orientation  - Assess if patient's hallucinations or delusions are encouraging self-harm or harm to others and intervene as appropriate  Outcome: Progressing     Problem: INVOLUNTARY ADMIT  Goal: Will cooperate with staff recommendations and doctor's orders and will demonstrate appropriate behavior  Description: INTERVENTIONS:  - Treat underlying conditions and offer medication as ordered  - Educate regarding involuntary admission procedures and rules  - Utilize positive consistent limit setting strategies to support patient and staff safety  Outcome: Progressing

## 2023-10-06 NOTE — NURSING NOTE
Pt escorted out of 3P to [facility]. Pt was given all personal belongings, discharge instructions and acknowledges understanding of all expectations and education after discharge as well as followup recommendations.

## 2023-10-06 NOTE — CASE MANAGEMENT
Sent (1)  To Contents Status Sensitive? --   [employer] - Discharge summary  Fax Summary of Care - for Follow Up Providers Only         AVS is also sent via e-mail to  Daniel Gasca at [facility] Adult probation.

## 2023-10-06 NOTE — PROGRESS NOTES
Pt does not have health insurance. Writer to assist pt in securing funding for inpatient rehab as appropriate. Pt is does not have established providers in community. Pt shares his interests are Music and Arts. Pt wants to get back into Rehab once psychiatrically stable. Pt denies Si, Hi and AVH. Pt remains disorganized -paranoid of being poisoned. Continue to monitor. 23 1434   Patient Intake   Living Arrangement Homeless  (Homeless, recently released from [place] [facility] and started Rehab at [facility].)   Can patient return home? No   Address to be Discharge to: [facility]   Patient's Telephone Number no phone   Access to Firearms No   Work History Unemployed   School Grade/Year Other (Comment)  (completed GED)   Admission Status   Status of Admission Involuntary   Date 302 will : 23   Hearing Date: 09/15/23   [address] [place] [address] Drive,  [address] he is from [address] [place] [address] prior to being incarcerated to [facility].    Patient History   Currently in Treatment No   Medical Problems refer to chart   Legal Issues Yes, possesion of illicit   Indicate type of legal issues present: Probation   Probation/ Name (if applicable) Patricia Oropeza at [facility] adult probation   Substance Abuse Yes (See [id number] History section for detail)   Crisis Info   Release of Information Signed Yes  (Sister Viviana Flores [phone]) Pt removed cortrak and is refusing to put it back in. RN spoke with pt's  Isacc and he would like to hold off on putting feeding tube back in if possible. Isacc and patient were educated on importance of nutrition and receiving medication through tube because pt is unable to swallow right now. As of now, feeding tube placement on hold and patient and  have been thoroughly educated.

## 2023-10-06 NOTE — PLAN OF CARE
Problem: Alteration in Thoughts and Perception  Goal: Treatment Goal: Gain control of psychotic behaviors/thinking, reduce/eliminate presenting symptoms and demonstrate improved reality functioning upon discharge  Outcome: Completed  Goal: Verbalize thoughts and feelings  Description: Interventions:  - Promote a nonjudgmental and trusting relationship with the patient through active listening and therapeutic communication  - Assess patient's level of functioning, behavior and potential for risk  - Engage patient in 1 on 1 interactions  - Encourage patient to express fears, feelings, frustrations, and discuss symptoms    - [place] patient to reality, help patient recognize reality-based thinking   - Administer medications as ordered and assess for potential side effects  - Provide the patient education related to the signs and symptoms of the illness and desired effects of prescribed medications  Outcome: Completed  Goal: Refrain from acting on delusional thinking/internal stimuli  Description: Interventions:  - Monitor patient closely, per order   - Utilize least restrictive measures   - Set reasonable limits, give positive feedback for acceptable   - Administer medications as ordered and monitor of potential side effects  Outcome: Completed  Goal: Agree to be compliant with medication regime, as prescribed and report medication side effects  Description: Interventions:  - Offer appropriate PRN medication and supervise ingestion; conduct AIMS, as needed   Outcome: Completed  Goal: Attend and participate in unit activities, including therapeutic, recreational, and educational groups  Description: Interventions:  -Encourage Visitation and family involvement in care  Outcome: Completed  Goal: Recognize dysfunctional thoughts, communicate reality-based thoughts at the time of discharge  Description: Interventions:  - Provide medication and psycho-education to assist patient in compliance and developing insight into his/her illness   Outcome: Completed  Goal: Complete daily ADLs, including personal hygiene independently, as able  Description: Interventions:  - Observe, teach, and assist patient with ADLS  - Monitor and promote a balance of rest/activity, with adequate nutrition and elimination   Outcome: Completed     Problem: Ineffective Coping  Goal: Demonstrates healthy coping skills  Outcome: Completed  Goal: Participates in unit activities  Description: Interventions:  - Provide therapeutic environment   - Provide required programming   - Redirect inappropriate behaviors   Outcome: Completed     Problem: DISCHARGE PLANNING  Goal: Discharge to home or other facility with appropriate resources  Description: INTERVENTIONS:  - Identify barriers to discharge w/patient and caregiver  - Arrange for needed discharge resources and transportation as appropriate  - Identify discharge learning needs (meds, wound care, etc.)  - Arrange for interpretive services to assist at discharge as needed  - Refer to Case Management Department for coordinating discharge planning if the patient needs post-hospital services based on physician/advanced practitioner order or complex needs related to functional status, cognitive ability, or social support system  Outcome: Completed     Problem: PSYCHOSIS  Goal: Will report no hallucinations or delusions  Description: Interventions:  - Administer medication as  ordered  - Every waking shifts and PRN assess for the presence of hallucinations and or delusions  - Assist with reality testing to support increasing orientation  - Assess if patient's hallucinations or delusions are encouraging self-harm or harm to others and intervene as appropriate  Outcome: Completed     Problem: INVOLUNTARY ADMIT  Goal: Will cooperate with staff recommendations and doctor's orders and will demonstrate appropriate behavior  Description: INTERVENTIONS:  - Treat underlying conditions and offer medication as ordered  - Educate regarding involuntary admission procedures and rules  - Utilize positive consistent limit setting strategies to support patient and staff safety  Outcome: Completed

## 2023-10-06 NOTE — NURSING NOTE
The patient has been out in the milieu all shift interacting with peers and staff. He denies SI, HI, AH and VH. Offers no complaints.

## 2023-10-06 NOTE — DISCHARGE INSTR - OTHER ORDERS
You will be discharged to [facility] located at [facility], [address] today at 1030 AM.     After completion of rehab you plan to return to [facility]. You are being provided with paper scripts. [facility] rehab to assist you with meds. After discharge, if you find your coping skills are not as effective and you continue to feel distressed please call [address] [facility] services are available 24 hours a day by calling [facility] ([facility], located within [facility]) AT [place] at [phone], and [address] : [phone]    Crisis Text Line : [phone]     If you feel you are a danger to yourself or others please contact 911 or go to nearest Emergency room to seek immediate help. Shawn Tran or Dagmar, our [place] and [place], will be calling you after your discharge, on the phone number that you provided. They will be available as an additional support, if needed. If you wish to speak with one of them, you may contact Shawn Tran at [phone] or Hector Elam at [phone].

## 2023-10-06 NOTE — BH TRANSITION RECORD
Contact Information: If you have any questions, concerns, pended studies, tests and/or procedures, or emergencies regarding your inpatient behavioral health visit. Please contact [facility] behavioral health unit 3P [phone] and ask to speak to a , nurse or physician. A contact is available 24 hours/ 7 days a week at this number. Summary of Procedures Performed During your Stay:  Below is a list of major procedures performed during your hospital stay and a summary of results:  - Cardiac Procedures/Studies: 9/19/23 - EKG - 58 bpm, sinus bradycardia, otherwise normal ECG . Pending Studies (From admission, onward)    None        Please follow up on the above pending studies with your PCP and/or referring provider.

## 2023-10-06 NOTE — DISCHARGE SUMMARY
Discharge Summary - Humberto Krishna 43 y.o. male MRN: [id number]  Unit/Bed#: [facility] 995-73 Encounter: [id number]     Admission Date: 9/14/2023         Discharge Date: 10/6/2023    Attending Psychiatrist: Carrie Mills MD    Reason for Admission/HPI:     Cari John is a 43 y.o. male, admitted to the inpatient behavioral health unit at [address], as a voluntarily 201 commitment, subsequent to unstable mood and psychotic symptoms. Please refer to the initial H&P below for full details. See below H&P from Carrie Mills MD, on 9/15/23 :    "Cari John is a 43 y.o. male with a history of Bipolar Disorder who was admitted to the inpatient psychiatric unit on a involuntary 302 commitment basis due to unstable mood and psychotic symptoms.     Symptoms prior to admission included manic symptoms, increased irritability, auditory hallucinations, visual hallucinations, delusional thinking with grandiose and persecutory delusions, disorganized behavior, disorganized thinking process, difficulty attending to activities of daily living, poor self-care, noncompliance with treatment and noncompliance with medications. Onset of symptoms was gradual starting several days ago with progressively worsening course since that time. Stressors preceding admission included chronic mental illness and noncompliance with treatment. Darvin Verma was brought in to ED by police due to psychotic behavior while at [facility] rehab. He was refusing his medications, was saying that he was "God the Father" and appeared to have conversations with other people who were not there. Per 302 petitioned by staff from [facility] he walked into the road yesterday and needed redirection multiple times to come off the road. He also hid a plastic butter knife in his notebook.  While in ED he was uncooperative with assessments and involuntary commitment was upheld.     On initial evaluation after admission to the inpatient psychiatric unit Pa Roberts did not want to cooperate with the interview and his psychiatric history was obtained mostly from chart records. He blocked his room door and would answer only a few questions while standing in the entrance to his room. He said that he was not going to talk until Monday and that he needed to rest. He made multiple paranoid statements about [facility] staff sending him to the hospital claiming that he did not need treatment, did not need any medications and was only going to take nicotine patch. He had pressured, tangential speech and disorganized thinking process. He seemed grandiose and preoccupied. His self-care was poor. He denied suicidal thoughts. "      Past Medical History:   Diagnosis Date   • Avulsion fracture    • Bipolar disorder ([address])      History reviewed. No pertinent surgical history. Medications: All current active medications have been reviewed. Medications prior to admission:    Prior to Admission Medications   Prescriptions Last Dose Informant Patient Reported? Taking? ARIPiprazole (ABILIFY) 10 mg tablet  Outside Facility (Specify) Yes No   Sig: Take 10 mg by mouth daily   Diclofenac Sodium (VOLTAREN) 1 %   No No   Sig: Apply 2 g topically 4 (four) times a day   Melatonin 10 MG TABS  Outside Facility (Specify) Yes No   Sig: Take 1 mg by mouth daily at bedtime as needed   divalproex sodium (DEPAKOTE) 250 mg DR tablet  Outside Facility (Specify) Yes No   Sig: Take 250 mg by mouth every 12 (twelve) hours   hydrOXYzine pamoate (VISTARIL) 25 mg capsule  Outside Facility (Specify) Yes No   Sig: Take 25 mg by mouth 3 (three) times a day as needed for anxiety   traZODone (DESYREL) 50 mg tablet  Outside Facility (Specify) Yes No   Sig: Take 50 mg by mouth daily at bedtime as needed for sleep      Facility-Administered Medications: None       Allergies:      Allergies   Allergen Reactions   • Erythromycin Rash   • Sulfa Antibiotics Rash       Objective     Vital signs in last 24 hours:    Temp:  [97.9 °F (36.6 °C)-98.1 °F (36.7 °C)] 97.9 °F (36.6 °C)  HR:  [80-98] 98  Resp:  [16] 16  BP: (120-138)/(60-80) 138/60    No intake or output data in the 24 hours ending 10/06/23 [facility]:     Yogesh Virgen was admitted to the inpatient psychiatric unit and started on Behavioral Health checks every 15 minutes. During the hospitalization he was attending individual therapy, group therapy, milieu therapy and occupational therapy. Psychiatric medications were adjusted over the hospital stay. To address irritability, manic symptoms, auditory hallucinations, visual hallucinations, disorganized behavior and disorganized thinking process, Yogesh Virgen was treated with antipsychotic medication Zyprexa. Medication doses were titrated to 25 mg HS during the hospital course. Prior to beginning of treatment medications risks and benefits and possible side effects including risk of parkinsonian symptoms, Tardive Dyskinesia and metabolic syndrome related to treatment with antipsychotic medications and risk of cardiovascular events in elderly related to treatment with antipsychotic medications were discussed with Yogesh Virgen. He verbalized understanding and agreement for treatment. Upon admission Yogesh Virgen was seen by medical service for medical clearance for inpatient treatment and medical follow up. Kaylahs symptoms slowly resolved over the hospital course. Initially after admission he was still feeling anxious, manic, paranoid and grandiose. With adjustment of medications and therapeutic milieu his symptoms slowly improved. At the end of treatment Yogesh Virgen was doing much better. His mood was significantly improved at the time of discharge. Yogesh Virgen denied suicidal ideation, intent or plan at the time of discharge and denied homicidal ideation, intent or plan at the time of discharge. There was no overt psychosis at the time of discharge.  Auditory hallucinations were resolved. Visual hallucinations were resolved. Javid Zafar was participating appropriately in milieu at the time of discharge. Behavior was appropriate on the unit at the time of discharge. Sleep and appetite were improved. Javid Zafar was tolerating medications and was not reporting any significant side effects at the time of discharge. Javid Zafar had no complaints during the hospitalization, and he was not reporting any other significant side effects from medications at the time of discharge. Since he was doing well at the end of the hospitalization, treatment team felt that Javid Zafar could be safely discharged to outpatient care. Placement was arranged at at drug and alcohol rehabilitation facility upon discharge from the hospital. Javid Zafar was agreeable for placement. He also felt stable and ready for discharge at the end of the hospital stay. Javid Zafar expressed desire and motivation for an inpatient drug and alcohol treatment and was transferred to [facility] at the end of the hospitalization.         Mental Status at Time of Discharge:     Appearance:  age appropriate, casually dressed, dressed appropriately, adequate grooming, looks stated age   Behavior:  pleasant, cooperative, calm   Speech:  normal rate, normal volume, normal pitch   Mood:  "good"   Affect:  normal range and intensity, appropriate   Thought Process:  logical, goal directed   Associations: intact associations   Thought Content:  no overt delusions   Perceptual Disturbances: no auditory hallucinations, no visual hallucinations, does not appear responding to internal stimuli   Risk Potential: Suicidal ideation - None  Homicidal ideation - None  Potential for aggression - Not at present   Sensorium:  oriented to person, place and time/date   Memory:  recent and remote memory grossly intact   Consciousness:  alert and awake   Attention/Concentration: attention span and concentration are age appropriate   Insight:  fair Judgment: fair   Gait/Station: normal gait/station   Motor Activity: no abnormal movements       Suicide/Homicide Risk Assessment:    Risk of Harm to Self:   The following ratings are based on assessment at the time of discharge, review of the hospital stay progress, assessment at the time of the interview, observation over the last several days and review of records  Demographic risk factors include: , never , male  Historical Risk Factors include: history of depression, history of anxiety, history of mood disorder, history of psychosis, substance use, history of legal problems  Current Specific Risk Factors include: recent inpatient psychiatric admission - being discharged today, diagnosis of mood disorder, mental illness diagnosis, lack of support, substance use  Protective Factors: no current suicidal ideation, no current depressive symptoms, stable mood, no current anxiety symptoms, no current psychotic symptoms, ability to adapt to change, ability to manage anger well, ability to make plans for the future, no current suicidal plan or intent, being discharged to a supportive environment (inpatient D&A rehab), compliant with medications, compliant with mental health treatment, effective coping skills  Weapons/Firearms: none. The following steps have been taken to ensure weapons are properly secured: not applicable  Based on today's assessment, Karina Gillespie presents the following risk of harm to self: low    Risk of Harm to Others: The following ratings are based on assessment at the time of discharge, review of the hospital stay progress, assessment at the time of the interview, observation over the last several days and review of records  Demographic Risk Factors include: male, unemployed, under age 36. Historical Risk Factors include: prior arrest, history of substance use.   Current Specific Risk Factors include: recent episode of mood instability, recent aggressive behavior, multiple stressors, social difficulties  Protective Factors: no current homicidal ideation, improved impulse control, stable mood, no current psychotic symptoms, compliant with medications, compliant with treatment, willing to continue psychiatric treatment, willing to remain free from substance use, being discharged to a supportive environment (inpatient Drug and Alcohol Rehabilitation facility), ability to adapt to change, able to manage anger well, effective coping skills  Weapons/Firearms: none. The following steps have been taken to ensure weapons are properly secured: not applicable  Based on today's assessment, Ladi Rothman presents the following risk of harm to others: low    The following interventions are recommended: transfer to [facility]    Admission Diagnosis:    Principal Problem:    Bipolar I disorder, most recent episode manic, severe with psychotic features ([address])  Active Problems:    Tobacco abuse    Pain, dental      Discharge Diagnosis:     Principal Problem:    Bipolar I disorder, most recent episode manic, severe with psychotic features ([address])  Active Problems:    Tobacco abuse    Pain, dental  Resolved Problems:    Medical clearance for psychiatric admission      Laboratory Results: I have personally reviewed all pertinent laboratory/tests results    Results from the past 24 hours: No results found for this or any previous visit (from the past 24 hour(s)). Discharge Medications:    See after visit summary for all reconciled discharge medications provided to patient and family. Discharge instructions/Information to patient and family:     See after visit summary for information provided to patient and family. Provisions for Follow-Up Care:    See after visit summary for information related to follow-up care and any pertinent home health orders.         Benito Santiago MD 10/06/23

## 2023-10-06 NOTE — CASE MANAGEMENT
Pt's sister Archana He was called twice at [phone]; call was not answered and voicemail was not set, writer could not leave a message. Writer to follow up as appropriate.

## 2023-10-06 NOTE — CASE MANAGEMENT
Jerome from [facility] rehab advised that pt has been accepted and their transportation will pick patient today at 1030 Am.  Writer sent updated med list. Meredith Eli advised rehab staff to call 3P unit nursing station phone number upon arrival.

## 2024-08-19 ENCOUNTER — TELEPHONE (OUTPATIENT)
Age: 39
End: 2024-08-19

## 2024-08-19 NOTE — TELEPHONE ENCOUNTER
Pt called in stating they were scheduled for an Appt 8/20 at 1:30 for Therapy, did not know who it was with. Claims someone called but pt. Has no idea who it was or what it was for. There is no appointment of documentation in the chart at this time.     Call back: [phone]

## 2024-08-19 NOTE — TELEPHONE ENCOUNTER
Patient called in stating they were returning a message they got to schedule an appointment either today or tomorrow.    Writer researched and could not find any information on this.    Writer transferred patient to intake department for further assistance.

## 2024-10-28 ENCOUNTER — HOSPITAL ENCOUNTER (EMERGENCY)
Dept: HOSPITAL 99 - EMR | Age: 39
Discharge: PSYCHIATRIC OR SUBSTANCE USE FACILITY | End: 2024-10-28
Payer: MEDICARE

## 2024-10-28 VITALS — DIASTOLIC BLOOD PRESSURE: 64 MMHG | SYSTOLIC BLOOD PRESSURE: 94 MMHG | RESPIRATION RATE: 26 BRPM

## 2024-10-28 DIAGNOSIS — F17.200: ICD-10-CM

## 2024-10-28 DIAGNOSIS — F25.0: Primary | ICD-10-CM

## 2024-10-28 LAB
ALBUMIN SERPL-MCNC: 5.3 G/DL (ref 3.5–5)
ALP SERPL-CCNC: 72 U/L (ref 38–126)
ALT SERPL-CCNC: 52 U/L (ref 0–50)
APAP SERPL-MCNC: < 10 UG/ML (ref 10–30)
AST SERPL-CCNC: 114 U/L (ref 17–59)
BUN SERPL-MCNC: 20 MG/DL (ref 9–20)
CALCIUM SERPL-MCNC: 10.4 MG/DL (ref 8.4–10.2)
CHLORIDE SERPL-SCNC: 105 MMOL/L (ref 98–107)
CO2 SERPL-SCNC: 19 MMOL/L (ref 22–30)
EGFR: > 60
ERYTHROCYTE [DISTWIDTH] IN BLOOD BY AUTOMATED COUNT: 12.8 % (ref 11.5–14.5)
GLUCOSE SERPL-MCNC: 96 MG/DL (ref 70–99)
HCT VFR BLD AUTO: 44.3 % (ref 39–52)
HGB BLD-MCNC: 15.6 G/DL (ref 13–18)
MCHC RBC AUTO-ENTMCNC: 35.2 G/DL (ref 33–37)
MCV RBC AUTO: 83.1 FL (ref 80–94)
NRBC BLD AUTO-RTO: 0 %
PLATELET # BLD AUTO: 440 10^3/UL (ref 130–400)
POTASSIUM SERPL-SCNC: 3.9 MMOL/L (ref 3.5–5.1)
PROT SERPL-MCNC: 8.1 G/DL (ref 6.3–8.2)
SALICYLATES SERPL-MCNC: < 1 MG/DL (ref 2–20)
SODIUM SERPL-SCNC: 143 MMOL/L (ref 135–145)

## 2024-10-28 PROCEDURE — 96372 THER/PROPH/DIAG INJ SC/IM: CPT

## 2024-10-28 PROCEDURE — 99284 EMERGENCY DEPT VISIT MOD MDM: CPT

## 2024-10-28 RX ADMIN — LORAZEPAM 2 MG: 2 INJECTION INTRAMUSCULAR; INTRAVENOUS at 13:15

## 2024-10-28 RX ADMIN — HALOPERIDOL LACTATE 5 MG: 5 INJECTION, SOLUTION INTRAMUSCULAR at 13:15

## 2024-10-28 NOTE — W.PN.UPDATE
"Update Note"~"Progress Note Update"~"-: "~"Pt seen for 302 exam, brought in by police after found reportedly RIS/screaming, making delusional statements.  No aggressive or self-harmful behavior is reported.  Pt initially came in willingly, then left and was brought back by police who filed "~"the 302.  Pt seen after eating lunch, was given Haldol and Ativan for general agitation.  Pt somewhat disorganized, but overall cooperative.  Pt denies any S/H ideation.  Pt on the floor states he is 'resting.'  Pt states he prefers to be homeless, "~"declines any services.  Pt asked if his lab results are okay.  "~"Imp/Rec:   Unspecified psychotic d/o.  302 is not upheld, pt does not meet behavior criteria for involuntary treatment; there is no reported/alleged threat to self or others"~"      Pt will be encouraged to seek outpatient treatment.  Reviewed with Crisis staff."

## 2025-03-10 NOTE — PROGRESS NOTES
09/22/23 0898   Team Meeting   Meeting Type Daily Rounds   Team Members Present   Team Members Present Physician;Nurse;   Physician Team Member [facility]   Nursing Team Member [facility] Management Team Member Maria Esther   Patient/Family Present   Patient Present No   Patient's Family Present No     303, Pt less disorganized, less paranoid and visible and social at times. Compliant with meds and meals. Continue med management -continue Zyprexa. Per  pt must complete Inpatient rehab treatment as condition of probation. Continue to monitor. Discharge to be determined. [Annual] : an annual visit.